# Patient Record
Sex: MALE | Race: WHITE | HISPANIC OR LATINO | Employment: UNEMPLOYED | URBAN - METROPOLITAN AREA
[De-identification: names, ages, dates, MRNs, and addresses within clinical notes are randomized per-mention and may not be internally consistent; named-entity substitution may affect disease eponyms.]

---

## 2017-01-12 ENCOUNTER — ALLSCRIPTS OFFICE VISIT (OUTPATIENT)
Dept: OTHER | Facility: OTHER | Age: 47
End: 2017-01-12

## 2017-01-26 ENCOUNTER — ALLSCRIPTS OFFICE VISIT (OUTPATIENT)
Dept: OTHER | Facility: OTHER | Age: 47
End: 2017-01-26

## 2017-05-04 ENCOUNTER — ALLSCRIPTS OFFICE VISIT (OUTPATIENT)
Dept: OTHER | Facility: OTHER | Age: 47
End: 2017-05-04

## 2017-05-04 DIAGNOSIS — E78.1 PURE HYPERGLYCERIDEMIA: ICD-10-CM

## 2017-05-04 DIAGNOSIS — R01.1 CARDIAC MURMUR: ICD-10-CM

## 2017-05-04 DIAGNOSIS — R53.83 OTHER FATIGUE: ICD-10-CM

## 2017-05-04 DIAGNOSIS — E55.9 VITAMIN D DEFICIENCY: ICD-10-CM

## 2017-05-04 DIAGNOSIS — R42 DIZZINESS AND GIDDINESS: ICD-10-CM

## 2018-01-12 VITALS
BODY MASS INDEX: 35.8 KG/M2 | HEIGHT: 68 IN | SYSTOLIC BLOOD PRESSURE: 128 MMHG | WEIGHT: 236.25 LBS | DIASTOLIC BLOOD PRESSURE: 80 MMHG | RESPIRATION RATE: 16 BRPM | HEART RATE: 75 BPM | TEMPERATURE: 97.6 F

## 2018-01-13 VITALS
HEART RATE: 88 BPM | TEMPERATURE: 97.8 F | BODY MASS INDEX: 36.26 KG/M2 | SYSTOLIC BLOOD PRESSURE: 150 MMHG | WEIGHT: 239.25 LBS | DIASTOLIC BLOOD PRESSURE: 90 MMHG | HEIGHT: 68 IN | RESPIRATION RATE: 16 BRPM

## 2018-01-14 VITALS
HEART RATE: 76 BPM | BODY MASS INDEX: 36.58 KG/M2 | SYSTOLIC BLOOD PRESSURE: 138 MMHG | DIASTOLIC BLOOD PRESSURE: 80 MMHG | TEMPERATURE: 98.5 F | WEIGHT: 241.38 LBS | RESPIRATION RATE: 16 BRPM | HEIGHT: 68 IN

## 2018-07-30 ENCOUNTER — TELEPHONE (OUTPATIENT)
Dept: FAMILY MEDICINE CLINIC | Facility: CLINIC | Age: 48
End: 2018-07-30

## 2018-07-30 NOTE — TELEPHONE ENCOUNTER
Patient was last seen 5/2017 with Dr Ashley Stewart  Please schedule Health Maintenance visit  Thank you

## 2018-10-29 ENCOUNTER — OFFICE VISIT (OUTPATIENT)
Dept: FAMILY MEDICINE CLINIC | Facility: CLINIC | Age: 48
End: 2018-10-29

## 2018-10-29 VITALS
TEMPERATURE: 97.6 F | WEIGHT: 247 LBS | RESPIRATION RATE: 16 BRPM | HEART RATE: 80 BPM | DIASTOLIC BLOOD PRESSURE: 80 MMHG | HEIGHT: 68 IN | BODY MASS INDEX: 37.44 KG/M2 | SYSTOLIC BLOOD PRESSURE: 138 MMHG

## 2018-10-29 DIAGNOSIS — J06.9 VIRAL URI WITH COUGH: Primary | ICD-10-CM

## 2018-10-29 PROCEDURE — 99213 OFFICE O/P EST LOW 20 MIN: CPT | Performed by: FAMILY MEDICINE

## 2018-10-29 RX ORDER — FLUTICASONE PROPIONATE 50 MCG
1 SPRAY, SUSPENSION (ML) NASAL DAILY
Qty: 16 G | Refills: 0 | Status: SHIPPED | OUTPATIENT
Start: 2018-10-29 | End: 2020-04-10

## 2018-10-29 NOTE — LETTER
October 29, 2018     Patient: [address]   YOB: 1970   Date of Visit: 10/29/2018       To Whom it May Concern:    Dixie aSlinas is under my professional care  He was seen in my office on 10/29/2018  He may return to work on wednesday 10/31/18   If you have any questions or concerns, please don't hesitate to call           Sincerely,          Fabio Camacho DO        CC: No Recipients

## 2018-10-29 NOTE — ASSESSMENT & PLAN NOTE
- supportive care with hydration, rest and flonase   - follow up if condition worsens or fails to improve

## 2018-10-29 NOTE — PROGRESS NOTES
Assessment/Plan     Viral URI with cough  - supportive care with hydration, rest and flonase   - follow up if condition worsens or fails to improve      Diagnoses and all orders for this visit:    Viral URI with cough  -     fluticasone (FLONASE) 50 mcg/act nasal spray; 1 spray into each nostril daily         Subjective     Chief Complaint: Cough     HPI:   This is a 47 yo M who presents with a four day history of cough, fever, congestion  He reports associated body aches and chills  He reports Tmax of 100  The following portions of the patient's history were reviewed and updated as appropriate: allergies, current medications, past family history, past medical history, past social history, past surgical history and problem list     Review of Systems  Review of Systems   Constitutional: Positive for chills, fatigue and fever  HENT: Positive for congestion and sore throat  Respiratory: Positive for cough  Negative for shortness of breath  Cardiovascular: Negative for chest pain  Gastrointestinal: Negative for abdominal pain, constipation, diarrhea, nausea and vomiting  Genitourinary: Negative for difficulty urinating  Musculoskeletal: Negative for back pain  Skin: Negative for rash  Neurological: Negative for dizziness, light-headedness and headaches  Objective   Vitals:    10/29/18 1701   BP: 138/80   Pulse: 80   Resp: 16   Temp: 97 6 °F (36 4 °C)       Physical Exam   Constitutional: He appears well-developed and well-nourished  HENT:   Mouth/Throat: Oropharynx is clear and moist    Nasal mucosa erythematous    Neck: Neck supple  Cardiovascular: Normal rate and regular rhythm  Murmur heard  2/6 systolic murmur heard best over left sternal border    Abdominal: Soft  Bowel sounds are normal  There is no tenderness  Lymphadenopathy:     He has no cervical adenopathy  Lab Results: I have reviewed all the lab results

## 2020-02-25 ENCOUNTER — TELEPHONE (OUTPATIENT)
Dept: FAMILY MEDICINE CLINIC | Facility: CLINIC | Age: 50
End: 2020-02-25

## 2020-04-03 ENCOUNTER — TELEMEDICINE (OUTPATIENT)
Dept: FAMILY MEDICINE CLINIC | Facility: CLINIC | Age: 50
End: 2020-04-03

## 2020-04-03 VITALS — WEIGHT: 235 LBS | BODY MASS INDEX: 35.61 KG/M2 | TEMPERATURE: 100 F | HEIGHT: 68 IN

## 2020-04-03 DIAGNOSIS — R50.9 FEVER, UNSPECIFIED FEVER CAUSE: Primary | ICD-10-CM

## 2020-04-03 PROCEDURE — 99442 PR PHYS/QHP TELEPHONE EVALUATION 11-20 MIN: CPT | Performed by: FAMILY MEDICINE

## 2020-04-06 ENCOUNTER — TELEMEDICINE (OUTPATIENT)
Dept: FAMILY MEDICINE CLINIC | Facility: CLINIC | Age: 50
End: 2020-04-06

## 2020-04-06 VITALS — TEMPERATURE: 102.9 F | HEIGHT: 68 IN | BODY MASS INDEX: 34.86 KG/M2 | WEIGHT: 230 LBS

## 2020-04-06 DIAGNOSIS — Z20.828 EXPOSURE TO SARS-ASSOCIATED CORONAVIRUS: ICD-10-CM

## 2020-04-06 DIAGNOSIS — Z20.828 EXPOSURE TO SARS-ASSOCIATED CORONAVIRUS: Primary | ICD-10-CM

## 2020-04-06 PROCEDURE — G0071 COMM SVCS BY RHC/FQHC 5 MIN: HCPCS | Performed by: FAMILY MEDICINE

## 2020-04-06 PROCEDURE — 87635 SARS-COV-2 COVID-19 AMP PRB: CPT

## 2020-04-07 LAB — SARS-COV-2 RNA SPEC QL NAA+PROBE: DETECTED

## 2020-04-08 ENCOUNTER — TELEMEDICINE (OUTPATIENT)
Dept: FAMILY MEDICINE CLINIC | Facility: CLINIC | Age: 50
End: 2020-04-08

## 2020-04-08 ENCOUNTER — TELEPHONE (OUTPATIENT)
Dept: FAMILY MEDICINE CLINIC | Facility: CLINIC | Age: 50
End: 2020-04-08

## 2020-04-08 VITALS — WEIGHT: 228 LBS | TEMPERATURE: 101.5 F | BODY MASS INDEX: 34.67 KG/M2

## 2020-04-08 DIAGNOSIS — Z20.828 EXPOSURE TO SARS-ASSOCIATED CORONAVIRUS: Primary | ICD-10-CM

## 2020-04-08 PROCEDURE — G0071 COMM SVCS BY RHC/FQHC 5 MIN: HCPCS | Performed by: FAMILY MEDICINE

## 2020-04-10 ENCOUNTER — TELEMEDICINE (OUTPATIENT)
Dept: FAMILY MEDICINE CLINIC | Facility: CLINIC | Age: 50
End: 2020-04-10

## 2020-04-10 ENCOUNTER — OFFICE VISIT (OUTPATIENT)
Dept: FAMILY MEDICINE CLINIC | Facility: CLINIC | Age: 50
End: 2020-04-10
Payer: MEDICARE

## 2020-04-10 VITALS — TEMPERATURE: 102.8 F | BODY MASS INDEX: 34.67 KG/M2 | WEIGHT: 228 LBS

## 2020-04-10 VITALS — TEMPERATURE: 100.1 F | OXYGEN SATURATION: 92 % | HEART RATE: 98 BPM | RESPIRATION RATE: 18 BRPM

## 2020-04-10 DIAGNOSIS — U07.1 COVID-19 VIRUS DETECTED: Primary | ICD-10-CM

## 2020-04-10 DIAGNOSIS — R06.00 DYSPNEA ON EXERTION: Primary | ICD-10-CM

## 2020-04-10 DIAGNOSIS — R05.9 COUGH: ICD-10-CM

## 2020-04-10 PROCEDURE — 99214 OFFICE O/P EST MOD 30 MIN: CPT | Performed by: NURSE PRACTITIONER

## 2020-04-10 PROCEDURE — 1036F TOBACCO NON-USER: CPT | Performed by: NURSE PRACTITIONER

## 2020-04-10 PROCEDURE — 99213 OFFICE O/P EST LOW 20 MIN: CPT | Performed by: FAMILY MEDICINE

## 2020-04-10 RX ORDER — BROMPHENIRAMINE MALEATE, PSEUDOEPHEDRINE HYDROCHLORIDE, AND DEXTROMETHORPHAN HYDROBROMIDE 2; 30; 10 MG/5ML; MG/5ML; MG/5ML
5 SYRUP ORAL EVERY 6 HOURS PRN
Qty: 118 ML | Refills: 0 | Status: SHIPPED | OUTPATIENT
Start: 2020-04-10 | End: 2020-04-23 | Stop reason: HOSPADM

## 2020-04-11 ENCOUNTER — TELEPHONE (OUTPATIENT)
Dept: OTHER | Facility: HOSPITAL | Age: 50
End: 2020-04-11

## 2020-04-12 ENCOUNTER — TELEPHONE (OUTPATIENT)
Dept: OTHER | Facility: HOSPITAL | Age: 50
End: 2020-04-12
Payer: MEDICARE

## 2020-04-12 PROCEDURE — 99442 PR PHYS/QHP TELEPHONE EVALUATION 11-20 MIN: CPT | Performed by: FAMILY MEDICINE

## 2020-04-13 ENCOUNTER — APPOINTMENT (EMERGENCY)
Dept: RADIOLOGY | Facility: HOSPITAL | Age: 50
DRG: 177 | End: 2020-04-13
Payer: MEDICARE

## 2020-04-13 ENCOUNTER — HOSPITAL ENCOUNTER (INPATIENT)
Facility: HOSPITAL | Age: 50
LOS: 10 days | Discharge: HOME | DRG: 177 | End: 2020-04-23
Attending: EMERGENCY MEDICINE | Admitting: FAMILY MEDICINE
Payer: MEDICARE

## 2020-04-13 ENCOUNTER — TELEMEDICINE (OUTPATIENT)
Dept: FAMILY MEDICINE CLINIC | Facility: CLINIC | Age: 50
End: 2020-04-13

## 2020-04-13 VITALS — WEIGHT: 228 LBS | BODY MASS INDEX: 34.67 KG/M2 | TEMPERATURE: 99.2 F

## 2020-04-13 DIAGNOSIS — R06.02 SOB (SHORTNESS OF BREATH): ICD-10-CM

## 2020-04-13 DIAGNOSIS — U07.1 COVID-19 VIRUS INFECTION: Primary | ICD-10-CM

## 2020-04-13 DIAGNOSIS — U07.1 COVID-19 VIRUS DETECTED: Primary | ICD-10-CM

## 2020-04-13 DIAGNOSIS — R79.89 ELEVATED PROCALCITONIN: ICD-10-CM

## 2020-04-13 DIAGNOSIS — R06.03 ACUTE RESPIRATORY DISTRESS: ICD-10-CM

## 2020-04-13 DIAGNOSIS — R79.82 CRP ELEVATED: ICD-10-CM

## 2020-04-13 DIAGNOSIS — R79.89 ELEVATED FERRITIN: ICD-10-CM

## 2020-04-13 PROBLEM — J96.01 ACUTE RESPIRATORY FAILURE WITH HYPOXIA (HCC): Status: ACTIVE | Noted: 2020-04-13

## 2020-04-13 LAB
ALBUMIN SERPL BCP-MCNC: 2.5 G/DL (ref 3.5–5)
ALP SERPL-CCNC: 58 U/L (ref 46–116)
ALT SERPL W P-5'-P-CCNC: 104 U/L (ref 12–78)
ANION GAP SERPL CALCULATED.3IONS-SCNC: 8 MMOL/L (ref 4–13)
APTT PPP: 32 SECONDS (ref 23–37)
AST SERPL W P-5'-P-CCNC: 72 U/L (ref 5–45)
BASE EX.OXY STD BLDV CALC-SCNC: 72.9 % (ref 60–80)
BASE EXCESS BLDV CALC-SCNC: 1.6 MMOL/L
BASOPHILS # BLD AUTO: 0.01 THOUSANDS/ΜL (ref 0–0.1)
BASOPHILS NFR BLD AUTO: 0 % (ref 0–1)
BILIRUB SERPL-MCNC: 0.47 MG/DL (ref 0.2–1)
BUN SERPL-MCNC: 12 MG/DL (ref 5–25)
CALCIUM SERPL-MCNC: 8.9 MG/DL (ref 8.3–10.1)
CHLORIDE SERPL-SCNC: 101 MMOL/L (ref 100–108)
CK MB SERPL-MCNC: 1.8 NG/ML (ref 0–5)
CK MB SERPL-MCNC: <1 % (ref 0–2.5)
CK SERPL-CCNC: 340 U/L (ref 39–308)
CO2 SERPL-SCNC: 25 MMOL/L (ref 21–32)
CREAT SERPL-MCNC: 0.7 MG/DL (ref 0.6–1.3)
CRP SERPL QL: 234 MG/L
D DIMER PPP FEU-MCNC: 1.07 UG/ML FEU
EOSINOPHIL # BLD AUTO: 0 THOUSAND/ΜL (ref 0–0.61)
EOSINOPHIL NFR BLD AUTO: 0 % (ref 0–6)
ERYTHROCYTE [DISTWIDTH] IN BLOOD BY AUTOMATED COUNT: 12.4 % (ref 11.6–15.1)
FERRITIN SERPL-MCNC: 2650 NG/ML (ref 8–388)
FIBRINOGEN PPP-MCNC: 763 MG/DL (ref 227–495)
GFR SERPL CREATININE-BSD FRML MDRD: 109 ML/MIN/1.73SQ M
GLUCOSE SERPL-MCNC: 115 MG/DL (ref 65–140)
HCO3 BLDV-SCNC: 26.2 MMOL/L (ref 24–30)
HCT VFR BLD AUTO: 44.8 % (ref 36.5–49.3)
HGB BLD-MCNC: 14.8 G/DL (ref 12–17)
IMM GRANULOCYTES # BLD AUTO: 0.06 THOUSAND/UL (ref 0–0.2)
IMM GRANULOCYTES NFR BLD AUTO: 1 % (ref 0–2)
INR PPP: 1.21 (ref 0.84–1.19)
LACTATE SERPL-SCNC: 1.6 MMOL/L (ref 0.5–2)
LDH SERPL-CCNC: 412 U/L (ref 81–234)
LYMPHOCYTES # BLD AUTO: 0.81 THOUSANDS/ΜL (ref 0.6–4.47)
LYMPHOCYTES NFR BLD AUTO: 7 % (ref 14–44)
MCH RBC QN AUTO: 28.4 PG (ref 26.8–34.3)
MCHC RBC AUTO-ENTMCNC: 33 G/DL (ref 31.4–37.4)
MCV RBC AUTO: 86 FL (ref 82–98)
MONOCYTES # BLD AUTO: 0.37 THOUSAND/ΜL (ref 0.17–1.22)
MONOCYTES NFR BLD AUTO: 3 % (ref 4–12)
NEUTROPHILS # BLD AUTO: 10.41 THOUSANDS/ΜL (ref 1.85–7.62)
NEUTS SEG NFR BLD AUTO: 89 % (ref 43–75)
NRBC BLD AUTO-RTO: 0 /100 WBCS
NT-PROBNP SERPL-MCNC: 108 PG/ML
O2 CT BLDV-SCNC: 16.2 ML/DL
PCO2 BLDV: 41.2 MM HG (ref 42–50)
PH BLDV: 7.42 [PH] (ref 7.3–7.4)
PLATELET # BLD AUTO: 333 THOUSANDS/UL (ref 149–390)
PMV BLD AUTO: 10.3 FL (ref 8.9–12.7)
PO2 BLDV: 38.6 MM HG (ref 35–45)
POTASSIUM SERPL-SCNC: 3.6 MMOL/L (ref 3.5–5.3)
PROCALCITONIN SERPL-MCNC: 0.56 NG/ML
PROT SERPL-MCNC: 7.7 G/DL (ref 6.4–8.2)
PROTHROMBIN TIME: 14.9 SECONDS (ref 11.6–14.5)
RBC # BLD AUTO: 5.22 MILLION/UL (ref 3.88–5.62)
SODIUM SERPL-SCNC: 134 MMOL/L (ref 136–145)
TROPONIN I SERPL-MCNC: <0.02 NG/ML
WBC # BLD AUTO: 11.66 THOUSAND/UL (ref 4.31–10.16)

## 2020-04-13 PROCEDURE — 83615 LACTATE (LD) (LDH) ENZYME: CPT | Performed by: STUDENT IN AN ORGANIZED HEALTH CARE EDUCATION/TRAINING PROGRAM

## 2020-04-13 PROCEDURE — 99223 1ST HOSP IP/OBS HIGH 75: CPT | Performed by: FAMILY MEDICINE

## 2020-04-13 PROCEDURE — NC001 PR NO CHARGE: Performed by: FAMILY MEDICINE

## 2020-04-13 PROCEDURE — 82728 ASSAY OF FERRITIN: CPT | Performed by: STUDENT IN AN ORGANIZED HEALTH CARE EDUCATION/TRAINING PROGRAM

## 2020-04-13 PROCEDURE — 84145 PROCALCITONIN (PCT): CPT | Performed by: STUDENT IN AN ORGANIZED HEALTH CARE EDUCATION/TRAINING PROGRAM

## 2020-04-13 PROCEDURE — 83880 ASSAY OF NATRIURETIC PEPTIDE: CPT | Performed by: STUDENT IN AN ORGANIZED HEALTH CARE EDUCATION/TRAINING PROGRAM

## 2020-04-13 PROCEDURE — 99223 1ST HOSP IP/OBS HIGH 75: CPT | Performed by: STUDENT IN AN ORGANIZED HEALTH CARE EDUCATION/TRAINING PROGRAM

## 2020-04-13 PROCEDURE — 82550 ASSAY OF CK (CPK): CPT | Performed by: STUDENT IN AN ORGANIZED HEALTH CARE EDUCATION/TRAINING PROGRAM

## 2020-04-13 PROCEDURE — 36415 COLL VENOUS BLD VENIPUNCTURE: CPT | Performed by: STUDENT IN AN ORGANIZED HEALTH CARE EDUCATION/TRAINING PROGRAM

## 2020-04-13 PROCEDURE — 94760 N-INVAS EAR/PLS OXIMETRY 1: CPT

## 2020-04-13 PROCEDURE — 99285 EMERGENCY DEPT VISIT HI MDM: CPT | Performed by: EMERGENCY MEDICINE

## 2020-04-13 PROCEDURE — 82553 CREATINE MB FRACTION: CPT | Performed by: STUDENT IN AN ORGANIZED HEALTH CARE EDUCATION/TRAINING PROGRAM

## 2020-04-13 PROCEDURE — 83605 ASSAY OF LACTIC ACID: CPT | Performed by: STUDENT IN AN ORGANIZED HEALTH CARE EDUCATION/TRAINING PROGRAM

## 2020-04-13 PROCEDURE — 85730 THROMBOPLASTIN TIME PARTIAL: CPT | Performed by: STUDENT IN AN ORGANIZED HEALTH CARE EDUCATION/TRAINING PROGRAM

## 2020-04-13 PROCEDURE — 87040 BLOOD CULTURE FOR BACTERIA: CPT | Performed by: STUDENT IN AN ORGANIZED HEALTH CARE EDUCATION/TRAINING PROGRAM

## 2020-04-13 PROCEDURE — 99285 EMERGENCY DEPT VISIT HI MDM: CPT

## 2020-04-13 PROCEDURE — 82306 VITAMIN D 25 HYDROXY: CPT | Performed by: FAMILY MEDICINE

## 2020-04-13 PROCEDURE — 84484 ASSAY OF TROPONIN QUANT: CPT | Performed by: STUDENT IN AN ORGANIZED HEALTH CARE EDUCATION/TRAINING PROGRAM

## 2020-04-13 PROCEDURE — 71045 X-RAY EXAM CHEST 1 VIEW: CPT

## 2020-04-13 PROCEDURE — 85610 PROTHROMBIN TIME: CPT | Performed by: STUDENT IN AN ORGANIZED HEALTH CARE EDUCATION/TRAINING PROGRAM

## 2020-04-13 PROCEDURE — 93005 ELECTROCARDIOGRAM TRACING: CPT

## 2020-04-13 PROCEDURE — 80053 COMPREHEN METABOLIC PANEL: CPT | Performed by: STUDENT IN AN ORGANIZED HEALTH CARE EDUCATION/TRAINING PROGRAM

## 2020-04-13 PROCEDURE — 85049 AUTOMATED PLATELET COUNT: CPT | Performed by: FAMILY MEDICINE

## 2020-04-13 PROCEDURE — 85384 FIBRINOGEN ACTIVITY: CPT | Performed by: STUDENT IN AN ORGANIZED HEALTH CARE EDUCATION/TRAINING PROGRAM

## 2020-04-13 PROCEDURE — 83520 IMMUNOASSAY QUANT NOS NONAB: CPT | Performed by: FAMILY MEDICINE

## 2020-04-13 PROCEDURE — 85025 COMPLETE CBC W/AUTO DIFF WBC: CPT | Performed by: STUDENT IN AN ORGANIZED HEALTH CARE EDUCATION/TRAINING PROGRAM

## 2020-04-13 PROCEDURE — 85379 FIBRIN DEGRADATION QUANT: CPT | Performed by: STUDENT IN AN ORGANIZED HEALTH CARE EDUCATION/TRAINING PROGRAM

## 2020-04-13 PROCEDURE — 99442 PR PHYS/QHP TELEPHONE EVALUATION 11-20 MIN: CPT | Performed by: FAMILY MEDICINE

## 2020-04-13 PROCEDURE — 82805 BLOOD GASES W/O2 SATURATION: CPT | Performed by: STUDENT IN AN ORGANIZED HEALTH CARE EDUCATION/TRAINING PROGRAM

## 2020-04-13 PROCEDURE — 86140 C-REACTIVE PROTEIN: CPT | Performed by: STUDENT IN AN ORGANIZED HEALTH CARE EDUCATION/TRAINING PROGRAM

## 2020-04-13 PROCEDURE — 96365 THER/PROPH/DIAG IV INF INIT: CPT

## 2020-04-13 RX ORDER — HYDROXYCHLOROQUINE SULFATE 200 MG/1
400 TABLET, FILM COATED ORAL 2 TIMES DAILY
Status: DISCONTINUED | OUTPATIENT
Start: 2020-04-13 | End: 2020-04-13

## 2020-04-13 RX ORDER — METHYLPREDNISOLONE SODIUM SUCCINATE 125 MG/2ML
200 INJECTION, POWDER, LYOPHILIZED, FOR SOLUTION INTRAMUSCULAR; INTRAVENOUS DAILY
Status: DISCONTINUED | OUTPATIENT
Start: 2020-04-14 | End: 2020-04-13

## 2020-04-13 RX ORDER — AZITHROMYCIN 250 MG/1
250 TABLET, FILM COATED ORAL EVERY 24 HOURS
Status: DISCONTINUED | OUTPATIENT
Start: 2020-04-14 | End: 2020-04-16

## 2020-04-13 RX ORDER — HYDROXYCHLOROQUINE SULFATE 200 MG/1
200 TABLET, FILM COATED ORAL 2 TIMES DAILY
Status: DISCONTINUED | OUTPATIENT
Start: 2020-04-14 | End: 2020-04-13

## 2020-04-13 RX ORDER — HEPARIN SODIUM 5000 [USP'U]/ML
5000 INJECTION, SOLUTION INTRAVENOUS; SUBCUTANEOUS EVERY 8 HOURS SCHEDULED
Status: DISCONTINUED | OUTPATIENT
Start: 2020-04-13 | End: 2020-04-14

## 2020-04-13 RX ORDER — METHYLPREDNISOLONE SODIUM SUCCINATE 125 MG/2ML
100 INJECTION, POWDER, LYOPHILIZED, FOR SOLUTION INTRAMUSCULAR; INTRAVENOUS DAILY
Status: DISCONTINUED | OUTPATIENT
Start: 2020-04-13 | End: 2020-04-13

## 2020-04-13 RX ORDER — HYDROXYCHLOROQUINE SULFATE 200 MG/1
400 TABLET, FILM COATED ORAL ONCE
Status: DISCONTINUED | OUTPATIENT
Start: 2020-04-14 | End: 2020-04-13

## 2020-04-13 RX ORDER — HYDROXYCHLOROQUINE SULFATE 200 MG/1
400 TABLET, FILM COATED ORAL 2 TIMES DAILY
Status: DISCONTINUED | OUTPATIENT
Start: 2020-04-14 | End: 2020-04-13

## 2020-04-13 RX ORDER — ASCORBIC ACID 500 MG
1000 TABLET ORAL 2 TIMES DAILY
Status: DISCONTINUED | OUTPATIENT
Start: 2020-04-13 | End: 2020-04-20

## 2020-04-13 RX ORDER — HYDROXYCHLOROQUINE SULFATE 200 MG/1
600 TABLET, FILM COATED ORAL ONCE
Status: COMPLETED | OUTPATIENT
Start: 2020-04-14 | End: 2020-04-14

## 2020-04-13 RX ORDER — METHYLPREDNISOLONE SODIUM SUCCINATE 125 MG/2ML
200 INJECTION, POWDER, LYOPHILIZED, FOR SOLUTION INTRAMUSCULAR; INTRAVENOUS DAILY
Status: DISCONTINUED | OUTPATIENT
Start: 2020-04-13 | End: 2020-04-13

## 2020-04-13 RX ORDER — ATORVASTATIN CALCIUM 40 MG/1
40 TABLET, FILM COATED ORAL
Status: DISCONTINUED | OUTPATIENT
Start: 2020-04-13 | End: 2020-04-20

## 2020-04-13 RX ORDER — AZITHROMYCIN 250 MG/1
500 TABLET, FILM COATED ORAL ONCE
Status: COMPLETED | OUTPATIENT
Start: 2020-04-13 | End: 2020-04-13

## 2020-04-13 RX ORDER — HYDROXYCHLOROQUINE SULFATE 200 MG/1
200 TABLET, FILM COATED ORAL EVERY 8 HOURS
Status: COMPLETED | OUTPATIENT
Start: 2020-04-14 | End: 2020-04-17

## 2020-04-13 RX ORDER — METHYLPREDNISOLONE SODIUM SUCCINATE 125 MG/2ML
200 INJECTION, POWDER, LYOPHILIZED, FOR SOLUTION INTRAMUSCULAR; INTRAVENOUS DAILY
Status: DISCONTINUED | OUTPATIENT
Start: 2020-04-13 | End: 2020-04-14

## 2020-04-13 RX ORDER — ZINC SULFATE 50(220)MG
220 CAPSULE ORAL DAILY
Status: DISCONTINUED | OUTPATIENT
Start: 2020-04-14 | End: 2020-04-20

## 2020-04-13 RX ADMIN — ATORVASTATIN CALCIUM 40 MG: 40 TABLET, FILM COATED ORAL at 22:37

## 2020-04-13 RX ADMIN — Medication 1000 MG: at 19:22

## 2020-04-13 RX ADMIN — HYDROXYCHLOROQUINE SULFATE 400 MG: 200 TABLET, FILM COATED ORAL at 18:35

## 2020-04-13 RX ADMIN — AZITHROMYCIN 500 MG: 250 TABLET, FILM COATED ORAL at 19:18

## 2020-04-13 RX ADMIN — HEPARIN SODIUM 5000 UNITS: 5000 INJECTION INTRAVENOUS; SUBCUTANEOUS at 22:39

## 2020-04-13 RX ADMIN — OXYCODONE HYDROCHLORIDE AND ACETAMINOPHEN 1000 MG: 500 TABLET ORAL at 22:37

## 2020-04-13 NOTE — ED NOTES
Portable XR at bedside      Jackson Moreno, [facility]  04/13/20 9787

## 2020-04-13 NOTE — ED PROVIDER NOTES
History  Chief Complaint   Patient presents with    Shortness of Breath     pt tested positive for covid 10 days ago; increased SOB; ambulatory pulse ox at 81% stating 83% on room air while resting      This is a 50yo male with no significant PMHx who presents to the ED this evening with exertional shortness of breath  Patient states that his shortness of breath has been getting worse over the last 3-4 days  He has been in touch with his PCP over this time and it got significantly worse today so he was sent here for further evaluation  Patient has been having viral URI symptoms for about two weeks now and was diagnosed with COVID-19 on 06Apr  Patient states that he has been walking up the stairs at his home the last few days and it takes him a really long time to catch his breath  He denies any CP  He has been having fevers still as high as 101 at home and he took tylenol twice, most recently about two hours PTA  He denies any other medications or OTC supplements  He does not smoke or use drugs  Drinks alcohol occasionally on the weekends  Prior to Admission Medications   Prescriptions Last Dose Informant Patient Reported? Taking?   brompheniramine-pseudoephedrine-DM 30-2-10 MG/5ML syrup  Self No No   Sig: Take 5 mL by mouth every 6 (six) hours as needed for cough or allergies for up to 6 days   fluticasone-salmeterol (ADVAIR, WIXELA) 250-50 mcg/dose inhaler  Self No No   Sig: Inhale 1 puff 2 (two) times a day Rinse mouth after use  Facility-Administered Medications: None       History reviewed  No pertinent past medical history  History reviewed  No pertinent surgical history  Family History   Problem Relation Age of Onset    No Known Problems Mother     No Known Problems Father      I have reviewed and agree with the history as documented      E-Cigarette/Vaping    E-Cigarette Use Never User      E-Cigarette/Vaping Substances    Nicotine No     THC No     CBD No     Flavoring No     Other No     Unknown No      Social History     Tobacco Use    Smoking status: Former Smoker     Types: Cigarettes    Smokeless tobacco: Never Used   Substance Use Topics    Alcohol use: Yes     Frequency: 2-3 times a week     Drinks per session: 5 or 6    Drug use: Never        Review of Systems   Constitutional: Negative for chills, diaphoresis and fever  HENT: Negative for congestion, rhinorrhea, sinus pressure and sore throat  Eyes: Negative for visual disturbance  Respiratory: Positive for shortness of breath  Negative for cough and chest tightness  Cardiovascular: Negative for chest pain  Gastrointestinal: Negative for abdominal pain, constipation, diarrhea, nausea and vomiting  Genitourinary: Negative for dysuria, frequency, hematuria and urgency  Musculoskeletal: Negative for arthralgias and myalgias  Skin: Negative for color change and rash  Neurological: Negative for dizziness, numbness and headaches  Physical Exam  ED Triage Vitals   Temperature Pulse Respirations Blood Pressure SpO2   04/13/20 1834 04/13/20 1706 04/13/20 1706 04/13/20 1706 04/13/20 1706   99 4 °F (37 4 °C) 90 (!) 26 139/73 (!) 81 %      Temp Source Heart Rate Source Patient Position - Orthostatic VS BP Location FiO2 (%)   04/13/20 1834 04/13/20 1706 04/13/20 1706 04/13/20 1706 --   Oral Monitor Lying Right arm       Pain Score       04/13/20 2033       No Pain             Orthostatic Vital Signs  Vitals:    04/13/20 1840 04/13/20 1900 04/13/20 1930 04/13/20 2000   BP: 129/68 120/57 121/73 127/79   Pulse: 94 88 86 92   Patient Position - Orthostatic VS:  Lying Lying Lying       Physical Exam   Constitutional: He is oriented to person, place, and time  He appears well-developed and well-nourished  No distress  Patient's ambulatory pulse ox on arrival was 80% with a good pleth  He was placed on NC O2 at 2LPM and his sats improved to high 90s   HENT:   Head: Normocephalic and atraumatic     Eyes: Pupils are equal, round, and reactive to light  Conjunctivae are normal    Neck: Normal range of motion  Neck supple  No JVD present  Cardiovascular: Normal rate, regular rhythm and normal heart sounds  Exam reveals no gallop and no friction rub  No murmur heard  Pulmonary/Chest: Effort normal and breath sounds normal  No stridor  No respiratory distress  He has no wheezes  He has no rales  Abdominal: Soft  Bowel sounds are normal  He exhibits no distension  There is no tenderness  There is no guarding  Musculoskeletal: Normal range of motion  He exhibits no edema, tenderness or deformity  Neurological: He is alert and oriented to person, place, and time  No cranial nerve deficit or sensory deficit  He exhibits normal muscle tone  Skin: Skin is warm and dry  No rash noted  He is not diaphoretic  No erythema  No pallor  Psychiatric: He has a normal mood and affect  His behavior is normal    Nursing note and vitals reviewed        ED Medications  Medications   hydroxychloroquine (PLAQUENIL) tablet 400 mg (400 mg Oral Given 4/13/20 1835)   ascorbic acid (VITAMIN C) tablet 1,000 mg (has no administration in time range)   zinc sulfate (ZINCATE) capsule 220 mg (has no administration in time range)   atorvastatin (LIPITOR) tablet 40 mg (has no administration in time range)   methylPREDNISolone sodium succinate (Solu-MEDROL) injection 200 mg (has no administration in time range)   ceftriaxone (ROCEPHIN) 1 g/50 mL in dextrose IVPB (0 mg Intravenous Stopped 4/13/20 2032)   azithromycin (ZITHROMAX) tablet 500 mg (500 mg Oral Given 4/13/20 1918)       Diagnostic Studies  Results Reviewed     Procedure Component Value Units Date/Time    Interleukin-6,Plasma [[id number]]     Lab Status:  No result Specimen:  Blood     CKMB [[id number]]  (Normal) Collected:  04/13/20 1746    Lab Status:  Final result Specimen:  Blood from Arm, Left Updated:  04/13/20 2110     CK-MB Index <1 0 %      CK-MB 1 8 ng/mL     Vitamin D 25 hydroxy [[id number]]     Lab Status:  No result Specimen:  Blood     NT-BNP PRO [[id number]]  (Normal) Collected:  04/13/20 1746    Lab Status:  Final result Specimen:  Blood from Arm, Left Updated:  04/13/20 2055     NT-proBNP 108 pg/mL     LD,Blood [[id number]]  (Abnormal) Collected:  04/13/20 1746    Lab Status:  Final result Specimen:  Blood from Arm, Left Updated:  04/13/20 2054      U/L     CK (with reflex to MB) [[id number]]  (Abnormal) Collected:  04/13/20 1746    Lab Status:  Final result Specimen:  Blood from Arm, Left Updated:  04/13/20 2054     Total  U/L     Blood gas, venous [[id number]]  (Abnormal) Collected:  04/13/20 1858    Lab Status:  Final result Specimen:  Blood from Arm, Right Updated:  04/13/20 1907     pH, [place] 7 422     pCO2, [place] 41 2 mm Hg      pO2, [place] 38 6 mm Hg      HCO3, [place] 26 2 mmol/L      Base Excess, [place] 1 6 mmol/L      O2 Content, [place] 16 2 ml/dL      O2 HGB, VENOUS 72 9 %     C-reactive protein [[id number]]  (Abnormal) Collected:  04/13/20 1746    Lab Status:  Final result Specimen:  Blood from Arm, Left Updated:  04/13/20 1851      0 mg/L     Ferritin [[id number]]  (Abnormal) Collected:  04/13/20 1746    Lab Status:  Final result Specimen:  Blood from Arm, Left Updated:  04/13/20 1851     Ferritin 2,650 ng/mL     Fibrinogen [[id number]]     Lab Status:  No result Specimen:  Blood     Procalcitonin [[id number]]  (Abnormal) Collected:  04/13/20 1746    Lab Status:  Final result Specimen:  Blood from Arm, Left Updated:  04/13/20 1834     Procalcitonin 0 56 ng/ml     Troponin I [[id number]]  (Normal) Collected:  04/13/20 1746    Lab Status:  Final result Specimen:  Blood from Arm, Left Updated:  04/13/20 1822     Troponin I <0 02 ng/mL     Lactic acid x2 [[id number]]  (Normal) Collected:  04/13/20 1746    Lab Status:  Final result Specimen:  Blood from Arm, Left Updated:  04/13/20 1820     LACTIC ACID 1 6 mmol/L     Narrative:       Result may be elevated if tourniquet was used during collection      Comprehensive metabolic panel [[id number]]  (Abnormal) Collected:  04/13/20 1746    Lab Status:  Final result Specimen:  Blood from Arm, Left Updated:  04/13/20 1821     Sodium 134 mmol/L      Potassium 3 6 mmol/L      Chloride 101 mmol/L      CO2 25 mmol/L      ANION GAP 8 mmol/L      BUN 12 mg/dL      Creatinine 0 70 mg/dL      Glucose 115 mg/dL      Calcium 8 9 mg/dL      AST 72 U/L       U/L      Alkaline Phosphatase 58 U/L      Total Protein 7 7 g/dL      Albumin 2 5 g/dL      Total Bilirubin 0 47 mg/dL      eGFR 109 ml/min/1 73sq m     Narrative:       [place] guidelines for Chronic Kidney Disease (CKD):     Stage 1 with normal or high GFR (GFR > 90 mL/min/1 73 square meters)    Stage 2 Mild CKD (GFR = 60-89 mL/min/1 73 square meters)    Stage 3A Moderate CKD (GFR = 45-59 mL/min/1 73 square meters)    Stage 3B Moderate CKD (GFR = 30-44 mL/min/1 73 square meters)    Stage 4 Severe CKD (GFR = 15-29 mL/min/1 73 square meters)    Stage 5 End Stage CKD (GFR <15 mL/min/1 73 square meters)  Note: GFR calculation is accurate only with a steady state creatinine    D-dimer, quantitative [[id number]]  (Abnormal) Collected:  04/13/20 1747    Lab Status:  Final result Specimen:  Blood from Arm, Left Updated:  04/13/20 1820     D-Dimer, Quant 1 07 ug/ml FEU     Protime-INR [[id number]]  (Abnormal) Collected:  04/13/20 1747    Lab Status:  Final result Specimen:  Blood from Arm, Left Updated:  04/13/20 1818     Protime 14 9 seconds      INR 1 21    APTT [[id number]]  (Normal) Collected:  04/13/20 1747    Lab Status:  Final result Specimen:  Blood from Arm, Left Updated:  04/13/20 1818     PTT 32 seconds     CBC and differential [[id number]]  (Abnormal) Collected:  04/13/20 1746    Lab Status:  Final result Specimen:  Blood from Arm, Left Updated:  04/13/20 1802     WBC 11 66 Thousand/uL      RBC 5 22 Million/uL      Hemoglobin 14 8 g/dL      Hematocrit 44 8 %      MCV 86 fL MCH 28 4 pg      MCHC 33 0 g/dL      RDW 12 4 %      MPV 10 3 fL      Platelets 397 Thousands/uL      nRBC 0 /100 WBCs      Neutrophils Relative 89 %      Immat GRANS % 1 %      Lymphocytes Relative 7 %      Monocytes Relative 3 %      Eosinophils Relative 0 %      Basophils Relative 0 %      Neutrophils Absolute 10 41 Thousands/µL      Immature Grans Absolute 0 06 Thousand/uL      Lymphocytes Absolute 0 81 Thousands/µL      Monocytes Absolute 0 37 Thousand/µL      Eosinophils Absolute 0 00 Thousand/µL      Basophils Absolute 0 01 Thousands/µL     Blood culture #1 [[id number]] Collected:  04/13/20 1733    Lab Status: In process Specimen:  Blood from Hand, Left Updated:  04/13/20 1759    Blood culture #2 [[id number]] Collected:  04/13/20 1746    Lab Status: In process Specimen:  Blood from Arm, Left Updated:  04/13/20 1759    Urine Macroscopic, POC [[id number]] Collected:  04/13/20 1744    Lab Status:  Edited Result - FINAL Specimen:  Urine Updated:  04/13/20 1753     Color, UA --     Clarity, UA --     pH, UA --     Leukocytes, UA --     Nitrite, UA --     Protein, UA --     Glucose, UA --     Ketones, UA --     Urobilinogen, UA --     Bilirubin, UA --     Blood, UA --     Specific [place], UA --    Narrative:       CLINITEK RESULT                 XR chest 1 view portable   Final Result by Janki Harry DO (04/13 2144)      Patchy bilateral airspace disease could be indicative of multifocal pneumonia including viral etiologies              Workstation performed: [id number]               Procedures  ECG 12 Lead Documentation Only  Date/Time: 4/13/2020 5:50 PM  Performed by: Sahara Victor MD  Authorized by: Sahara Victor MD     Indications / Diagnosis:  SOB  ECG reviewed by me, the ED Provider: yes    Patient location:  ED  Previous ECG:     Previous ECG:  Unavailable  Interpretation:     Interpretation: normal    Rate:     ECG rate assessment: normal    Rhythm:     Rhythm: sinus rhythm    Ectopy: Ectopy: none    QRS:     QRS axis:  Normal    QRS intervals:  Normal  Conduction:     Conduction: normal    ST segments:     ST segments:  Normal  T waves:     T waves: normal    Comments:      QTc is 432ms          ED Course  ED Course as of Apr 13 2155 Mon Apr 13, 2020   1720 D-dimer drawn for COVID risk stratification purposes rather than r/o DVT/PE                                         [facility]  Number of Diagnoses or Management Options  Acute respiratory distress:   COVID-19 virus infection:   CRP elevated:   Elevated ferritin:   Elevated procalcitonin:   Diagnosis management comments: Patient's O2 saturation dropped requiring 5 L of nasal cannula  At that time the decision was made to move the patient to 8 liters/minute of midflow and the patient was breathing comfortably on this setting  Called and spoke with Medical critical care who agreed that the patient could go to step-down level one but should be started on methylprednisolone  Called and spoke with Family Medicine who came and evaluated the patient at the bedside and accepted him to their service as a step-down level one while on 8 liters/minutes of mid flow          Disposition  Final diagnoses:   COVID-19 virus infection   Acute respiratory distress   CRP elevated   Elevated ferritin   Elevated procalcitonin     Time reflects when diagnosis was documented in both MDM as applicable and the Disposition within this note     Time User Action Codes Description Comment    4/13/2020  8:39 PM Lottie Bring Add [U07 1] COVID-19 virus infection     4/13/2020  8:40 PM Lottie Bring Add [R06 03] Acute respiratory distress     4/13/2020  8:40 PM Dickie Faster [R79 82] CRP elevated     4/13/2020  8:40 PM Dickie Faster [R79 89] Elevated ferritin     4/13/2020  8:41 PM Lottie Bring Add [E22 9] Elevated prolactin level ([address] )     4/13/2020  8:41 PM Lottie Bring Remove [E22 9] Elevated prolactin level ([address] )     4/13/2020  8:41 PM Maureen Gusman Add [Q23 26] Elevated procalcitonin       ED Disposition     ED Disposition Condition Date/Time Comment    Admit Stable Mon Apr 13, 2020  8:39 PM Case was discussed with FM and the patient's admission status was agreed to be Admission Status: inpatient status to the service of Dr Feliciano Marin   Follow-up Information     Follow up With Specialties Details Why SHARON Melvin [address], [employer]   [address]  [phone]            Current Discharge Medication List      CONTINUE these medications which have NOT CHANGED    Details   brompheniramine-pseudoephedrine-DM 30-2-10 MG/5ML syrup Take 5 mL by mouth every 6 (six) hours as needed for cough or allergies for up to 6 days  Qty: 118 mL, Refills: 0    Associated Diagnoses: Cough      fluticasone-salmeterol (ADVAIR, WIXELA) 250-50 mcg/dose inhaler Inhale 1 puff 2 (two) times a day Rinse mouth after use  Qty: 1 Inhaler, Refills: 5    Comments: Substitution to a formulary equivalent within the same pharmaceutical class is authorized  Associated Diagnoses: Dyspnea on exertion           No discharge procedures on file  PDMP Review     None           ED Provider  Attending physically available and evaluated [address]  I managed the patient along with the ED Attending      Electronically Signed by         Chandni Soto MD  04/13/20 2457

## 2020-04-14 PROBLEM — R19.7 DIARRHEA: Status: ACTIVE | Noted: 2020-04-14

## 2020-04-14 PROBLEM — E55.9 VITAMIN D DEFICIENCY: Status: ACTIVE | Noted: 2020-04-14

## 2020-04-14 LAB
25(OH)D3 SERPL-MCNC: 10.3 NG/ML (ref 30–100)
ALBUMIN SERPL BCP-MCNC: 2.1 G/DL (ref 3.5–5)
ALP SERPL-CCNC: 53 U/L (ref 46–116)
ALT SERPL W P-5'-P-CCNC: 117 U/L (ref 12–78)
ANION GAP SERPL CALCULATED.3IONS-SCNC: 6 MMOL/L (ref 4–13)
AST SERPL W P-5'-P-CCNC: 107 U/L (ref 5–45)
ATRIAL RATE: 74 BPM
ATRIAL RATE: 93 BPM
BASOPHILS # BLD MANUAL: 0 THOUSAND/UL (ref 0–0.1)
BASOPHILS NFR MAR MANUAL: 0 % (ref 0–1)
BILIRUB SERPL-MCNC: 0.32 MG/DL (ref 0.2–1)
BUN SERPL-MCNC: 14 MG/DL (ref 5–25)
CA-I BLD-SCNC: 1.11 MMOL/L (ref 1.12–1.32)
CALCIUM SERPL-MCNC: 8.7 MG/DL (ref 8.3–10.1)
CHLORIDE SERPL-SCNC: 107 MMOL/L (ref 100–108)
CK MB SERPL-MCNC: 1.5 NG/ML (ref 0–5)
CK MB SERPL-MCNC: <1 % (ref 0–2.5)
CK SERPL-CCNC: 268 U/L (ref 39–308)
CO2 SERPL-SCNC: 25 MMOL/L (ref 21–32)
CREAT SERPL-MCNC: 0.63 MG/DL (ref 0.6–1.3)
CRP SERPL QL: 189 MG/L
CRP SERPL QL: 196 MG/L
D DIMER PPP FEU-MCNC: 1.29 UG/ML FEU
EOSINOPHIL # BLD MANUAL: 0 THOUSAND/UL (ref 0–0.4)
EOSINOPHIL NFR BLD MANUAL: 0 % (ref 0–6)
ERYTHROCYTE [DISTWIDTH] IN BLOOD BY AUTOMATED COUNT: 12.4 % (ref 11.6–15.1)
FERRITIN SERPL-MCNC: 2942 NG/ML (ref 8–388)
FIBRINOGEN PPP-MCNC: 1055 MG/DL (ref 227–495)
GFR SERPL CREATININE-BSD FRML MDRD: 114 ML/MIN/1.73SQ M
GLUCOSE SERPL-MCNC: 130 MG/DL (ref 65–140)
HCT VFR BLD AUTO: 43.6 % (ref 36.5–49.3)
HGB BLD-MCNC: 14.1 G/DL (ref 12–17)
INR PPP: 1.21 (ref 0.84–1.19)
LDH SERPL-CCNC: 423 U/L (ref 81–234)
LYMPHOCYTES # BLD AUTO: 0 % (ref 14–44)
LYMPHOCYTES # BLD AUTO: 0 THOUSAND/UL (ref 0.6–4.47)
MAGNESIUM SERPL-MCNC: 2.7 MG/DL (ref 1.6–2.6)
MCH RBC QN AUTO: 28.1 PG (ref 26.8–34.3)
MCHC RBC AUTO-ENTMCNC: 32.3 G/DL (ref 31.4–37.4)
MCV RBC AUTO: 87 FL (ref 82–98)
MONOCYTES # BLD AUTO: 0 THOUSAND/UL (ref 0–1.22)
MONOCYTES NFR BLD: 0 % (ref 4–12)
NEUTROPHILS # BLD MANUAL: 8.7 THOUSAND/UL (ref 1.85–7.62)
NEUTS SEG NFR BLD AUTO: 99 % (ref 43–75)
NRBC BLD AUTO-RTO: 0 /100 WBCS
NT-PROBNP SERPL-MCNC: 61 PG/ML
P AXIS: 47 DEGREES
P AXIS: 58 DEGREES
PLATELET # BLD AUTO: 349 THOUSANDS/UL (ref 149–390)
PLATELET # BLD AUTO: 366 THOUSANDS/UL (ref 149–390)
PLATELET BLD QL SMEAR: ADEQUATE
PMV BLD AUTO: 10.3 FL (ref 8.9–12.7)
PMV BLD AUTO: 10.8 FL (ref 8.9–12.7)
POTASSIUM SERPL-SCNC: 4 MMOL/L (ref 3.5–5.3)
PR INTERVAL: 176 MS
PR INTERVAL: 176 MS
PROCALCITONIN SERPL-MCNC: 0.4 NG/ML
PROT SERPL-MCNC: 7.1 G/DL (ref 6.4–8.2)
PROTHROMBIN TIME: 14.9 SECONDS (ref 11.6–14.5)
QRS AXIS: 2 DEGREES
QRS AXIS: 27 DEGREES
QRSD INTERVAL: 106 MS
QRSD INTERVAL: 88 MS
QT INTERVAL: 348 MS
QT INTERVAL: 404 MS
QTC INTERVAL: 432 MS
QTC INTERVAL: 448 MS
RBC # BLD AUTO: 5.01 MILLION/UL (ref 3.88–5.62)
SARS-COV-2 RNA RESP QL NAA+PROBE: POSITIVE
SODIUM SERPL-SCNC: 138 MMOL/L (ref 136–145)
T WAVE AXIS: 14 DEGREES
T WAVE AXIS: 26 DEGREES
TRIGL SERPL-MCNC: 142 MG/DL
TROPONIN I SERPL-MCNC: <0.02 NG/ML
VARIANT LYMPHS # BLD AUTO: 1 %
VENTRICULAR RATE: 74 BPM
VENTRICULAR RATE: 93 BPM
WBC # BLD AUTO: 8.79 THOUSAND/UL (ref 4.31–10.16)

## 2020-04-14 PROCEDURE — 99233 SBSQ HOSP IP/OBS HIGH 50: CPT | Performed by: INTERNAL MEDICINE

## 2020-04-14 PROCEDURE — 82728 ASSAY OF FERRITIN: CPT | Performed by: FAMILY MEDICINE

## 2020-04-14 PROCEDURE — 99222 1ST HOSP IP/OBS MODERATE 55: CPT | Performed by: INTERNAL MEDICINE

## 2020-04-14 PROCEDURE — 84484 ASSAY OF TROPONIN QUANT: CPT | Performed by: FAMILY MEDICINE

## 2020-04-14 PROCEDURE — 85610 PROTHROMBIN TIME: CPT | Performed by: FAMILY MEDICINE

## 2020-04-14 PROCEDURE — 83880 ASSAY OF NATRIURETIC PEPTIDE: CPT | Performed by: FAMILY MEDICINE

## 2020-04-14 PROCEDURE — 93005 ELECTROCARDIOGRAM TRACING: CPT

## 2020-04-14 PROCEDURE — 86140 C-REACTIVE PROTEIN: CPT | Performed by: INTERNAL MEDICINE

## 2020-04-14 PROCEDURE — 82550 ASSAY OF CK (CPK): CPT | Performed by: FAMILY MEDICINE

## 2020-04-14 PROCEDURE — 80053 COMPREHEN METABOLIC PANEL: CPT | Performed by: FAMILY MEDICINE

## 2020-04-14 PROCEDURE — 87635 SARS-COV-2 COVID-19 AMP PRB: CPT | Performed by: FAMILY MEDICINE

## 2020-04-14 PROCEDURE — 82330 ASSAY OF CALCIUM: CPT | Performed by: FAMILY MEDICINE

## 2020-04-14 PROCEDURE — 84478 ASSAY OF TRIGLYCERIDES: CPT | Performed by: FAMILY MEDICINE

## 2020-04-14 PROCEDURE — 82553 CREATINE MB FRACTION: CPT | Performed by: FAMILY MEDICINE

## 2020-04-14 PROCEDURE — 85027 COMPLETE CBC AUTOMATED: CPT | Performed by: FAMILY MEDICINE

## 2020-04-14 PROCEDURE — 94760 N-INVAS EAR/PLS OXIMETRY 1: CPT

## 2020-04-14 PROCEDURE — 86140 C-REACTIVE PROTEIN: CPT | Performed by: FAMILY MEDICINE

## 2020-04-14 PROCEDURE — 83615 LACTATE (LD) (LDH) ENZYME: CPT | Performed by: FAMILY MEDICINE

## 2020-04-14 PROCEDURE — 93010 ELECTROCARDIOGRAM REPORT: CPT | Performed by: INTERNAL MEDICINE

## 2020-04-14 PROCEDURE — 85007 BL SMEAR W/DIFF WBC COUNT: CPT | Performed by: FAMILY MEDICINE

## 2020-04-14 PROCEDURE — 87493 C DIFF AMPLIFIED PROBE: CPT | Performed by: INTERNAL MEDICINE

## 2020-04-14 PROCEDURE — 85384 FIBRINOGEN ACTIVITY: CPT | Performed by: FAMILY MEDICINE

## 2020-04-14 PROCEDURE — 82955 ASSAY OF G6PD ENZYME: CPT | Performed by: FAMILY MEDICINE

## 2020-04-14 PROCEDURE — 84145 PROCALCITONIN (PCT): CPT | Performed by: FAMILY MEDICINE

## 2020-04-14 PROCEDURE — 99223 1ST HOSP IP/OBS HIGH 75: CPT | Performed by: INTERNAL MEDICINE

## 2020-04-14 PROCEDURE — 83735 ASSAY OF MAGNESIUM: CPT | Performed by: FAMILY MEDICINE

## 2020-04-14 PROCEDURE — 85379 FIBRIN DEGRADATION QUANT: CPT | Performed by: FAMILY MEDICINE

## 2020-04-14 RX ORDER — HYDROXYCHLOROQUINE SULFATE 200 MG/1
200 TABLET, FILM COATED ORAL ONCE
Status: COMPLETED | OUTPATIENT
Start: 2020-04-14 | End: 2020-04-14

## 2020-04-14 RX ORDER — SACCHAROMYCES BOULARDII 250 MG
250 CAPSULE ORAL 2 TIMES DAILY
Status: DISCONTINUED | OUTPATIENT
Start: 2020-04-14 | End: 2020-04-23 | Stop reason: HOSPADM

## 2020-04-14 RX ORDER — METHYLPREDNISOLONE SODIUM SUCCINATE 125 MG/2ML
50 INJECTION, POWDER, LYOPHILIZED, FOR SOLUTION INTRAMUSCULAR; INTRAVENOUS EVERY 12 HOURS SCHEDULED
Status: DISCONTINUED | OUTPATIENT
Start: 2020-04-14 | End: 2020-04-16

## 2020-04-14 RX ORDER — MELATONIN
2000 DAILY
Status: DISCONTINUED | OUTPATIENT
Start: 2020-04-14 | End: 2020-04-20

## 2020-04-14 RX ADMIN — ENOXAPARIN SODIUM 40 MG: 40 INJECTION SUBCUTANEOUS at 12:02

## 2020-04-14 RX ADMIN — AZITHROMYCIN 250 MG: 250 TABLET, FILM COATED ORAL at 08:54

## 2020-04-14 RX ADMIN — HYDROXYCHLOROQUINE SULFATE 200 MG: 200 TABLET, FILM COATED ORAL at 14:52

## 2020-04-14 RX ADMIN — METHYLPREDNISOLONE SODIUM SUCCINATE 200 MG: 125 INJECTION, POWDER, FOR SOLUTION INTRAMUSCULAR; INTRAVENOUS at 00:15

## 2020-04-14 RX ADMIN — ZINC SULFATE 220 MG (50 MG) CAPSULE 220 MG: CAPSULE at 08:54

## 2020-04-14 RX ADMIN — METHYLPREDNISOLONE SODIUM SUCCINATE 200 MG: 125 INJECTION, POWDER, FOR SOLUTION INTRAMUSCULAR; INTRAVENOUS at 08:53

## 2020-04-14 RX ADMIN — SODIUM CHLORIDE 1000 ML: 0.9 INJECTION, SOLUTION INTRAVENOUS at 01:19

## 2020-04-14 RX ADMIN — Medication 250 MG: at 12:02

## 2020-04-14 RX ADMIN — METHYLPREDNISOLONE SODIUM SUCCINATE 50 MG: 125 INJECTION, POWDER, FOR SOLUTION INTRAMUSCULAR; INTRAVENOUS at 21:58

## 2020-04-14 RX ADMIN — OXYCODONE HYDROCHLORIDE AND ACETAMINOPHEN 1000 MG: 500 TABLET ORAL at 08:46

## 2020-04-14 RX ADMIN — Medication 250 MG: at 17:34

## 2020-04-14 RX ADMIN — HYDROXYCHLOROQUINE SULFATE 200 MG: 200 TABLET, FILM COATED ORAL at 00:16

## 2020-04-14 RX ADMIN — MELATONIN 2000 UNITS: at 12:02

## 2020-04-14 RX ADMIN — OXYCODONE HYDROCHLORIDE AND ACETAMINOPHEN 1000 MG: 500 TABLET ORAL at 17:34

## 2020-04-14 RX ADMIN — HYDROXYCHLOROQUINE SULFATE 200 MG: 200 TABLET, FILM COATED ORAL at 22:00

## 2020-04-14 RX ADMIN — ATORVASTATIN CALCIUM 40 MG: 40 TABLET, FILM COATED ORAL at 21:59

## 2020-04-14 RX ADMIN — HYDROXYCHLOROQUINE SULFATE 600 MG: 200 TABLET, FILM COATED ORAL at 08:53

## 2020-04-14 RX ADMIN — CEFTRIAXONE SODIUM 1000 MG: 1 INJECTION, POWDER, FOR SOLUTION INTRAMUSCULAR; INTRAVENOUS at 18:28

## 2020-04-14 RX ADMIN — HEPARIN SODIUM 5000 UNITS: 5000 INJECTION INTRAVENOUS; SUBCUTANEOUS at 04:51

## 2020-04-14 NOTE — SOCIAL WORK
TCT patient in room, explained CM program/CM role  Resides with wife and child in a house, [address], bedroom main floor  Independent prior to admission for ADL's/ambulation  He drives  PCP Dr nAny Carl, aware will receive call within 3 business days from PCP office to sched appt  Has prescription plan, uses [facility]  Denies utilization DME/HHC/IP Rehab  Denies mental health illness, IP or OP psyche care, drug and/or alcohol abuse  Primary contact is wife Rubén Goodman, [phone]  No POA/Living Will  Wife will provide transport home    CM reviewed d/c planning process including the following: identifying help at home, patient preference for d/c planning needs, Discharge Lounge, [facility] Meds to Bed program, availability of treatment team to discuss questions or concerns patient and/or family may have regarding understanding medications and recognizing signs and symptoms once discharged  CM also encouraged patient to follow up with all recommended appointments after discharge  Patient advised of importance for patient and family to participate in managing patients medical well being  Reviewed with Patient discharge checklist   Patient/caregiver encouraged to participate in discharge plan of care prior to discharge home

## 2020-04-14 NOTE — ASSESSMENT & PLAN NOTE
Positive COVID-19 PCR  Severe disease due to viral pneumonia and and high oxygen requirement  With acute respiratory failure  Change to high flow oxygen  Continue prone positioning  Continue ceftriaxone, azithromycin, corticosteroids, hydroxychloroquine, vitamin C, Zinc, atorvastatin, vitamin D    Wean oxygen as tolerated  Monitor Procalcitonin, ferritin, CRP, CBC, temps

## 2020-04-14 NOTE — CONSULTS
ROSE/ Bernabe 64 1970, 44 y o  male MRN: [id number]    Unit/Bed#: CW2 214-01 Encounter: [id number]    Primary Care Provider: Brittany Bello MD   Date and time admitted to hospital: 4/13/2020  4:58 PM      Inpatient consult to Jere Gaspar performed by: Jeffrey Arias PA-C  Consult ordered by: Nicolas Alvarado MD          Acute respiratory failure with hypoxia [facility])  Assessment & Plan  - requiring midflow 15L, 2/2 above  -Continue IS, educated on self proning   -limit activity, encourage bedside commode/bedpan use    * COVID-19 virus infection  Assessment & Plan  -Positive on 4/6  -Continue therapy with lipitor, zinc, vit C  Azith/plaq/ceftriaxone day 1  -Agree with solumedrol  -Checking IL6 and vit D now   -recommend pulmonary consultation in AM  -trend inflammatory markers daily        Physician Requesting Consult: Jahaira Andersen    Reason for Consultation / Chief Complaint: Acute hypoxic respiratory failure 2/2 COVID-19 viral pneumonia     History of Present Illness:  Yoshi Branch is a 44 y o  male w/ no significant PMHx who now presents with acute hypoxic respiratory failure 2/2 COVID-19 viral pneumonia  The patient has had fever, malaise, and SOB since 4/2  Tested positive for COVID on 4/6  Presented to the ED earlier this evening with complaints of worsened SOB and was found to be hypoxic requiring 10L midlfow  He was started on treatment for COVID and admitted SD1  Over the last several hours the patient has had increased O2 requirement up to 15L with exertion  He is currently on 11L midflow and states that his overall WOB feels improved  Critical care has been consulted for airway/O2 monitoring  History obtained from chart review and the patient  Past Medical History:  History reviewed  No pertinent past medical history  Past Surgical History:  History reviewed  No pertinent surgical history      Past Family History:  Family History   Problem Relation Age of Onset    No Known Problems Mother     No Known Problems Father        Social History:  Social History     Tobacco Use   Smoking Status Former Smoker    Types: Cigarettes   Smokeless Tobacco Never Used     Social History     Substance and Sexual Activity   Alcohol Use Yes    Frequency: 2-3 times a week    Drinks per session: 5 or 6     Social History     Substance and Sexual Activity   Drug Use Never     Marital Status: Single    Home Medications:   Prior to Admission medications    Medication Sig Start Date End Date Taking? Authorizing Provider   brompheniramine-pseudoephedrine-DM 30-2-10 MG/5ML syrup Take 5 mL by mouth every 6 (six) hours as needed for cough or allergies for up to 6 days 4/10/20 4/16/20 Yes ASTON Hernandez Ra   fluticasone-salmeterol (ADVAIR, [place] 250-50 mcg/dose inhaler Inhale 1 puff 2 (two) times a day Rinse mouth after use  4/10/20  Yes ASTON Hernandez Ra       Inpatient Medications:  Scheduled Meds:  Current Facility-Administered Medications:  ascorbic acid 1,000 mg Oral BID Hermila Bonilla MD   atorvastatin 40 mg Oral HS Hermila Bonilla MD   [START ON 4/14/2020] azithromycin 250 mg Oral Q24H Hermila Bonilla MD   [START ON 4/14/2020] cefTRIAXone 1,000 mg Intravenous Q24H Hermila Bonilla MD   heparin (porcine) 5,000 Units Subcutaneous Q8H Jesika Rodriguez MD   [START ON 4/14/2020] hydroxychloroquine 200 mg Oral BID MD Becky Valdes ON 4/14/2020] hydroxychloroquine 400 mg Oral Once Hermila Bonilla MD   methylPREDNISolone sodium succinate 200 mg Intravenous Daily MD Becky Valdes ON 4/14/2020] zinc sulfate 220 mg Oral Daily Hermila Bonilla MD     Continuous Infusions:   PRN Meds:       Allergies:  No Known Allergies    ROS:   Review of Systems   Constitutional: Positive for fatigue and fever  Respiratory: Positive for cough and shortness of breath  Negative for wheezing  Cardiovascular: Negative  Gastrointestinal: Negative  Vitals:  Vitals:    20 2100 20 2156 20 2223   BP: 127/79 130/77 140/83    BP Location: Right arm      Pulse: 92 96 98    Resp: 20 (!) 32 20    Temp:       TempSrc:  Oral     SpO2: 95% 95% 94%    Weight:  103 kg (228 lb)  104 kg (230 lb 4 8 oz)   Height:  5' 8" (1 727 m)       Temperature:   Temp (24hrs), Av 3 °F (37 4 °C), Min:99 2 °F (37 3 °C), Max:99 4 °F (37 4 °C)    Current Temperature: 99 4 °F (37 4 °C)    Weights:   IBW: 68 4 kg  Body mass index is 35 02 kg/m²  Hemodynamic Monitoring:  N/A     Non-Invasive/Invasive Ventilation Settings:  Respiratory    Lab Data (Last 4 hours)    None         O2/Vent Data (Last 4 hours)    None              No results found for: PHART, PHF1FCN, PO2ART, SPT9CRS, E9MIFZTP, BEART, SOURCE  SpO2: SpO2: 94 %     Physical Exam:  Physical Exam   Constitutional: He is oriented to person, place, and time  Obese     HENT:   Head: Normocephalic and atraumatic  Eyes: Pupils are equal, round, and reactive to light  EOM are normal    Neck: Normal range of motion  Neck supple  No JVD present  No tracheal deviation present  Cardiovascular: Normal rate, regular rhythm and normal heart sounds  No murmur heard  Pulmonary/Chest:   Decreased coarse breath sounds B/L   Abdominal: Soft  Bowel sounds are normal  He exhibits distension  There is no tenderness  Musculoskeletal: Normal range of motion  He exhibits no edema  Neurological: He is alert and oriented to person, place, and time           Labs:  Results from last 7 days   Lab Units 20  1746   WBC Thousand/uL 11 66*   HEMOGLOBIN g/dL 14 8   HEMATOCRIT % 44 8   PLATELETS Thousands/uL 333   NEUTROS PCT % 89*   MONOS PCT % 3*    Results from last 7 days   Lab Units 20  1746   POTASSIUM mmol/L 3 6   CHLORIDE mmol/L 101   CO2 mmol/L 25   BUN mg/dL 12   CREATININE mg/dL 0 70   CALCIUM mg/dL 8 9   ALK PHOS U/L 58   ALT U/L 104*   AST U/L 72*              Results from last 7 days   Lab Units 04/13/20  1747   INR  1 21*   PTT seconds 32     Results from last 7 days   Lab Units 04/13/20  1746   LACTIC ACID mmol/L 1 6     0   Lab Value Date/Time    TROPONINI <0 02 04/13/2020 1746       Imaging:   CXR: Patchy bilateral airspace disease could be indicative of multifocal pneumonia including viral etiologies  I have personally reviewed pertinent reports  and I have personally reviewed pertinent films in PACS  EKG: NSR This was personally reviewed by myself  Micro:  Lab Results   Component Value Date    BLOODCX Received in Microbiology Lab  Culture in Progress  04/13/2020    BLOODCX Received in Microbiology Lab  Culture in Progress  04/13/2020       Counseling / Coordination of Care  Total Critical Care time spent 25 minutes excluding procedures, teaching and family updates  ______________________________________________________________________    VTE Pharmacologic Prophylaxis: Heparin  VTE Mechanical Prophylaxis: sequential compression device    Invasive lines and devices: Invasive Devices     Peripheral Intravenous Line            Peripheral IV 04/13/20 Left Forearm less than 1 day                Code Status: Level 1 - Full Code  POA:    POLST:      Portions of the record may have been created with voice recognition software  Occasional wrong word or "sound a like" substitutions may have occurred due to the inherent limitations of voice recognition software  Read the chart carefully and recognize, using context, where substitutions have occurred        Teresa Serna PA-C

## 2020-04-14 NOTE — H&P
H&P- [address] 1970, 44 y o  male MRN: [id number]    Unit/Bed#: CW2 214-01 Encounter: [id number]    Primary Care Provider: Gene Wilkinson MD   Date and time admitted to hospital: 4/13/2020  4:58 PM        COVID-19 virus infection  Assessment & Plan  - Pt is COVID positive since 04/06  - Symptomatic since 04/02 with fever, tested positive on 04/06, seen in respiratory clinic on 04/10 O2 sat >90%  - Now presenting with 1 day worsening SOB and fever of 103  - ED: tachypneic and hypoxic < 90%, placed on 8L NC to maintain O2 sats, afebrile at admission  - Lung exam: diffuse rales throughout and decreased breath sounds throughout but more pronounced in the bases  - Labs: VBG w/ pCO2 41, pH 7 4; CMP w/ Na 134, K 3 6, AST elevated at 72 and ALT elevated at 104, Albumin decreased at 2 5; ; ; trop and pro-BNP normal; Ferritin 2,650; lactic acid nl; CBC with WBC 11 66, H&H wnl, % neutros 89%; ANC 10 41 number; PT/INR 14 9/1 21; ; D-dimer elevated at 1 07; Procal 0 56  - Vitamin D level ordered; if below 50, will order D3 2000 Iu qD  - CXR:  Patchy infiltrates bilaterally, hazy on wet read  - EKG qtc 423  - COVID moderate pathway: started on Rocephin 1g, Azithromycin 500mg, Plaquenil 400mg, Solumedrol 2mg/kg    * Plan: will continue to monitor O2 status; high alert for worsening hypoxia and SOB w/ increased O2 need; if patient requires 10L NC will consult CCM and ID  * Repeat labs ordered for the morning  * Continue Rocephin, Azithro and Plaquenil at current dosing unless patient decompensates then switch to severe pathway      Diet:  Regular house  DVT Prophylaxis: VTE Pharmacologic Prophylaxis: Enoxaparin (Lovenox)  VTE Mechanical Prophylaxis: sequential compression device  Code Status: Full  Disposition: Discussed with Dr Cortez Ferguson and [facility]     SUBJECTIVE  HPI:  44 y o  male w/ no significant PMHx who presented for worsening SOB   He has been symptomatic since April 2nd with fever, he tested positive for COVID on April 6, and was most recently seen in the respiratory clinic on the 10th  He was given Advair inhaler and cough medicine  In the clinic his O2 sat was >90% while sitting and above 95% while ambulating  He had a phone visit on Sunday and reported that he felt the same as before his visit to the respiratory clinic  He reports a 1 day history of worsening shortness of breath  He had a virtual visit today and was advised to come to the ED for further intervention  He reports still fevering to 103 this morning and has had a fever daily  He does not report worsening of cough  He denies any headache, dizziness or lightheadedness  He denies any GI issues  He denies any nasal congestion, itchy/watery eyes, or runny nose  ED Management: COVID labs ordered, CXR completed; Rocephin, Solumedrol, Azithromycin and Plaquenil started in accordance with the moderated pathway    Review of Systems   Constitutional: Positive for fatigue and fever  HENT: Negative for congestion, sneezing and sore throat  Eyes: Negative  Respiratory: Positive for cough and shortness of breath  Negative for chest tightness  Cardiovascular: Negative for chest pain, palpitations and leg swelling  Gastrointestinal: Negative for abdominal pain  Genitourinary: Negative for difficulty urinating and dysuria  Musculoskeletal: Negative for arthralgias and back pain  Skin: Negative  Allergic/Immunologic: Negative  Neurological: Negative for dizziness, seizures, syncope, weakness, light-headedness and headaches  Psychiatric/Behavioral: Negative for confusion and decreased concentration  The patient is not nervous/anxious  Historical Information   History reviewed  No pertinent past medical history  History reviewed  No pertinent surgical history    Social History   Social History     Substance and Sexual Activity   Alcohol Use Yes    Frequency: 2-3 times a week    Drinks per session: 5 or 6 Social History     Substance and Sexual Activity   Drug Use Never     Social History     Tobacco Use   Smoking Status Former Smoker    Types: Cigarettes   Smokeless Tobacco Never Used     Family History:   Family History   Problem Relation Age of Onset    No Known Problems Mother     No Known Problems Father        Meds/Allergies   all medications and allergies reviewed  No Known Allergies    OBJECTIVE  Vitals: Blood pressure 127/79, pulse 92, temperature 99 4 °F (37 4 °C), temperature source Oral, resp  rate 20, SpO2 95 %  Intake/Output Summary (Last 24 hours) at 4/13/2020 2144  Last data filed at 4/13/2020 2032  Gross per 24 hour   Intake 50 ml   Output    Net 50 ml       Invasive Devices     Peripheral Intravenous Line            Peripheral IV 04/13/20 Left Forearm less than 1 day                Physical Exam   Constitutional: He is oriented to person, place, and time  He appears well-developed and well-nourished  HENT:   Head: Normocephalic and atraumatic  Nose: Nose normal    Mouth/Throat: Oropharynx is clear and moist    Eyes: Pupils are equal, round, and reactive to light  Conjunctivae and EOM are normal    Cardiovascular: Normal rate, regular rhythm, normal heart sounds and intact distal pulses  Pulmonary/Chest: He is in respiratory distress  He has rales (throughout all lung fields )  He exhibits no tenderness  Breath sounds diminished in the bases     Abdominal: Soft  Bowel sounds are normal    Musculoskeletal: Normal range of motion  He exhibits no edema or tenderness  Neurological: He is alert and oriented to person, place, and time  Skin: Skin is warm and dry  Capillary refill takes less than 2 seconds  No rash noted  He is not diaphoretic  No erythema  Psychiatric: He has a normal mood and affect   His behavior is normal          Lab Results:   Recent Results (from the past 12 hour(s))   Urine Macroscopic, POC    Collection Time: 04/13/20  5:44 PM   Result Value Ref Range Color, UA      Clarity, UA      pH, UA      Leukocytes, UA      Nitrite, UA      Protein, UA      Glucose, UA      Ketones, UA      Urobilinogen, UA      Bilirubin, UA      Blood, UA      Specific Felt, UA     CBC and differential    Collection Time: 04/13/20  5:46 PM   Result Value Ref Range    WBC 11 66 (H) 4 31 - 10 16 Thousand/uL    RBC 5 22 3 88 - 5 62 Million/uL    Hemoglobin 14 8 12 0 - 17 0 g/dL    Hematocrit 44 8 36 5 - 49 3 %    MCV 86 82 - 98 fL    MCH 28 4 26 8 - 34 3 pg    MCHC 33 0 31 4 - 37 4 g/dL    RDW 12 4 11 6 - 15 1 %    MPV 10 3 8 9 - 12 7 fL    Platelets 661 319 - 291 Thousands/uL    nRBC 0 /100 WBCs    Neutrophils Relative 89 (H) 43 - 75 %    Immat GRANS % 1 0 - 2 %    Lymphocytes Relative 7 (L) 14 - 44 %    Monocytes Relative 3 (L) 4 - 12 %    Eosinophils Relative 0 0 - 6 %    Basophils Relative 0 0 - 1 %    Neutrophils Absolute 10 41 (H) 1 85 - 7 62 Thousands/µL    Immature Grans Absolute 0 06 0 00 - 0 20 Thousand/uL    Lymphocytes Absolute 0 81 0 60 - 4 47 Thousands/µL    Monocytes Absolute 0 37 0 17 - 1 22 Thousand/µL    Eosinophils Absolute 0 00 0 00 - 0 61 Thousand/µL    Basophils Absolute 0 01 0 00 - 0 10 Thousands/µL   Comprehensive metabolic panel    Collection Time: 04/13/20  5:46 PM   Result Value Ref Range    Sodium 134 (L) 136 - 145 mmol/L    Potassium 3 6 3 5 - 5 3 mmol/L    Chloride 101 100 - 108 mmol/L    CO2 25 21 - 32 mmol/L    ANION GAP 8 4 - 13 mmol/L    BUN 12 5 - 25 mg/dL    Creatinine 0 70 0 60 - 1 30 mg/dL    Glucose 115 65 - 140 mg/dL    Calcium 8 9 8 3 - 10 1 mg/dL    AST 72 (H) 5 - 45 U/L     (H) 12 - 78 U/L    Alkaline Phosphatase 58 46 - 116 U/L    Total Protein 7 7 6 4 - 8 2 g/dL    Albumin 2 5 (L) 3 5 - 5 0 g/dL    Total Bilirubin 0 47 0 20 - 1 00 mg/dL    eGFR 109 ml/min/1 73sq m   Lactic acid x2    Collection Time: 04/13/20  5:46 PM   Result Value Ref Range    LACTIC ACID 1 6 0 5 - 2 0 mmol/L   Procalcitonin    Collection Time: 04/13/20  5:46 PM Result Value Ref Range    Procalcitonin 0 56 (H) <=0 25 ng/ml   C-reactive protein    Collection Time: 04/13/20  5:46 PM   Result Value Ref Range     0 (H) <3 0 mg/L   Ferritin    Collection Time: 04/13/20  5:46 PM   Result Value Ref Range    Ferritin 2,650 (H) 8 - 388 ng/mL   Troponin I    Collection Time: 04/13/20  5:46 PM   Result Value Ref Range    Troponin I <0 02 <=0 04 ng/mL   LD,Blood    Collection Time: 04/13/20  5:46 PM   Result Value Ref Range     (H) 81 - 234 U/L   CK (with reflex to MB)    Collection Time: 04/13/20  5:46 PM   Result Value Ref Range    Total  (H) 39 - 308 U/L   NT-BNP PRO    Collection Time: 04/13/20  5:46 PM   Result Value Ref Range    NT-proBNP 108 <125 pg/mL   CKMB    Collection Time: 04/13/20  5:46 PM   Result Value Ref Range    CK-MB Index <1 0 0 0 - 2 5 %    CK-MB 1 8 0 0 - 5 0 ng/mL   Protime-INR    Collection Time: 04/13/20  5:47 PM   Result Value Ref Range    Protime 14 9 (H) 11 6 - 14 5 seconds    INR 1 21 (H) 0 84 - 1 19   APTT    Collection Time: 04/13/20  5:47 PM   Result Value Ref Range    PTT 32 23 - 37 seconds   D-dimer, quantitative    Collection Time: 04/13/20  5:47 PM   Result Value Ref Range    D-Dimer, Quant 1 07 (H) <0 50 ug/ml FEU   Blood gas, venous    Collection Time: 04/13/20  6:58 PM   Result Value Ref Range    pH, [place] 7 422 (H) 7 300 - 7 400    pCO2, [place] 41 2 (L) 42 0 - 50 0 mm Hg    pO2, [place] 38 6 35 0 - 45 0 mm Hg    HCO3, [place] 26 2 24 - 30 mmol/L    Base Excess, [place] 1 6 mmol/L    O2 Content, [place] 16 2 ml/dL    O2 HGB, VENOUS 72 9 60 0 - 80 0 %         Imaging:   XR chest 1 view portable    (Results Pending)         EKG, Pathology, and Other Studies:   EKG: NSR, no ST segment abnormalities or inversions      Counseling / Coordination of Care  Total floor / unit time spent today 30 minutes  Greater than 50% of total time was spent with the patient and / or family counseling and / or coordination of care    A description of the counseling / coordination of care  Code Status: No Order  Advance Directive and Living Will:      Power of :    POLST:      The senior resident, Dr Harshil Mckeon, saw and examined the patient and agreed with the plan      MD Galindo Hawley  2  PGY-1  4/13/2020  9:44 PM

## 2020-04-14 NOTE — PROGRESS NOTES
SENIOR History and Physical Note - Nehemias Lim 44 y o  male MRN: [id number]    Unit/Bed#: CW2 214-01 Encounter: [id number]      HPI  Sunita Hart is a 44 y o  male with a  no significant past medical history who was referred to [facility] ED with SOB in setting of COVID + since 04/06/20  Patient had a virtual visit with PCP today, noted to have worsening dyspnea during visit  Visited resp clinic on 04/10, started on Advair & Bromfed DM, reports mild relief with inhaler  Patient noted to have acutely worsening shortness of breath which began last night, reported SpO2 of 94-96%, 90% while walking, per PCP  Patient endorses that he is currently comfortable w/ interventions provided by ED: 8LPM      Blood work, imaging, physical exam  Review of blood work, imaging and physical examination revealed moderate- borderline severe COVID  ED resident [address] discussed patient w/ CCM Dr Linares, per resident advised patient is floor appropriate as SD1  Results from last 7 days   Lab Units 04/13/20  1746   WBC Thousand/uL 11 66*   HEMOGLOBIN g/dL 14 8   HEMATOCRIT % 44 8   PLATELETS Thousands/uL 333   NEUTROS PCT % 89*   MONOS PCT % 3*     Results from last 7 days   Lab Units 04/13/20  1746   POTASSIUM mmol/L 3 6   CHLORIDE mmol/L 101   CO2 mmol/L 25   BUN mg/dL 12   CREATININE mg/dL 0 70   CALCIUM mg/dL 8 9   ALK PHOS U/L 58   ALT U/L 104*   AST U/L 72*         No results found for: PHOS   Results from last 7 days   Lab Units 04/13/20  1747   INR  1 21*   PTT seconds 32     Results from last 7 days   Lab Units 04/13/20  1746   LACTIC ACID mmol/L 1 6     0   Lab Value Date/Time    TROPONINI <0 02 04/13/2020 1746     ABG:No results found for: PHART, XUE9EVT, PO2ART, SCN4JSG, N1OEGECR, BEART, SOURCE      Physical Exam   Constitutional: He is oriented to person, place, and time  He appears well-developed and well-nourished  He appears ill  He appears distressed (labored breathing when speaking)     HENT:   Head: Normocephalic and atraumatic  Eyes: Conjunctivae are normal  No scleral icterus  Neck: Normal range of motion  Cardiovascular: Normal rate, regular rhythm, normal heart sounds and intact distal pulses  Pulmonary/Chest: He is in respiratory distress  He has rales  Crackles BL to mid lungs  Diminished @ bases   Abdominal: Soft  Bowel sounds are normal  There is no tenderness  Neurological: He is alert and oriented to person, place, and time  Skin: Skin is warm and dry  Capillary refill takes less than 2 seconds  He is not diaphoretic  Psychiatric: He has a normal mood and affect  Vitals reviewed  Assessment and Plan  I agree with Dr Holloway's admission for evaluation and management of moderate-borderline severe covid:    1  COVID +   - s/p on Plaquenil 400 mg x1, azithromycin 500 mg X 1  - continue azithromycin 250 mg x 5 days, procal ordered  - continue Plaquenil x1 more dose of 400 mg and then 200 mg BID x 5 days, unless disease progression  - a m  G6PD ordered  - ferritin noted to be 2400+, started IV Solu-Medrol 2 mg/kg   - continue Vitamin C 1g bid  - continue zinc 200 mg QD  - continue Lipitor 40 mg qhs  - self proning    - CBC and CMP q a m   - monitor QTC on tele, in   - trend cardiac markers  - continue Rocephin 1 mg q d , a m  Procal ordered  - IL 6 & Vitamin D added on, will add 2000 IU QD if appropriate  - dicussed w/ CCM AP, pt currently stable does not meet severe criteria, will consult if O2 reqs increase, will also consult ID +/- ECMO Doc if pt meets criteria        Anticipate > two midnight stay  I have personally seen and examined patient  I agree with the intern's documentation in the history and physical  Please contact [facility]'s family medicine via tigertext with any questions  Discussed above with Dr Modesto Burns MD  [address] [facility] [employer] Medicine PGY2  4/13/2020  9:20 PM

## 2020-04-14 NOTE — PLAN OF CARE
Problem: PAIN - ADULT  Goal: Verbalizes/displays adequate comfort level or baseline comfort level  Description  Interventions:  - Encourage patient to monitor pain and request assistance  - Assess pain using appropriate pain scale  - Administer analgesics based on type and severity of pain and evaluate response  - Implement non-pharmacological measures as appropriate and evaluate response  - Consider cultural and social influences on pain and pain management  - Notify physician/advanced practitioner if interventions unsuccessful or patient reports new pain  Outcome: Progressing     Problem: INFECTION - ADULT  Goal: Absence or prevention of progression during hospitalization  Description  INTERVENTIONS:  - Assess and monitor for signs and symptoms of infection  - Monitor lab/diagnostic results  - Monitor all insertion sites, i e  indwelling lines, tubes, and drains  - Monitor endotracheal if appropriate and nasal secretions for changes in amount and color  - [place] appropriate cooling/warming therapies per order  - Administer medications as ordered  - Instruct and encourage patient and family to use good hand hygiene technique  - Identify and instruct in appropriate isolation precautions for identified infection/condition  Outcome: Progressing  Goal: Absence of fever/infection during neutropenic period  Description  INTERVENTIONS:  - Monitor WBC    Outcome: Progressing     Problem: SAFETY ADULT  Goal: Patient will remain free of falls  Description  INTERVENTIONS:  - Assess patient frequently for physical needs  -  Identify cognitive and physical deficits and behaviors that affect risk of falls    -  [place] fall precautions as indicated by assessment   - Educate patient/family on patient safety including physical limitations  - Instruct patient to call for assistance with activity based on assessment  - Modify environment to reduce risk of injury  - Consider OT/PT consult to assist with strengthening/mobility  Outcome: Progressing  Goal: Maintain or return to baseline ADL function  Description  INTERVENTIONS:  -  Assess patient's ability to carry out ADLs; assess patient's baseline for ADL function and identify physical deficits which impact ability to perform ADLs (bathing, care of mouth/teeth, toileting, grooming, dressing, etc )  - Assess/evaluate cause of self-care deficits   - Assess range of motion  - Assess patient's mobility; develop plan if impaired  - Assess patient's need for assistive devices and provide as appropriate  - Encourage maximum independence but intervene and supervise when necessary  - Involve family in performance of ADLs  - Assess for home care needs following discharge   - Consider OT consult to assist with ADL evaluation and planning for discharge  - Provide patient education as appropriate  Outcome: Progressing  Goal: Maintain or return mobility status to optimal level  Description  INTERVENTIONS:  - Assess patient's baseline mobility status (ambulation, transfers, stairs, etc )    - Identify cognitive and physical deficits and behaviors that affect mobility  - Identify mobility aids required to assist with transfers and/or ambulation (gait belt, sit-to-stand, lift, walker, cane, etc )  - [place] fall precautions as indicated by assessment  - Record patient progress and toleration of activity level on Mobility SBAR; progress patient to next Phase/Stage  - Instruct patient to call for assistance with activity based on assessment  - Consider rehabilitation consult to assist with strengthening/weightbearing, etc   Outcome: Progressing     Problem: DISCHARGE PLANNING  Goal: Discharge to home or other facility with appropriate resources  Description  INTERVENTIONS:  - Identify barriers to discharge w/patient and caregiver  - Arrange for needed discharge resources and transportation as appropriate  - Identify discharge learning needs (meds, wound care, etc )  - Arrange for interpretive services to assist at discharge as needed  - Refer to Case Management Department for coordinating discharge planning if the patient needs post-hospital services based on physician/advanced practitioner order or complex needs related to functional status, cognitive ability, or social support system  Outcome: Progressing     Problem: Knowledge Deficit  Goal: Patient/family/caregiver demonstrates understanding of disease process, treatment plan, medications, and discharge instructions  Description  Complete learning assessment and assess knowledge base  Interventions:  - Provide teaching at level of understanding  - Provide teaching via preferred learning methods  Outcome: Progressing     Problem: DISCHARGE PLANNING - CARE MANAGEMENT  Goal: Discharge to post-acute care or home with appropriate resources  Description  INTERVENTIONS:  - Conduct assessment to determine patient/family and health care team treatment goals, and need for post-acute services based on payer coverage, community resources, and patient preferences, and barriers to discharge  - Address psychosocial, clinical, and financial barriers to discharge as identified in assessment in conjunction with the patient/family and health care team  - Arrange appropriate level of post-acute services according to patients   needs and preference and payer coverage in collaboration with the physician and health care team  - Communicate with and update the patient/family, physician, and health care team regarding progress on the discharge plan  - Arrange appropriate transportation to post-acute venues  Outcome: Progressing     Problem: Nutrition/Hydration-ADULT  Goal: Nutrient/Hydration intake appropriate for improving, restoring or maintaining nutritional needs  Description  Monitor and assess patient's nutrition/hydration status for malnutrition  Collaborate with interdisciplinary team and initiate plan and interventions as ordered    Monitor patient's weight and dietary intake as ordered or per policy  Utilize nutrition screening tool and intervene as necessary  Determine patient's food preferences and provide high-protein, high-caloric foods as appropriate       INTERVENTIONS:  - Monitor oral intake, urinary output, labs, and treatment plans  - Assess nutrition and hydration status and recommend course of action  - Evaluate amount of meals eaten  - Assist patient with eating if necessary   - Allow adequate time for meals  - Recommend/ encourage appropriate diets, oral nutritional supplements, and vitamin/mineral supplements  - Order, calculate, and assess calorie counts as needed  - Recommend, monitor, and adjust tube feedings and TPN/PPN based on assessed needs  - Assess need for intravenous fluids  - Provide specific nutrition/hydration education as appropriate  - Include patient/family/caregiver in decisions related to nutrition  Outcome: Progressing     Problem: RESPIRATORY - ADULT  Goal: Achieves optimal ventilation and oxygenation  Description  INTERVENTIONS:  - Assess for changes in respiratory status  - Assess for changes in mentation and behavior  - Position to facilitate oxygenation and minimize respiratory effort  - Oxygen administered by appropriate delivery if ordered  - Initiate smoking cessation education as indicated  - Encourage broncho-pulmonary hygiene including cough, deep breathe, Incentive Spirometry  - Assess the need for suctioning and aspirate as needed  - Assess and instruct to report SOB or any respiratory difficulty  - Respiratory Therapy support as indicated  Outcome: Progressing     Problem: METABOLIC, FLUID AND ELECTROLYTES - ADULT  Goal: Electrolytes maintained within normal limits  Description  INTERVENTIONS:  - Monitor labs and assess patient for signs and symptoms of electrolyte imbalances  - Administer electrolyte replacement as ordered  - Monitor response to electrolyte replacements, including repeat lab results as appropriate  - Instruct patient on fluid and nutrition as appropriate  Outcome: Progressing  Goal: Fluid balance maintained  Description  INTERVENTIONS:  - Monitor labs   - Monitor I/O and WT  - Instruct patient on fluid and nutrition as appropriate  - Assess for signs & symptoms of volume excess or deficit  Outcome: Progressing  Goal: Glucose maintained within target range  Description  INTERVENTIONS:  - Monitor Blood Glucose as ordered  - Assess for signs and symptoms of hyperglycemia and hypoglycemia  - Administer ordered medications to maintain glucose within target range  - Assess nutritional intake and initiate nutrition service referral as needed  Outcome: Progressing

## 2020-04-14 NOTE — PROGRESS NOTES
Progress Note - [address] 1970, 44 y o  male MRN: [id number]    Unit/Bed#: CW2 214-01 Encounter: [id number]    Primary Care Provider: Brad Zimmerman MD   Date and time admitted to hospital: 2020  4:58 PM        Diarrhea  Assessment & Plan  Likely due to COVID-19  Due to leukocytosis will check for C diff  Start probiotic    Vitamin D deficiency  Assessment & Plan  Start supplementation    Acute respiratory failure with hypoxia ([address] )  Assessment & Plan  As above    * COVID-19 virus infection  Assessment & Plan  Positive COVID-19 PCR  Severe disease due to viral pneumonia and and high oxygen requirement  With acute respiratory failure  Change to high flow oxygen  Continue prone positioning  Continue ceftriaxone, azithromycin, corticosteroids, hydroxychloroquine, vitamin C, Zinc, atorvastatin, vitamin D  Wean oxygen as tolerated  Monitor Procalcitonin, ferritin, CRP, CBC, temps          VTE Pharmacologic Prophylaxis:   Pharmacologic: Enoxaparin (Lovenox)  Mechanical VTE Prophylaxis in Place: Yes    Patient Centered Rounds: I have performed bedside rounds with nursing staff today  Discussions with Specialists or Other Care Team Provider: ID    Education and Discussions with Family / Patient: patient, plan of care    Time Spent for Care: 20 minutes  More than 50% of total time spent on counseling and coordination of care as described above  Current Length of Stay: 1 day(s)    Current Patient Status: Inpatient   Certification Statement: The patient will continue to require additional inpatient hospital stay due to respiratory failure    Discharge Plan: home when stable    Code Status: Level 1 - Full Code      Subjective:   Reports diarrhea, no abdominal pain, or nausea  Has SOB with dry cough   Improved with proning    Objective:     Vitals:   Temp (24hrs), Av 8 °F (37 1 °C), Min:97 7 °F (36 5 °C), Max:99 4 °F (37 4 °C)    Temp:  [97 7 °F (36 5 °C)-99 4 °F (37 4 °C)] 97 7 °F (36 5 °C)  HR:  [83-98] 87  Resp:  [20-32] 20  BP: (120-140)/(57-83) 126/79  SpO2:  [80 %-99 %] 88 %  Body mass index is 35 02 kg/m²  Input and Output Summary (last 24 hours): Intake/Output Summary (Last 24 hours) at 4/14/2020 1014  Last data filed at 4/14/2020 0616  Gross per 24 hour   Intake 1050 ml   Output 700 ml   Net 350 ml       Physical Exam:     Physical Exam   Constitutional: He is oriented to person, place, and time  He appears well-developed and well-nourished  Lying prone in bed   HENT:   Head: Normocephalic and atraumatic  Eyes: Pupils are equal, round, and reactive to light  EOM are normal  No scleral icterus  Neck: Neck supple  No JVD present  Pulmonary/Chest: Effort normal  No respiratory distress  Musculoskeletal: He exhibits no edema  Neurological: He is alert and oriented to person, place, and time  Skin: Skin is warm and dry  He is not diaphoretic  Psychiatric: He has a normal mood and affect  His behavior is normal          Additional Data:     Labs:    Results from last 7 days   Lab Units 04/14/20  0503  04/13/20  1746   WBC Thousand/uL 8 79  --  11 66*   HEMOGLOBIN g/dL 14 1  --  14 8   HEMATOCRIT % 43 6  --  44 8   PLATELETS Thousands/uL 349   < > 333   NEUTROS PCT %  --   --  89*   LYMPHS PCT %  --   --  7*   LYMPHO PCT % 0*  --   --    MONOS PCT %  --   --  3*   MONO PCT % 0*  --   --    EOS PCT % 0  --  0    < > = values in this interval not displayed       Results from last 7 days   Lab Units 04/14/20  0503   SODIUM mmol/L 138   POTASSIUM mmol/L 4 0   CHLORIDE mmol/L 107   CO2 mmol/L 25   BUN mg/dL 14   CREATININE mg/dL 0 63   ANION GAP mmol/L 6   CALCIUM mg/dL 8 7   ALBUMIN g/dL 2 1*   TOTAL BILIRUBIN mg/dL 0 32   ALK PHOS U/L 53   ALT U/L 117*   AST U/L 107*   GLUCOSE RANDOM mg/dL 130     Results from last 7 days   Lab Units 04/14/20  0503   INR  1 21*             Results from last 7 days   Lab Units 04/14/20  0503 04/13/20  1746   LACTIC ACID mmol/L  --  1 6 PROCALCITONIN ng/ml 0 40* 0 56*           * I Have Reviewed All Lab Data Listed Above  * Additional Pertinent Lab Tests Reviewed: All Labs Within Last 24 Hours Reviewed    Imaging:    Imaging Reports Reviewed Today Include: CXR    Recent Cultures (last 7 days):     Results from last 7 days   Lab Units 04/13/20  1746 04/13/20  1733   BLOOD CULTURE  Received in Microbiology Lab  Culture in Progress  Received in Microbiology Lab  Culture in Progress  Last 24 Hours Medication List:     Current Facility-Administered Medications:  ascorbic acid 1,000 mg Oral BID Flower Lincoln MD   atorvastatin 40 mg Oral HS Flower Lincoln MD   azithromycin 250 mg Oral Q24H Flower Lincoln MD   cefTRIAXone 1,000 mg Intravenous Q24H Flower Lincoln MD   cholecalciferol 2,000 Units Oral Daily Leonel Sanchez MD   heparin (porcine) 5,000 Units Subcutaneous [facility] Flower Lincoln MD   hydroxychloroquine 200 mg Oral Q8H Flower Lincoln MD   methylPREDNISolone sodium succinate 200 mg Intravenous Daily Flower Lincoln MD   saccharomyces boulardii 250 mg Oral BID Leonel Sanchez MD   zinc sulfate 220 mg Oral Daily Flower Lincoln MD        Today, Patient Was Seen By: Deondre Blanco MD    ** Please Note: Dictation voice to text software may have been used in the creation of this document   **

## 2020-04-14 NOTE — PLAN OF CARE
Problem: PAIN - ADULT  Goal: Verbalizes/displays adequate comfort level or baseline comfort level  Description  Interventions:  - Encourage patient to monitor pain and request assistance  - Assess pain using appropriate pain scale  - Administer analgesics based on type and severity of pain and evaluate response  - Implement non-pharmacological measures as appropriate and evaluate response  - Consider cultural and social influences on pain and pain management  - Notify physician/advanced practitioner if interventions unsuccessful or patient reports new pain  4/14/2020 0044 by Sarah Stout RN  Outcome: Progressing  4/13/2020 2215 by Sarah Stout RN  Outcome: Progressing     Problem: INFECTION - ADULT  Goal: Absence or prevention of progression during hospitalization  Description  INTERVENTIONS:  - Assess and monitor for signs and symptoms of infection  - Monitor lab/diagnostic results  - Monitor all insertion sites, i e  indwelling lines, tubes, and drains  - Monitor endotracheal if appropriate and nasal secretions for changes in amount and color  - [place] appropriate cooling/warming therapies per order  - Administer medications as ordered  - Instruct and encourage patient and family to use good hand hygiene technique  - Identify and instruct in appropriate isolation precautions for identified infection/condition  4/14/2020 0044 by Sarah Stout RN  Outcome: Progressing  4/13/2020 2215 by Sarah Stout RN  Outcome: Progressing  Goal: Absence of fever/infection during neutropenic period  Description  INTERVENTIONS:  - Monitor WBC    4/14/2020 0044 by Sarah Stout RN  Outcome: Progressing  4/13/2020 2215 by Sarah Stout RN  Outcome: Progressing     Problem: SAFETY ADULT  Goal: Patient will remain free of falls  Description  INTERVENTIONS:  - Assess patient frequently for physical needs  -  Identify cognitive and physical deficits and behaviors that affect risk of falls    - [place] fall precautions as indicated by assessment   - Educate patient/family on patient safety including physical limitations  - Instruct patient to call for assistance with activity based on assessment  - Modify environment to reduce risk of injury  - Consider OT/PT consult to assist with strengthening/mobility  4/14/2020 0044 by Monique Ayon RN  Outcome: Progressing  4/13/2020 2215 by Monique Ayon RN  Outcome: Progressing  Goal: Maintain or return to baseline ADL function  Description  INTERVENTIONS:  -  Assess patient's ability to carry out ADLs; assess patient's baseline for ADL function and identify physical deficits which impact ability to perform ADLs (bathing, care of mouth/teeth, toileting, grooming, dressing, etc )  - Assess/evaluate cause of self-care deficits   - Assess range of motion  - Assess patient's mobility; develop plan if impaired  - Assess patient's need for assistive devices and provide as appropriate  - Encourage maximum independence but intervene and supervise when necessary  - Involve family in performance of ADLs  - Assess for home care needs following discharge   - Consider OT consult to assist with ADL evaluation and planning for discharge  - Provide patient education as appropriate  4/14/2020 0044 by Monique Ayon RN  Outcome: Progressing  4/13/2020 2215 by Monique Ayon RN  Outcome: Progressing  Goal: Maintain or return mobility status to optimal level  Description  INTERVENTIONS:  - Assess patient's baseline mobility status (ambulation, transfers, stairs, etc )    - Identify cognitive and physical deficits and behaviors that affect mobility  - Identify mobility aids required to assist with transfers and/or ambulation (gait belt, sit-to-stand, lift, walker, cane, etc )  - [place] fall precautions as indicated by assessment  - Record patient progress and toleration of activity level on Mobility SBAR; progress patient to next Phase/Stage  - Instruct patient to call for assistance with activity based on assessment  - Consider rehabilitation consult to assist with strengthening/weightbearing, etc   4/14/2020 0044 by Zac Gomez RN  Outcome: Progressing  4/13/2020 2215 by Zac Gomez RN  Outcome: Progressing     Problem: DISCHARGE PLANNING  Goal: Discharge to home or other facility with appropriate resources  Description  INTERVENTIONS:  - Identify barriers to discharge w/patient and caregiver  - Arrange for needed discharge resources and transportation as appropriate  - Identify discharge learning needs (meds, wound care, etc )  - Arrange for interpretive services to assist at discharge as needed  - Refer to Case Management Department for coordinating discharge planning if the patient needs post-hospital services based on physician/advanced practitioner order or complex needs related to functional status, cognitive ability, or social support system  4/14/2020 0044 by Zac Gomez RN  Outcome: Progressing  4/13/2020 2215 by Zac Gomez RN  Outcome: Progressing     Problem: Knowledge Deficit  Goal: Patient/family/caregiver demonstrates understanding of disease process, treatment plan, medications, and discharge instructions  Description  Complete learning assessment and assess knowledge base    Interventions:  - Provide teaching at level of understanding  - Provide teaching via preferred learning methods  4/14/2020 0044 by Zac Gomez RN  Outcome: Progressing  4/13/2020 2215 by Zac Gomez RN  Outcome: Progressing     Problem: DISCHARGE PLANNING - CARE MANAGEMENT  Goal: Discharge to post-acute care or home with appropriate resources  Description  INTERVENTIONS:  - Conduct assessment to determine patient/family and health care team treatment goals, and need for post-acute services based on payer coverage, community resources, and patient preferences, and barriers to discharge  - Address psychosocial, clinical, and financial barriers to discharge as identified in assessment in conjunction with the patient/family and health care team  - Arrange appropriate level of post-acute services according to patients   needs and preference and payer coverage in collaboration with the physician and health care team  - Communicate with and update the patient/family, physician, and health care team regarding progress on the discharge plan  - Arrange appropriate transportation to post-acute venues  4/14/2020 0044 by Christa Gilbert RN  Outcome: Progressing  4/13/2020 2215 by Christa Gilbert RN  Outcome: Progressing     Problem: Nutrition/Hydration-ADULT  Goal: Nutrient/Hydration intake appropriate for improving, restoring or maintaining nutritional needs  Description  Monitor and assess patient's nutrition/hydration status for malnutrition  Collaborate with interdisciplinary team and initiate plan and interventions as ordered  Monitor patient's weight and dietary intake as ordered or per policy  Utilize nutrition screening tool and intervene as necessary  Determine patient's food preferences and provide high-protein, high-caloric foods as appropriate       INTERVENTIONS:  - Monitor oral intake, urinary output, labs, and treatment plans  - Assess nutrition and hydration status and recommend course of action  - Evaluate amount of meals eaten  - Assist patient with eating if necessary   - Allow adequate time for meals  - Recommend/ encourage appropriate diets, oral nutritional supplements, and vitamin/mineral supplements  - Order, calculate, and assess calorie counts as needed  - Recommend, monitor, and adjust tube feedings and TPN/PPN based on assessed needs  - Assess need for intravenous fluids  - Provide specific nutrition/hydration education as appropriate  - Include patient/family/caregiver in decisions related to nutrition  4/14/2020 0044 by Christa Gilbert RN  Outcome: Progressing  4/13/2020 2215 by Christa Gilbert RN  Outcome: Progressing Problem: RESPIRATORY - ADULT  Goal: Achieves optimal ventilation and oxygenation  Description  INTERVENTIONS:  - Assess for changes in respiratory status  - Assess for changes in mentation and behavior  - Position to facilitate oxygenation and minimize respiratory effort  - Oxygen administered by appropriate delivery if ordered  - Initiate smoking cessation education as indicated  - Encourage broncho-pulmonary hygiene including cough, deep breathe, Incentive Spirometry  - Assess the need for suctioning and aspirate as needed  - Assess and instruct to report SOB or any respiratory difficulty  - Respiratory Therapy support as indicated  4/14/2020 0044 by Queen Laura RN  Outcome: Progressing  4/13/2020 2215 by Queen Laura RN  Outcome: Progressing     Problem: METABOLIC, FLUID AND ELECTROLYTES - ADULT  Goal: Electrolytes maintained within normal limits  Description  INTERVENTIONS:  - Monitor labs and assess patient for signs and symptoms of electrolyte imbalances  - Administer electrolyte replacement as ordered  - Monitor response to electrolyte replacements, including repeat lab results as appropriate  - Instruct patient on fluid and nutrition as appropriate  4/14/2020 0044 by Queen Laura RN  Outcome: Progressing  4/13/2020 2215 by Queen Laura RN  Outcome: Progressing  Goal: Fluid balance maintained  Description  INTERVENTIONS:  - Monitor labs   - Monitor I/O and WT  - Instruct patient on fluid and nutrition as appropriate  - Assess for signs & symptoms of volume excess or deficit  4/14/2020 0044 by Queen Laura RN  Outcome: Progressing  4/13/2020 2215 by Queen Laura RN  Outcome: Progressing  Goal: Glucose maintained within target range  Description  INTERVENTIONS:  - Monitor Blood Glucose as ordered  - Assess for signs and symptoms of hyperglycemia and hypoglycemia  - Administer ordered medications to maintain glucose within target range  - Assess nutritional intake and initiate nutrition service referral as needed  4/14/2020 0044 by Aga Tierney RN  Outcome: Progressing  4/13/2020 2215 by Aga Tierney RN  Outcome: Progressing

## 2020-04-14 NOTE — ASSESSMENT & PLAN NOTE
-Positive on 4/6  -Continue therapy with lipitor, zinc, vit C   Azith/plaq/ceftriaxone day 1  -Agree with solumedrol  -Checking IL6 and vit D now   -recommend pulmonary consultation in AM  -trend inflammatory markers daily

## 2020-04-14 NOTE — CONSULTS
Consultation - Infectious Disease   Yrn Lim 44 y o  male MRN: [id number]  Unit/Bed#: CW2 214-01 Encounter: [id number]      IMPRESSION & RECOMMENDATIONS:   Impression/Recommendations:  1  Severe COVID multifocal pneumonia  Unclear exposure history  Complicated by # 2  Procalcitonin 0 56 > 0 40, ferritin 2650 > 2942,  > 189, D-dimer 1 07 > 1 29  Rec:  · Continue azithromycin and ceftriaxone for now   · Continue hydroxychloroquine, steroids   · Continue vitamin-C, zinc, atorvastatin  · Follow-up vitamin-D, IL 6, G6PD levels  · Check daily ECG  · Recheck CBC, CRP, Ferritin, procalcitonin in AM    2  Acute hypoxic respiratory failure  Due to # 1  Currently on 15 L O2  Rec:  · Continue antibiotics and COVID management as above  · Follow respiratory status closely    3  Acute transaminitis  Likely due to # 1  Denies abdominal pain  Rec:  · Recheck CMP in a m  · Serial abdominal exam    4  Diarrhea  Suspect due to # 1  Low suspicion for C diff  Rec:  · Follow-up C diff per primary service    5   MO    The above impression and plan was discussed in detail with the patient and Dr Nida Abebe  Antibiotics:  Azithromycin # 2  Ceftriaxone # 2  Hydroxychloroquine # 2  Steroids # 1  Vitamin-C/zinc/atorvastatin # 1    Thank you for this consultation  We will follow along with you  HISTORY OF PRESENT ILLNESS:  Reason for Consult: COVID pneumonia    HPI: Ahsan Cabrera is a 44 y o  male previously healthy  He was in his usual state of health until he developed fever  He was seen by his PCP via telemedicine and was managed conservatively  He had a 2nd telemedicine visit on 04/06 due to worsening fever, cough, myalgias, nausea, and diarrhea  Had a COVID test done at that time which was positive  On 04/10 he was seen at the respiratory clinic for worsening shortness of breath and continued fever  He was noted to have mild hypoxia with ambulation  He was given an Advair inhaler    Yesterday he developed worsening shortness of breath and was referred to the emergency department  Upon presentation he was noted to be afebrile with a normal WBC  He had mild tachycardia and was hypoxic and was placed on 15 L O2  A chest x-ray showed patchy bilateral airspace disease  He was started on azithromycin, ceftriaxone, hydroxychloroquine, vitamin-C, zinc, atorvastatin, and steroids  Patient was seen after returning to the bed from the commode  He is dyspneic and became hypoxic with ambulation  Improved with self proning  Patient states up until his symptom onset he was working as a  in a [employer] building  He did have interactions with other individuals although no known sick contacts  He lives with his wife and 6year-old child at home  His wife is on dialysis and recently started to develop symptoms herself  In performing this consult, I have reviewed prior admission and outpatient visit records in detail  REVIEW OF SYSTEMS:  Denies rhinorrhea or sore throat  Having diarrhea  A complete system-based review of systems is otherwise negative  PAST MEDICAL HISTORY:  History reviewed  No pertinent past medical history  History reviewed  No pertinent surgical history  FAMILY HISTORY:  Non-contributory    SOCIAL HISTORY:  Social History     Substance and Sexual Activity   Alcohol Use Yes    Frequency: 2-3 times a week    Drinks per session: 5 or 6     Social History     Substance and Sexual Activity   Drug Use Never     Social History     Tobacco Use   Smoking Status Former Smoker    Types: Cigarettes   Smokeless Tobacco Never Used       ALLERGIES:  No Known Allergies    MEDICATIONS:  All current active medications have been reviewed      PHYSICAL EXAM:  Vitals:  Temp:  [97 7 °F (36 5 °C)-99 4 °F (37 4 °C)] 97 7 °F (36 5 °C)  HR:  [83-98] 87  Resp:  [20-32] 20  BP: (120-140)/(57-83) 126/79  SpO2:  [80 %-99 %] 88 %  Temp (24hrs), Av 8 °F (37 1 °C), Min:97 7 °F (36 5 °C), Max:99 4 °F (37 4 °C)  Current: Temperature: 97 7 °F (36 5 °C)     Physical Exam:  General:  Well-nourished, well-developed, in no acute distress  Eyes:  Conjunctive clear with no hemorrhages or effusions  Oropharynx:  No ulcers, no lesions  Neck:  Supple, no lymphadenopathy  Lungs:  Dyspnea with normal respiratory excursion, no accessory muscle use  Cardiac:  Tachycardic with a regular rhythm seen on monitor, extremities well-perfused  Abdomen:  Soft, non-tender, non-distended  Extremities:  No peripheral cyanosis, clubbing, or edema  Skin:  No rashes, no ulcers  Neurological:  Moves all four extremities spontaneously, sensation grossly intact    LABS, IMAGING, & OTHER STUDIES:  Lab Results:  I have personally reviewed pertinent labs  Results from last 7 days   Lab Units 04/14/20  0503 04/13/20  1746   POTASSIUM mmol/L 4 0 3 6   CHLORIDE mmol/L 107 101   CO2 mmol/L 25 25   BUN mg/dL 14 12   CREATININE mg/dL 0 63 0 70   EGFR ml/min/1 73sq m 114 109   CALCIUM mg/dL 8 7 8 9   AST U/L 107* 72*   ALT U/L 117* 104*   ALK PHOS U/L 53 58     Results from last 7 days   Lab Units 04/14/20  0503 04/13/20  2356 04/13/20  1746   WBC Thousand/uL 8 79  --  11 66*   HEMOGLOBIN g/dL 14 1  --  14 8   PLATELETS Thousands/uL 349 366 333     Results from last 7 days   Lab Units 04/13/20  1746 04/13/20  1733   BLOOD CULTURE  Received in Microbiology Lab  Culture in Progress  Received in Microbiology Lab  Culture in Progress  Imaging Studies:   I have personally reviewed pertinent imaging study reports and images in PACS  Chest x-ray reviewed personally patchy bilateral opacities    EKG, Pathology, and Other Studies:   I have personally reviewed pertinent reports    QTc 432

## 2020-04-14 NOTE — PROGRESS NOTES
Examined pt @ bedside, inc WOB 8LPM>15LPM meeting severe dx criteria; 2/2 pt ambulating to bathroom  Slow recovery, now back @ spO2 98%  Will formally consult CCM, pt is currently hemodynamically stable, WOB improved, however doubled his O2 reqs in 2 hours, will continue close monitoring  CCM JOÃO Browning @ bedside, evaluated pt, pt remains appropriate as SD1 @ this time

## 2020-04-14 NOTE — CONSULTS
Consultation - Pulmonary Medicine   Marcello Lim 44 y o  male MRN: [id number]  Unit/Bed#: 2 214-01 Encounter: [id number]      Physician Requesting Consult: Trisha Aponte MD  Reason for Consult:  COVID-19 infection, acute respiratory failure      Assessment:  1  Acute hypoxic respiratory failure secondary to COVID-19 infection  2  COVID-19 pneumonia POA with elevated inflammatory markers specially ferritin  3  Presumed superimposed community-acquired pneumonia with elevated procalcitonin and leukocytosis/mild left shift  4  Diarrhea most likely secondary to COVID-19 infection    Plan:   · Continue oxygen titrate for pulse ox more than 88%  · Continue azithromycin on Plaquenil and monitor QTc  · Continue antibiotics with ceftriaxone, probably can switch to oral antibiotics tomorrow to complete course for 5-7 days, follow with ID  · Continue methyl prednisolone  · Start on prednisone 50 mg q 12 hours IV, will start this evening  · Continue Lipitor, vitamin-C and zinc  · Encourage self proning  Patient is compliant  · ID following, to consider Toci if worsening     ______________________________________________________________________    HPI:    Gisselle Avelar is a 44 y o  male who presents with worsening shortness of breath  Patient reports having this current sickness since April 2nd  He developed some fever, and was tested positive on 4/6/2020  He reports progressive shortness of breath/dyspnea on exertion with some cough with mild sputum, presented to the outpatient office and was prescribed Advair  He had the virtual visit and was advised to go to the emergency room with persistent intermittent fever and worsening shortness of breath  Patient denies any headache, denies any dizziness, denies any chest pain or wheezing  He denies nausea or vomiting or abdominal pain but this morning he started to have diarrhea     Patient has no past medical history, he does not take medications at home    He smoked cigarettes remotely for brief time and quit more than 20 years ago and he denies any respiratory disease  He denies travel history but he visited his cousin who developed similar symptoms  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  Review of Systems:  12 points Full review of systems was performed  Aside from what was mentioned in the HPI, it is otherwise negative  Historical Information   History reviewed  No pertinent past medical history  History reviewed  No pertinent surgical history  Social History   Social History     Substance and Sexual Activity   Alcohol Use Yes    Frequency: 2-3 times a week    Drinks per session: 5 or 6     Social History     Tobacco Use   Smoking Status Former Smoker    Types: Cigarettes   Smokeless Tobacco Never Used   Quit more than 20 years ago    Occupational history:  No occupational exposure    Family History:   Family History   Problem Relation Age of Onset    No Known Problems Mother     No Known Problems Father        Medications: The patient's active and prehospital medications were reviewed      Current Facility-Administered Medications:  ascorbic acid 1,000 mg Oral BID Angelica Smith MD   atorvastatin 40 mg Oral HS Angelica Smith MD   azithromycin 250 mg Oral Q24H Angelica Smith MD   cefTRIAXone 1,000 mg Intravenous Q24H Angelica Smith MD   cholecalciferol 2,000 Units Oral Daily Scottie Sarabia MD   enoxaparin 40 mg Subcutaneous Q24H [facility] Scottie Sarabia MD   hydroxychloroquine 200 mg Oral Q8H Angelica Smith MD   methylPREDNISolone sodium succinate 200 mg Intravenous Daily Angelica Smith MD   saccharomyces boulardii 250 mg Oral BID Scottie Sarabia MD   zinc sulfate 220 mg Oral Daily Angelica Smith MD         PhysicalExamination:  Vitals:   Vitals:    04/14/20 0400 04/14/20 0451 04/14/20 0735 04/14/20 1122   BP:  128/78 126/79 118/70   Pulse: 85 89 87 85   Resp:  (!) 24 20 20   Temp:  98 9 °F (37 2 °C) 97 7 °F (36 5 °C) 97 7 °F (36 5 °C)   TempSrc:       SpO2: 93% 93% (!) 88% 96%   Weight:       Height:         Temp  Min: 97 7 °F (36 5 °C)  Max: 99 4 °F (37 4 °C)  IBW: 68 4 kg    SpO2: 96 %,   SpO2 Activity: At Rest,   O2 Device: Nasal cannula(Mid Flow)    General: alert, mild respiratory distress  HEENT: PERRL, no icteric sclera or cyanosis, no thrush  Neck:  Supple, no lymphadenopathy or thyromegaly, no JVD  Lungs:  Equal breath sounds and clear auscultations bilaterally, no wheezing or crackles  Heart: S1S2 regular, no murmures or gallops  Abdomen: soft, non-tender, bowel sounds  present  Extrimities: no edema, no clubbing or cyanosis  Neuro: Alert and oriented x 3, no focal neurodeficits   Skin: intact, no rashes    Diagnostic Data:  CBC:   Results from last 7 days   Lab Units 04/14/20  0503 04/13/20  2356 04/13/20  1746   WBC Thousand/uL 8 79  --  11 66*   HEMOGLOBIN g/dL 14 1  --  14 8   HEMATOCRIT % 43 6  --  44 8   PLATELETS Thousands/uL 349 366 333       CMP:   Results from last 7 days   Lab Units 04/14/20  0503 04/13/20  1746   POTASSIUM mmol/L 4 0 3 6   CHLORIDE mmol/L 107 101   CO2 mmol/L 25 25   BUN mg/dL 14 12   CREATININE mg/dL 0 63 0 70   CALCIUM mg/dL 8 7 8 9   ALK PHOS U/L 53 58   ALT U/L 117* 104*   AST U/L 107* 72*     PT/INR:   Lab Results   Component Value Date    INR 1 21 (H) 04/14/2020    INR 1 21 (H) 04/13/2020   ,     Microbiology:  Results from last 7 days   Lab Units 04/13/20  1746 04/13/20  1733   BLOOD CULTURE  Received in Microbiology Lab  Culture in Progress  Received in Microbiology Lab  Culture in Progress          ABG: No results found for: PHART, JFT2AEW, PO2ART, SBT1STP, X1YMGUBO, BEART, SOURCE    Imaging:  Chest x-ray reviewed on PACs:  Bilateral patchy alveolar infiltrates    Cardiac lab/EKG/telemetry/ECHO:   Results from last 7 days   Lab Units 04/14/20  0503 04/13/20  1746   TROPONIN I ng/mL <0 02 <0 02           EKG:  Normal sinus rhythm, no acute ST/T-wave abnormalities, QTc 448     Code Status: Level 1 - Full Code    Javier Tinoco MD    Portions of the record may have been created with voice recognition software  Occasional wrong word or "sound a like" substitutions may have occurred due to the inherent limitations of voice recognition software  Read the chart carefully and recognize, using context, where substitutions have occurred

## 2020-04-14 NOTE — ED ATTENDING ATTESTATION
4/13/2020  ILaura MD, saw and evaluated the patient  I have discussed the patient with the resident and agree with the resident's findings, Plan of Care, and MDM as documented in the resident's note, unless otherwise documented below  All available laboratory and imaging studies were reviewed by myself  I was present for key portions of any procedure(s) performed by the resident and I was immediately available to provide assistance  I agree with the current assessment done in the Emergency Department  I have conducted an independent evaluation of this patient  44 y o  male with no significant past medical history, with recent diagnosis of Covid-19 on 04/06/2020 presenting with worsening shortness of breath  Patient developed symptoms of fevers, chills, body aches, and cough 10 days ago  Over the past 2 days, he has been getting worsening shortness of breath, particularly with exertion  He has not had any chest pain  He has had no nausea, vomiting, diarrhea, or abdominal pain  He has no complaints other than the shortness of breath  He is concerned that he may have infected his wife as well as she is starting to develop similar symptoms  He has no prior respiratory diseases such as asthma, COPD  He is a never smoker  Physical Exam  Vitals:    04/13/20 1930 04/13/20 2000 04/13/20 2100 04/13/20 2156   BP: 121/73 127/79 130/77 140/83   TempSrc:   Oral    Pulse: 86 92 96 98   Resp: 20 20 (!) 32 20   Patient Position - Orthostatic VS: Lying Lying     Temp:         Constitutional:  Awake, alert, oriented  Mildly tachypneic but speaking in full sentences  No acute distress  HEENT:  Normocephalic, atraumatic  Sclera anicteric, conjunctiva not injected  Moist oral mucosa  Cardiac:  Regular rate and rhythm, no murmurs, rubs, or gallops  2+ radial pulses    Respiratory:  Mildly tachypneic, on 2 L O2 via nasal cannula with O2 sat of 93%, lungs are with crackles best heard in left posterior lower and mid lung fields and right posterior lower lung field  Abdomen:  Nondistended  Bowel sounds present  No tenderness to palpation  No rebound or guarding  Extremities:  No deformities, no edema  Integument:  No rashes or exposed areas, cap refill less than 2 seconds  Neurologic:  Awake, alert, and oriented x3    Nonfocal exam   Psychiatric:  Normal affect    Labs  Labs Reviewed   CBC AND DIFFERENTIAL - Abnormal       Result Value Ref Range Status    WBC 11 66 (*) 4 31 - 10 16 Thousand/uL Final    RBC 5 22  3 88 - 5 62 Million/uL Final    Hemoglobin 14 8  12 0 - 17 0 g/dL Final    Hematocrit 44 8  36 5 - 49 3 % Final    MCV 86  82 - 98 fL Final    MCH 28 4  26 8 - 34 3 pg Final    MCHC 33 0  31 4 - 37 4 g/dL Final    RDW 12 4  11 6 - 15 1 % Final    MPV 10 3  8 9 - 12 7 fL Final    Platelets 078  708 - 390 Thousands/uL Final    nRBC 0  /100 WBCs Final    Neutrophils Relative 89 (*) 43 - 75 % Final    Immat GRANS % 1  0 - 2 % Final    Lymphocytes Relative 7 (*) 14 - 44 % Final    Monocytes Relative 3 (*) 4 - 12 % Final    Eosinophils Relative 0  0 - 6 % Final    Basophils Relative 0  0 - 1 % Final    Neutrophils Absolute 10 41 (*) 1 85 - 7 62 Thousands/µL Final    Immature Grans Absolute 0 06  0 00 - 0 20 Thousand/uL Final    Lymphocytes Absolute 0 81  0 60 - 4 47 Thousands/µL Final    Monocytes Absolute 0 37  0 17 - 1 22 Thousand/µL Final    Eosinophils Absolute 0 00  0 00 - 0 61 Thousand/µL Final    Basophils Absolute 0 01  0 00 - 0 10 Thousands/µL Final   COMPREHENSIVE METABOLIC PANEL - Abnormal    Sodium 134 (*) 136 - 145 mmol/L Final    Potassium 3 6  3 5 - 5 3 mmol/L Final    Chloride 101  100 - 108 mmol/L Final    CO2 25  21 - 32 mmol/L Final    ANION GAP 8  4 - 13 mmol/L Final    BUN 12  5 - 25 mg/dL Final    Creatinine 0 70  0 60 - 1 30 mg/dL Final    Comment: Standardized to IDMS reference method    Glucose 115  65 - 140 mg/dL Final    Comment:   If the patient is fasting, the ADA then defines impaired fasting glucose as > 100 mg/dL and diabetes as > or equal to 123 mg/dL  Specimen collection should occur prior to Sulfasalazine administration due to the potential for falsely depressed results  Specimen collection should occur prior to Sulfapyridine administration due to the potential for falsely elevated results  Calcium 8 9  8 3 - 10 1 mg/dL Final    AST 72 (*) 5 - 45 U/L Final    Comment:   Specimen collection should occur prior to Sulfasalazine administration due to the potential for falsely depressed results   (*) 12 - 78 U/L Final    Comment:   Specimen collection should occur prior to Sulfasalazine and/or Sulfapyridine administration due to the potential for falsely depressed results  Alkaline Phosphatase 58  46 - 116 U/L Final    Total Protein 7 7  6 4 - 8 2 g/dL Final    Albumin 2 5 (*) 3 5 - 5 0 g/dL Final    Total Bilirubin 0 47  0 20 - 1 00 mg/dL Final    Comment:   Use of this assay is not recommended for patients undergoing treatment with eltrombopag due to the potential for falsely elevated results      eGFR 109  ml/min/1 73sq m Final    Narrative:     [place] guidelines for Chronic Kidney Disease (CKD):     Stage 1 with normal or high GFR (GFR > 90 mL/min/1 73 square meters)    Stage 2 Mild CKD (GFR = 60-89 mL/min/1 73 square meters)    Stage 3A Moderate CKD (GFR = 45-59 mL/min/1 73 square meters)    Stage 3B Moderate CKD (GFR = 30-44 mL/min/1 73 square meters)    Stage 4 Severe CKD (GFR = 15-29 mL/min/1 73 square meters)    Stage 5 End Stage CKD (GFR <15 mL/min/1 73 square meters)  Note: GFR calculation is accurate only with a steady state creatinine   PROCALCITONIN TEST - Abnormal    Procalcitonin 0 56 (*) <=0 25 ng/ml Final    Comment: Suspected Lower Respiratory Tract Infection (LRTI):  - LESS than or EQUAL to 0 25 ng/mL:   low likelihood for bacterial LRTI; antibiotics DISCOURAGED   - GREATER than 0 25 ng/mL:   increased likelihood for bacterial LRTI; antibiotics ENCOURAGED  Suspected Sepsis:  - Strongly consider initiating antibiotics in ALL UNSTABLE patients  - LESS than or EQUAL to 0 5 ng/mL:   low likelihood for bacterial sepsis; antibiotics DISCOURAGED   - GREATER than 0 5 ng/mL:   increased likelihood for bacterial sepsis; antibiotics ENCOURAGED   - GREATER than 2 ng/mL:   high risk for severe sepsis / septic shock; antibiotics strongly ENCOURAGED  Decisions on antibiotic use should not be based solely on Procalcitonin (PCT) levels  If PCT is low but uncertainty exists with stopping antibiotics, repeat PCT in 6-24 hours to confirm the low level  If antibiotics are administered (regardless if initial PCT was high or low), repeat PCT every 1-2 days to consider early antibiotic cessation (when GREATER than 80% decrease from the peak OR when PCT drops below designated cutoffs, whichever comes first), so long as the infection is NOT one that typically requires prolonged treatment durations (e g , bone/joint infections, endocarditis, Staph  aureus bacteremia)      Situations of FALSE-POSITIVE Procalcitonin values:  1) Newborns < 67 hours old  2) Massive stress from severe trauma / burns, major surgery, acute pancreatitis, cardiogenic / hemorrhagic shock, sickle cell crisis, or other organ perfusion abnormalities  3) Malaria and some Candidal infections  4) Treatment with agents that stimulate cytokines (e g , OKT3, anti-lymphocyte globulins, alemtuzumab, IL-2, granulocyte transfusion [NOT GCSFs])  5) Chronic renal disease causes elevated baseline levels (consider GREATER than 0 75 ng/mL as an abnormal cut-off); initiating HD/CRRT may cause transient decreases  6) Paraneoplastic syndromes from medullary thyroid or SCLC, some forms of vasculitis, and acute [id number]-wh-trra disease    Situations of FALSE-NEGATIVE Procalcitonin values:  1) Too early in clinical course for PCT to have reached its peak (may repeat in 6-24 hours to confirm low level)  2) Localized infection WITHOUT systemic (SIRS / sepsis) response (e g , an abscess, osteomyelitis, cystitis)  3) Mycobacteria (e g , Tuberculosis, MAC)  4) Cystic fibrosis exacerbations     PROTIME-INR - Abnormal    Protime 14 9 (*) 11 6 - 14 5 seconds Final    INR 1 21 (*) 0 84 - 1 19 Final   C-REACTIVE PROTEIN - Abnormal     0 (*) <3 0 mg/L Final   FERRITIN - Abnormal    Ferritin 2,650 (*) 8 - 388 ng/mL Final   D-DIMER, QUANTITATIVE - Abnormal    D-Dimer, Quant 1 07 (*) <0 50 ug/ml FEU Final    Comment:   Reference and upper limits to exclude DVT and PE are the same  Do not use to exclude if clinical symptoms are present  LDH - Abnormal     (*) 81 - 234 U/L Final   CK - Abnormal    Total  (*) 39 - 308 U/L Final   BLOOD GAS, VENOUS - Abnormal    pH, [place] 7 422 (*) 7 300 - 7 400 Final    pCO2, [place] 41 2 (*) 42 0 - 50 0 mm Hg Final    pO2, [place] 38 6  35 0 - 45 0 mm Hg Final    HCO3, [place] 26 2  24 - 30 mmol/L Final    Base Excess, [place] 1 6  mmol/L Final    O2 Content, [place] 16 2  ml/dL Final    O2 HGB, VENOUS 72 9  60 0 - 80 0 % Final   LACTIC ACID, PLASMA - Normal    LACTIC ACID 1 6  0 5 - 2 0 mmol/L Final    Narrative:     Result may be elevated if tourniquet was used during collection  APTT - Normal    PTT 32  23 - 37 seconds Final    Comment: Therapeutic Heparin Range =  60-90 seconds   TROPONIN I - Normal    Troponin I <0 02  <=0 04 ng/mL Final    Comment:   [employer] Chemistry analyzer 99% cutoff is > 0 04 ng/mL in network labs     o cTnI 99% cutoff is useful only when applied to patients in the clinical setting of myocardial ischemia   o cTnI 99% cutoff should be interpreted in the context of clinical history, ECG findings and possibly cardiac imaging to establish correct diagnosis  o cTnI 99% cutoff may be suggestive but clearly not indicative of a coronary event without the clinical setting of myocardial ischemia       NT-BNP PRO (BRAIN NATRIURETIC PEPTIDE) - Normal    NT-proBNP 108 <125 pg/mL Final   CKMB - Normal    CK-MB Index <1 0  0 0 - 2 5 % Final    CK-MB 1 8  0 0 - 5 0 ng/mL Final   VITAMIN D 25 HYDROXY   INTERLEUKIN-6, PLASMA   URINE MACROSCOPIC, POC    Color, UA     Corrected    Comment: Per Aracelis Caldwell Macro done on wrong pt  This is a corrected result  Previous result was Yellow on 4/13/2020 at 1747 EDT    Clarity, UA     Corrected    Comment: This is a corrected result  Previous result was Clear on 4/13/2020 at 1747 EDT    pH, UA     Corrected    Comment: This is a corrected result  Previous result was 5 0 on 4/13/2020 at 1747 EDT    Leukocytes, UA     Corrected    Comment: This is a corrected result  Previous result was Small on 4/13/2020 at 1747 EDT    Nitrite, UA     Corrected    Comment: This is a corrected result  Previous result was Negative on 4/13/2020 at 1747 EDT    Protein, UA     Corrected    Comment: This is a corrected result  Previous result was 100 (2+) mg/dl on 4/13/2020 at 1747 EDT    Glucose, UA     Corrected    Comment: This is a corrected result  Previous result was Negative mg/dl on 4/13/2020 at 1747 EDT    Ketones, UA     Corrected    Comment: This is a corrected result  Previous result was Trace mg/dl on 4/13/2020 at 1747 EDT    Urobilinogen, UA     Corrected    Comment: This is a corrected result  Previous result was 1 0 E U /dl on 4/13/2020 at 1747 EDT    Bilirubin, UA     Corrected    Comment: The dipstick result may be falsely positive due to interfering substances  We recommend reliance upon serum bilirubin, liver & kidney function tests to guide patient care if clinically indicated  This is a corrected result  Previous result was Interference- unable to analyze on 4/13/2020 at 1747 EDT    Blood, UA     Corrected    Comment: This is a corrected result  Previous result was Trace on 4/13/2020 at 1747 EDT    Specific [place], UA     Corrected    Comment: This is a corrected result   Previous result was >=1 030 on 4/13/2020 at 1747 EDT    Narrative: CLINITEK RESULT       Tests  XR chest 1 view portable   Final Result      Patchy bilateral airspace disease could be indicative of multifocal pneumonia including viral etiologies  Workstation performed: [id number]             Procedures  ECG 12 Lead Documentation Only  Date/Time: 4/13/2020 5:09 PM  Performed by: Matt Green MD  Authorized by: Matt Green MD     Comments:      Normal sinus rhythm, ventricular rate 93, SC interval 176, QRS 88, , normal axis, tremors baseline makes ST segment analysis less reliable, however, there are no gross ST/T-wave changes to suggest ischemia or to point to acute pericarditis (except perhaps isolated lead II)  No STEMI  ED Course  Medications   ascorbic acid (VITAMIN C) tablet 1,000 mg (1,000 mg Oral Given 4/13/20 2237)   zinc sulfate (ZINCATE) capsule 220 mg (has no administration in time range)   atorvastatin (LIPITOR) tablet 40 mg (40 mg Oral Given 4/13/20 2237)   heparin (porcine) subcutaneous injection 5,000 Units (5,000 Units Subcutaneous Given 4/13/20 2239)   azithromycin (ZITHROMAX) tablet 250 mg (has no administration in time range)   cefTRIAXone (ROCEPHIN) 1,000 mg in dextrose 5 % 50 mL IVPB (has no administration in time range)   hydroxychloroquine (PLAQUENIL) tablet 200 mg (has no administration in time range)   hydroxychloroquine (PLAQUENIL) tablet 400 mg (has no administration in time range)   methylPREDNISolone sodium succinate (Solu-MEDROL) injection 200 mg (has no administration in time range)   ceftriaxone (ROCEPHIN) 1 g/50 mL in dextrose IVPB (0 mg Intravenous Stopped 4/13/20 2032)   azithromycin (ZITHROMAX) tablet 500 mg (500 mg Oral Given 4/13/20 238)     18-year-old male presenting with worsening shortness of breath in setting of Covid-19    Vital signs reviewed, afebrile, not hypertensive, O2 sat decreased down to 79% with ambulation, improved with supplemental O2 via nasal cannula initially at 2 liters/minute and subsequently to 4 liters/minute  Differential diagnosis includes viral pneumonia in setting of Covid-19, with other complications such as perimyocarditis, PE, considered  Basic labs reveal CMP with mild transaminitis without total bilirubin elevation, troponin x1 is negative, CRP is markedly elevated, ferritin is elevated at 2650, lactate is within normal limits, CBC is with neutrophil predominance and without evidence of lymphopenia, coags prolonged and fibrinogen is elevated  Chest x-ray to my review is with patchy bilateral infiltrates concerning for multifocal pneumonia  Patient is requiring more oxygen at baseline to keep up with hypoxia, patient placed on middle flow oxygen  His VBG pH is 7 42 with no evidence of hypercarbia  No indication for intubation at this time  Patient will be admitted to the hospital for further oxygen support  Patient falls in the moderate severity group of Covid-19 and is thus receiving hydroxychloroquine, azithromycin, ceftriaxone, and methylprednisolone      Clinical Impression  Final diagnoses:   [id number] virus infection   Acute respiratory distress   CRP elevated   Elevated ferritin   Elevated procalcitonin

## 2020-04-14 NOTE — ASSESSMENT & PLAN NOTE
- Pt is COVID positive since 04/06  - Symptomatic since 04/02 with fever, tested positive on 04/06, seen in respiratory clinic on 04/10 O2 sat >90%  - Now presenting with 1 day worsening SOB and fever of 103  - ED: tachypneic and hypoxic < 90%, placed on 8L NC to maintain O2 sats, afebrile at admission  - Lung exam: diffuse rales throughout and decreased breath sounds throughout but more pronounced in the bases  - Labs: VBG w/ pCO2 41, pH 7 4; CMP w/ Na 134, K 3 6, AST elevated at 72 and ALT elevated at 104, Albumin decreased at 2 5; ; ; trop and pro-BNP normal; Ferritin 2,650; lactic acid nl; CBC with WBC 11 66, H&H wnl, % neutros 89%; ANC 10 41 number; PT/INR 14 9/1 21; ; D-dimer elevated at 1 07; Procal 0 56  - Vitamin D level ordered; if below 50, will order D3 2000 Iu qD  - CXR:  Patchy infiltrates bilaterally, hazy on wet read  - EKG qtc 423  - COVID moderate pathway: started on Rocephin 1g, Azithromycin 500mg, Plaquenil 400mg, Solumedrol 2mg/kg    * Plan: will continue to monitor O2 status; high alert for worsening hypoxia and SOB w/ increased O2 need; if patient requires 10L NC will consult CCM and ID  * Repeat labs ordered for the morning  * Continue Rocephin, Azithro and Plaquenil at current dosing unless patient decompensates then switch to severe pathway

## 2020-04-14 NOTE — NURSING NOTE
Patient was prone for 8 out of 12 hours today  Had 3 episodes of continent liquid brown stool early this morning  Decreased appetite  Improved with the incentive spirometer from 50 this AM to 300 at 1700

## 2020-04-14 NOTE — ASSESSMENT & PLAN NOTE
- requiring midflow 15L, 2/2 above  -Continue IS, educated on self proning   -limit activity, encourage bedside commode/bedpan use

## 2020-04-15 LAB
ALBUMIN SERPL BCP-MCNC: 2.3 G/DL (ref 3.5–5)
ALP SERPL-CCNC: 56 U/L (ref 46–116)
ALT SERPL W P-5'-P-CCNC: 230 U/L (ref 12–78)
ANION GAP SERPL CALCULATED.3IONS-SCNC: 8 MMOL/L (ref 4–13)
AST SERPL W P-5'-P-CCNC: 149 U/L (ref 5–45)
BILIRUB SERPL-MCNC: 0.31 MG/DL (ref 0.2–1)
BUN SERPL-MCNC: 21 MG/DL (ref 5–25)
C DIFF TOX GENS STL QL NAA+PROBE: NEGATIVE
CALCIUM SERPL-MCNC: 9.1 MG/DL (ref 8.3–10.1)
CHLORIDE SERPL-SCNC: 107 MMOL/L (ref 100–108)
CO2 SERPL-SCNC: 23 MMOL/L (ref 21–32)
CREAT SERPL-MCNC: 0.76 MG/DL (ref 0.6–1.3)
CRP SERPL QL: 96.2 MG/L
ERYTHROCYTE [DISTWIDTH] IN BLOOD BY AUTOMATED COUNT: 12.5 % (ref 11.6–15.1)
FERRITIN SERPL-MCNC: 4564 NG/ML (ref 8–388)
G6PD BLD QN: 202 U/10E12 RBC (ref 146–376)
GFR SERPL CREATININE-BSD FRML MDRD: 106 ML/MIN/1.73SQ M
GLUCOSE SERPL-MCNC: 134 MG/DL (ref 65–140)
HCT VFR BLD AUTO: 44.1 % (ref 36.5–49.3)
HGB BLD-MCNC: 14.3 G/DL (ref 12–17)
MCH RBC QN AUTO: 28.4 PG (ref 26.8–34.3)
MCHC RBC AUTO-ENTMCNC: 32.4 G/DL (ref 31.4–37.4)
MCV RBC AUTO: 88 FL (ref 82–98)
NRBC BLD AUTO-RTO: 0 /100 WBCS
PLATELET # BLD AUTO: 465 THOUSANDS/UL (ref 149–390)
PMV BLD AUTO: 10.4 FL (ref 8.9–12.7)
POTASSIUM SERPL-SCNC: 3.3 MMOL/L (ref 3.5–5.3)
PROCALCITONIN SERPL-MCNC: 0.23 NG/ML
PROT SERPL-MCNC: 7.1 G/DL (ref 6.4–8.2)
RBC # BLD AUTO: 5.04 MILLION/UL (ref 3.88–5.62)
RBC # BLD AUTO: 5.18 X10E6/UL (ref 4.14–5.8)
SODIUM SERPL-SCNC: 138 MMOL/L (ref 136–145)
WBC # BLD AUTO: 14.17 THOUSAND/UL (ref 4.31–10.16)

## 2020-04-15 PROCEDURE — 86140 C-REACTIVE PROTEIN: CPT | Performed by: INTERNAL MEDICINE

## 2020-04-15 PROCEDURE — 94760 N-INVAS EAR/PLS OXIMETRY 1: CPT

## 2020-04-15 PROCEDURE — 85027 COMPLETE CBC AUTOMATED: CPT | Performed by: INTERNAL MEDICINE

## 2020-04-15 PROCEDURE — 99232 SBSQ HOSP IP/OBS MODERATE 35: CPT | Performed by: INTERNAL MEDICINE

## 2020-04-15 PROCEDURE — 80053 COMPREHEN METABOLIC PANEL: CPT | Performed by: INTERNAL MEDICINE

## 2020-04-15 PROCEDURE — 99233 SBSQ HOSP IP/OBS HIGH 50: CPT | Performed by: INTERNAL MEDICINE

## 2020-04-15 PROCEDURE — 99232 SBSQ HOSP IP/OBS MODERATE 35: CPT | Performed by: PHYSICIAN ASSISTANT

## 2020-04-15 PROCEDURE — 82728 ASSAY OF FERRITIN: CPT | Performed by: INTERNAL MEDICINE

## 2020-04-15 PROCEDURE — 84145 PROCALCITONIN (PCT): CPT | Performed by: INTERNAL MEDICINE

## 2020-04-15 RX ORDER — LOPERAMIDE HYDROCHLORIDE 2 MG/1
2 CAPSULE ORAL 3 TIMES DAILY PRN
Status: DISCONTINUED | OUTPATIENT
Start: 2020-04-15 | End: 2020-04-23 | Stop reason: HOSPADM

## 2020-04-15 RX ORDER — POTASSIUM CHLORIDE 20 MEQ/1
20 TABLET, EXTENDED RELEASE ORAL 2 TIMES DAILY
Status: COMPLETED | OUTPATIENT
Start: 2020-04-15 | End: 2020-04-16

## 2020-04-15 RX ADMIN — METHYLPREDNISOLONE SODIUM SUCCINATE 50 MG: 125 INJECTION, POWDER, FOR SOLUTION INTRAMUSCULAR; INTRAVENOUS at 08:27

## 2020-04-15 RX ADMIN — OXYCODONE HYDROCHLORIDE AND ACETAMINOPHEN 1000 MG: 500 TABLET ORAL at 08:27

## 2020-04-15 RX ADMIN — AZITHROMYCIN 250 MG: 250 TABLET, FILM COATED ORAL at 08:27

## 2020-04-15 RX ADMIN — HYDROXYCHLOROQUINE SULFATE 200 MG: 200 TABLET, FILM COATED ORAL at 17:32

## 2020-04-15 RX ADMIN — ATORVASTATIN CALCIUM 40 MG: 40 TABLET, FILM COATED ORAL at 21:08

## 2020-04-15 RX ADMIN — POTASSIUM CHLORIDE 20 MEQ: 1500 TABLET, EXTENDED RELEASE ORAL at 17:32

## 2020-04-15 RX ADMIN — OXYCODONE HYDROCHLORIDE AND ACETAMINOPHEN 1000 MG: 500 TABLET ORAL at 17:31

## 2020-04-15 RX ADMIN — ZINC SULFATE 220 MG (50 MG) CAPSULE 220 MG: CAPSULE at 08:27

## 2020-04-15 RX ADMIN — MELATONIN 2000 UNITS: at 08:27

## 2020-04-15 RX ADMIN — METHYLPREDNISOLONE SODIUM SUCCINATE 50 MG: 125 INJECTION, POWDER, FOR SOLUTION INTRAMUSCULAR; INTRAVENOUS at 20:59

## 2020-04-15 RX ADMIN — Medication 250 MG: at 08:27

## 2020-04-15 RX ADMIN — CEFTRIAXONE SODIUM 1000 MG: 1 INJECTION, POWDER, FOR SOLUTION INTRAMUSCULAR; INTRAVENOUS at 18:02

## 2020-04-15 RX ADMIN — Medication 250 MG: at 17:32

## 2020-04-15 RX ADMIN — ENOXAPARIN SODIUM 40 MG: 40 INJECTION SUBCUTANEOUS at 08:27

## 2020-04-15 RX ADMIN — LOPERAMIDE HYDROCHLORIDE 2 MG: 2 CAPSULE ORAL at 10:30

## 2020-04-15 RX ADMIN — HYDROXYCHLOROQUINE SULFATE 200 MG: 200 TABLET, FILM COATED ORAL at 06:01

## 2020-04-15 RX ADMIN — POTASSIUM CHLORIDE 20 MEQ: 1500 TABLET, EXTENDED RELEASE ORAL at 10:30

## 2020-04-15 NOTE — PLAN OF CARE
Problem: PAIN - ADULT  Goal: Verbalizes/displays adequate comfort level or baseline comfort level  Description  Interventions:  - Encourage patient to monitor pain and request assistance  - Assess pain using appropriate pain scale  - Administer analgesics based on type and severity of pain and evaluate response  - Implement non-pharmacological measures as appropriate and evaluate response  - Consider cultural and social influences on pain and pain management  - Notify physician/advanced practitioner if interventions unsuccessful or patient reports new pain  Outcome: Progressing     Problem: INFECTION - ADULT  Goal: Absence or prevention of progression during hospitalization  Description  INTERVENTIONS:  - Assess and monitor for signs and symptoms of infection  - Monitor lab/diagnostic results  - Monitor all insertion sites, i e  indwelling lines, tubes, and drains  - Monitor endotracheal if appropriate and nasal secretions for changes in amount and color  - [place] appropriate cooling/warming therapies per order  - Administer medications as ordered  - Instruct and encourage patient and family to use good hand hygiene technique  - Identify and instruct in appropriate isolation precautions for identified infection/condition  Outcome: Progressing  Goal: Absence of fever/infection during neutropenic period  Description  INTERVENTIONS:  - Monitor WBC    Outcome: Progressing     Problem: SAFETY ADULT  Goal: Patient will remain free of falls  Description  INTERVENTIONS:  - Assess patient frequently for physical needs  -  Identify cognitive and physical deficits and behaviors that affect risk of falls    -  [place] fall precautions as indicated by assessment   - Educate patient/family on patient safety including physical limitations  - Instruct patient to call for assistance with activity based on assessment  - Modify environment to reduce risk of injury  - Consider OT/PT consult to assist with strengthening/mobility  Outcome: Progressing  Goal: Maintain or return to baseline ADL function  Description  INTERVENTIONS:  -  Assess patient's ability to carry out ADLs; assess patient's baseline for ADL function and identify physical deficits which impact ability to perform ADLs (bathing, care of mouth/teeth, toileting, grooming, dressing, etc )  - Assess/evaluate cause of self-care deficits   - Assess range of motion  - Assess patient's mobility; develop plan if impaired  - Assess patient's need for assistive devices and provide as appropriate  - Encourage maximum independence but intervene and supervise when necessary  - Involve family in performance of ADLs  - Assess for home care needs following discharge   - Consider OT consult to assist with ADL evaluation and planning for discharge  - Provide patient education as appropriate  Outcome: Progressing  Goal: Maintain or return mobility status to optimal level  Description  INTERVENTIONS:  - Assess patient's baseline mobility status (ambulation, transfers, stairs, etc )    - Identify cognitive and physical deficits and behaviors that affect mobility  - Identify mobility aids required to assist with transfers and/or ambulation (gait belt, sit-to-stand, lift, walker, cane, etc )  - [place] fall precautions as indicated by assessment  - Record patient progress and toleration of activity level on Mobility SBAR; progress patient to next Phase/Stage  - Instruct patient to call for assistance with activity based on assessment  - Consider rehabilitation consult to assist with strengthening/weightbearing, etc   Outcome: Progressing     Problem: DISCHARGE PLANNING  Goal: Discharge to home or other facility with appropriate resources  Description  INTERVENTIONS:  - Identify barriers to discharge w/patient and caregiver  - Arrange for needed discharge resources and transportation as appropriate  - Identify discharge learning needs (meds, wound care, etc )  - Arrange for interpretive services to assist at discharge as needed  - Refer to Case Management Department for coordinating discharge planning if the patient needs post-hospital services based on physician/advanced practitioner order or complex needs related to functional status, cognitive ability, or social support system  Outcome: Progressing     Problem: Knowledge Deficit  Goal: Patient/family/caregiver demonstrates understanding of disease process, treatment plan, medications, and discharge instructions  Description  Complete learning assessment and assess knowledge base  Interventions:  - Provide teaching at level of understanding  - Provide teaching via preferred learning methods  Outcome: Progressing     Problem: DISCHARGE PLANNING - CARE MANAGEMENT  Goal: Discharge to post-acute care or home with appropriate resources  Description  INTERVENTIONS:  - Conduct assessment to determine patient/family and health care team treatment goals, and need for post-acute services based on payer coverage, community resources, and patient preferences, and barriers to discharge  - Address psychosocial, clinical, and financial barriers to discharge as identified in assessment in conjunction with the patient/family and health care team  - Arrange appropriate level of post-acute services according to patients   needs and preference and payer coverage in collaboration with the physician and health care team  - Communicate with and update the patient/family, physician, and health care team regarding progress on the discharge plan  - Arrange appropriate transportation to post-acute venues  Outcome: Progressing     Problem: Nutrition/Hydration-ADULT  Goal: Nutrient/Hydration intake appropriate for improving, restoring or maintaining nutritional needs  Description  Monitor and assess patient's nutrition/hydration status for malnutrition  Collaborate with interdisciplinary team and initiate plan and interventions as ordered    Monitor patient's weight and dietary intake as ordered or per policy  Utilize nutrition screening tool and intervene as necessary  Determine patient's food preferences and provide high-protein, high-caloric foods as appropriate       INTERVENTIONS:  - Monitor oral intake, urinary output, labs, and treatment plans  - Assess nutrition and hydration status and recommend course of action  - Evaluate amount of meals eaten  - Assist patient with eating if necessary   - Allow adequate time for meals  - Recommend/ encourage appropriate diets, oral nutritional supplements, and vitamin/mineral supplements  - Order, calculate, and assess calorie counts as needed  - Recommend, monitor, and adjust tube feedings and TPN/PPN based on assessed needs  - Assess need for intravenous fluids  - Provide specific nutrition/hydration education as appropriate  - Include patient/family/caregiver in decisions related to nutrition  Outcome: Progressing     Problem: RESPIRATORY - ADULT  Goal: Achieves optimal ventilation and oxygenation  Description  INTERVENTIONS:  - Assess for changes in respiratory status  - Assess for changes in mentation and behavior  - Position to facilitate oxygenation and minimize respiratory effort  - Oxygen administered by appropriate delivery if ordered  - Initiate smoking cessation education as indicated  - Encourage broncho-pulmonary hygiene including cough, deep breathe, Incentive Spirometry  - Assess the need for suctioning and aspirate as needed  - Assess and instruct to report SOB or any respiratory difficulty  - Respiratory Therapy support as indicated  Outcome: Progressing     Problem: METABOLIC, FLUID AND ELECTROLYTES - ADULT  Goal: Electrolytes maintained within normal limits  Description  INTERVENTIONS:  - Monitor labs and assess patient for signs and symptoms of electrolyte imbalances  - Administer electrolyte replacement as ordered  - Monitor response to electrolyte replacements, including repeat lab results as appropriate  - Instruct patient on fluid and nutrition as appropriate  Outcome: Progressing  Goal: Fluid balance maintained  Description  INTERVENTIONS:  - Monitor labs   - Monitor I/O and WT  - Instruct patient on fluid and nutrition as appropriate  - Assess for signs & symptoms of volume excess or deficit  Outcome: Progressing  Goal: Glucose maintained within target range  Description  INTERVENTIONS:  - Monitor Blood Glucose as ordered  - Assess for signs and symptoms of hyperglycemia and hypoglycemia  - Administer ordered medications to maintain glucose within target range  - Assess nutritional intake and initiate nutrition service referral as needed  Outcome: Progressing     Problem: Potential for Falls  Goal: Patient will remain free of falls  Description  INTERVENTIONS:  - Assess patient frequently for physical needs  -  Identify cognitive and physical deficits and behaviors that affect risk of falls    -  [place] fall precautions as indicated by assessment   - Educate patient/family on patient safety including physical limitations  - Instruct patient to call for assistance with activity based on assessment  - Modify environment to reduce risk of injury  - Consider OT/PT consult to assist with strengthening/mobility  Outcome: Progressing

## 2020-04-15 NOTE — PROGRESS NOTES
Progress Note - Infectious Disease   Marcello Median Carina 44 y o  male MRN: [id number]  Unit/Bed#: CW2 214-01 Encounter: [id number]      Impression/Recommendations:  1  Severe COVID multifocal pneumonia  Unclear exposure history  Complicated by # 2  Procalcitonin 0 56 >> 0 23, ferritin 2650 >> 4594,  >> 96 2, D-dimer 1 29  Clinically stable without sepsis  Rec:  ? Continue azithromycin for now   ? D/C ceftriaxone after tonight's dose  ? Continue hydroxychloroquine, steroids   ? Continue vitamin-C, zinc, vitamin-D, atorvastatin  ? Follow-up [place] 6, G6PD levels  ? Check daily ECG  ? Recheck CBC, CRP, Ferritin, procalcitonin in AM     2  Acute hypoxic respiratory failure  Due to # 1  O2 requirement slightly improved  Rec:  ? Continue antibiotics and COVID management as above  ? Follow respiratory status closely     3  Acute transaminitis  Likely due to # 1  Denies abdominal pain  LFTs slightly worse  Rec:  ? Recheck CMP in a m   ? Serial abdominal exam     4   Diarrhea  Suspect due to # 1  C diff negative      5   MO     The above impression and plan was discussed in detail with the patient and Dr Yoan Fairbanks      Antibiotics:  Azithromycin # 3  Ceftriaxone # 3  Hydroxychloroquine # 3  Steroids # 2  Vitamin-C/zinc/atorvastatin # 2    Subjective:  Patient seen on AM rounds  Feels a little better today  Continues to have dyspnea and hypoxia when getting out of bed to commode  Continues to have diarrhea  No fevers, chills, sweats, nausea, or vomiting    24 Hour Events:  No documented fevers, chills, sweats, nausea, vomiting  O2 down to 10L      Objective:  Vitals:  Temp:  [97 2 °F (36 2 °C)-98 2 °F (36 8 °C)] 97 2 °F (36 2 °C)  HR:  [66-95] 94  Resp:  [20] 20  BP: (117-126)/(69-82) 125/82  SpO2:  [89 %-99 %] 90 %  Temp (24hrs), Av 7 °F (36 5 °C), Min:97 2 °F (36 2 °C), Max:98 2 °F (36 8 °C)  Current: Temperature: (!) 97 2 °F (36 2 °C)    Physical Exam:   General:  No acute distress, laying prone  Eyes:  Normal lids and conjunctivae  ENT:  Normal external ears and nose  Neck:  Neck symmetric with midline trachea  Pulmonary:  Normal respiratory effort without accessory muscle use  Cardiovascular:  Regular rate and rhythm; no peripheral edema  Gastrointestinal:  No tenderness or distention  Musculoskeletal:  No digital clubbing or cyanosis  Skin:  No visible rashes; No palpable nodules  Neurologic:  Sensation grossly intact to light touch  Psychiatric:  Alert and oriented; Normal mood    Lab Results:  I have personally reviewed pertinent labs  Results from last 7 days   Lab Units 04/15/20  0611 04/14/20  0503 04/13/20  1746   POTASSIUM mmol/L 3 3* 4 0 3 6   CHLORIDE mmol/L 107 107 101   CO2 mmol/L 23 25 25   BUN mg/dL 21 14 12   CREATININE mg/dL 0 76 0 63 0 70   EGFR ml/min/1 73sq m 106 114 109   CALCIUM mg/dL 9 1 8 7 8 9   AST U/L 149* 107* 72*   ALT U/L 230* 117* 104*   ALK PHOS U/L 56 53 58     Results from last 7 days   Lab Units 04/15/20  0611 04/14/20  0503 04/13/20  2356 04/13/20  1746   WBC Thousand/uL 14 17* 8 79  --  11 66*   HEMOGLOBIN g/dL 14 3 14 1  --  14 8   PLATELETS Thousands/uL 465* 349 366 333     Results from last 7 days   Lab Units 04/14/20  1827 04/13/20  1746 04/13/20  1733   BLOOD CULTURE   --  No Growth at 24 hrs  No Growth at 24 hrs  C DIFF TOXIN B  Negative  --   --        Imaging Studies:   I have personally reviewed pertinent imaging study reports and images in PACS  EKG, Pathology, and Other Studies:   I have personally reviewed pertinent reports

## 2020-04-15 NOTE — PROGRESS NOTES
Progress Note - [address] 1970, 44 y o  male MRN: [id number]    Unit/Bed#: 2 214-01 Encounter: [id number]    Primary Care Provider: Rupert Reno MD   Date and time admitted to hospital: 4/13/2020  4:58 PM        Diarrhea  Assessment & Plan  resolved  Likely due to COVID-19  Stool for C diff was negative  Continue probiotic  Prn imodium    Vitamin D deficiency  Assessment & Plan  Continue vitamin D supplementation    Acute respiratory failure with hypoxia ([facility] [address] )  Assessment & Plan  As above    * COVID-19 virus infection  Assessment & Plan  Positive COVID-19 PCR  Severe disease due to viral pneumonia and and high oxygen requirement  With acute respiratory failure  Improved oxygenation today, continue to wean down as tolerated  Continue prone positioning  Continue ceftriaxone, azithromycin, corticosteroids, hydroxychloroquine, vitamin C, Zinc, atorvastatin, vitamin D  Monitor Procalcitonin, ferritin, CRP, CBC, temps          VTE Pharmacologic Prophylaxis:   Pharmacologic: Heparin  Mechanical VTE Prophylaxis in Place: Yes    Patient Centered Rounds: I have performed bedside rounds with nursing staff today  Discussions with Specialists or Other Care Team Provider: ID , pulmonology    Education and Discussions with Family / Patient: patient and emergency contact, plan of care    Time Spent for Care: 20 minutes  More than 50% of total time spent on counseling and coordination of care as described above  Current Length of Stay: 2 day(s)    Current Patient Status: Inpatient   Certification Statement: The patient will continue to require additional inpatient hospital stay due to wean off oxygen, continue IV steroids    Discharge Plan: home when stable    Code Status: Level 1 - Full Code      Subjective:   Reports shortness of breath is improved continues to have exertional dyspnea when getting up to use the restroom  Tolerating his diet    Denies any nausea, vomiting, abdominal pain, diarrhea  Objective:     Vitals:   Temp (24hrs), Av 7 °F (36 5 °C), Min:97 2 °F (36 2 °C), Max:98 2 °F (36 8 °C)    Temp:  [97 2 °F (36 2 °C)-98 2 °F (36 8 °C)] 97 6 °F (36 4 °C)  HR:  [81-94] 81  Resp:  [20] 20  BP: (118-127)/(66-82) 123/66  SpO2:  [86 %-99 %] 88 %  Body mass index is 33 86 kg/m²  Input and Output Summary (last 24 hours): Intake/Output Summary (Last 24 hours) at 4/15/2020 1620  Last data filed at 4/15/2020 1506  Gross per 24 hour   Intake 360 ml   Output 1170 ml   Net -810 ml       Physical Exam:     Physical Exam   Constitutional: He is oriented to person, place, and time  He appears well-developed and well-nourished  No distress  HENT:   Head: Normocephalic and atraumatic  Eyes: Pupils are equal, round, and reactive to light  EOM are normal    Neck: Neck supple  Cardiovascular: Normal rate and regular rhythm  Pulmonary/Chest: He has no wheezes  He has no rales  Abdominal: Soft  Musculoskeletal: He exhibits no edema  Neurological: He is alert and oriented to person, place, and time  Skin: Skin is warm and dry  Additional Data:     Labs:    Results from last 7 days   Lab Units 04/15/20  0611 20  0503  20  1746   WBC Thousand/uL 14 17* 8 79  --  11 66*   HEMOGLOBIN g/dL 14 3 14 1  --  14 8   HEMATOCRIT % 44 1 43 6  --  44 8   PLATELETS Thousands/uL 465* 349   < > 333   NEUTROS PCT %  --   --   --  89*   LYMPHS PCT %  --   --   --  7*   LYMPHO PCT %  --  0*  --   --    MONOS PCT %  --   --   --  3*   MONO PCT %  --  0*  --   --    EOS PCT %  --  0  --  0    < > = values in this interval not displayed       Results from last 7 days   Lab Units 04/15/20  0611   SODIUM mmol/L 138   POTASSIUM mmol/L 3 3*   CHLORIDE mmol/L 107   CO2 mmol/L 23   BUN mg/dL 21   CREATININE mg/dL 0 76   ANION GAP mmol/L 8   CALCIUM mg/dL 9 1   ALBUMIN g/dL 2 3*   TOTAL BILIRUBIN mg/dL 0 31   ALK PHOS U/L 56   ALT U/L 230*   AST U/L 149*   GLUCOSE RANDOM mg/dL 134     Results from last 7 days   Lab Units 04/14/20  0503   INR  1 21*             Results from last 7 days   Lab Units 04/15/20  0611 04/14/20  0503 04/13/20  1746   LACTIC ACID mmol/L  --   --  1 6   PROCALCITONIN ng/ml 0 23 0 40* 0 56*           * I Have Reviewed All Lab Data Listed Above  * Additional Pertinent Lab Tests Reviewed: All Labs Within Last 24 Hours Reviewed      Recent Cultures (last 7 days):     Results from last 7 days   Lab Units 04/14/20  1827 04/13/20  1746 04/13/20  1733   BLOOD CULTURE   --  No Growth at 24 hrs  No Growth at 24 hrs  C DIFF TOXIN B  Negative  --   --        Last 24 Hours Medication List:     Current Facility-Administered Medications:  ascorbic acid 1,000 mg Oral BID Anupam Burns MD    atorvastatin 40 mg Oral HS Anupam Burns MD    azithromycin 250 mg Oral Q24H Anupam Burns MD    cefTRIAXone 1,000 mg Intravenous Q24H Fallon Garza MD Last Rate: 1,000 mg (04/14/20 1828)   cholecalciferol 2,000 Units Oral Daily Jackelin Smith MD    enoxaparin 40 mg Subcutaneous Q24H Alexander Sharma MD    hydroxychloroquine 200 mg Oral Q8H Anupam Burns MD    loperamide 2 mg Oral TID PRN Jackelin Smith MD    methylPREDNISolone sodium succinate 50 mg Intravenous Q12H Delores Carlson MD    potassium chloride 20 mEq Oral BID Jackelin Smith MD    saccharomyces boulardii 250 mg Oral BID Jackelin Smith MD    zinc sulfate 220 mg Oral Daily Anupam Burns MD         Today, Patient Was Seen By: Marielena Phillips MD    ** Please Note: Dictation voice to text software may have been used in the creation of this document   **

## 2020-04-15 NOTE — ASSESSMENT & PLAN NOTE
Positive COVID-19 PCR  Severe disease due to viral pneumonia and and high oxygen requirement  With acute respiratory failure  Improved oxygenation today, continue to wean down as tolerated  Continue prone positioning  Continue ceftriaxone, azithromycin, corticosteroids, hydroxychloroquine, vitamin C, Zinc, atorvastatin, vitamin D    Monitor Procalcitonin, ferritin, CRP, CBC, temps

## 2020-04-15 NOTE — PROGRESS NOTES
Progress Note - Pulmonary   Danae Lim 44 y o  male MRN: [id number]  Unit/Bed#: CW2 214-01 Encounter: [id number]      Assessment:  1  Acute hypoxic respiratory failure secondary to COVID-19 infection  2  COVID-19 pneumonia POA with elevated inflammatory markers  3  Presumed superimposed community-acquired pneumonia  Positive leukocytosis however procalcitonin trending down  4  Diarrhea likely secondary to COVID-19 infection  C diff PCR negative          Today's Labs reviewed  Ferritin worsening 4564 today  Procalcitonin normal today down from 0 56 on 04/13/2020  CRP trending down  G6PD pending    Plan:  1  Continue to titrate supplemental oxygen to keep pulse ox greater than 88%  2  Continue Zithromax and Plaquenil on monitor QTc  3  Continue antibiotics per Infectious Disease  4  Continue Solu-Medrol 50 mg IV q 12  5  Continue Lipitor, zinc and vitamin-C   6  Encourage self proning and incentive spirometry  Subjective:   Patient seen and examined  Sitting up in bed  Mild conversational dyspnea however patient states breathing easier today compared with yesterday  Objective:     Vitals: Blood pressure 125/82, pulse 94, temperature (!) 97 2 °F (36 2 °C), temperature source Oral, resp  rate 20, height 5' 8" (1 727 m), weight 101 kg (222 lb 10 6 oz), SpO2 90 %  , 7 L nasal cannula on my examination, Body mass index is 33 86 kg/m²  Intake/Output Summary (Last 24 hours) at 4/15/2020 1110  Last data filed at 4/15/2020 0740  Gross per 24 hour   Intake 660 ml   Output 990 ml   Net -330 ml         Physical Exam  Gen: Awake, alert, no acute distress  HEENT: Mucous membranes moist  NECK: No accessory muscle use  Cardiac: Regular, single S1, single S2  No murmurs  Lungs:  Equal breath sounds bilaterally  Currently no wheezing or crackles  Abdomen:  Present bowel sounds, soft nontender  Extremities: no cyanosis, no clubbing, no edema    Labs: I have personally reviewed pertinent lab results  , ABG: No results found for: PHART, EIN6IEI, PO2ART, IXV5QRV, D1CXVPGY, BEART, SOURCE, BNP: No results found for: BNP, CBC:   Lab Results   Component Value Date    WBC 14 17 (H) 04/15/2020    HGB 14 3 04/15/2020    HCT 44 1 04/15/2020    MCV 88 04/15/2020     (H) 04/15/2020    MCH 28 4 04/15/2020    MCHC 32 4 04/15/2020    RDW 12 5 04/15/2020    MPV 10 4 04/15/2020    NRBC 0 04/15/2020   , CMP:   Lab Results   Component Value Date    SODIUM 138 04/15/2020    K 3 3 (L) 04/15/2020     04/15/2020    CO2 23 04/15/2020    BUN 21 04/15/2020    CREATININE 0 76 04/15/2020    CALCIUM 9 1 04/15/2020     (H) 04/15/2020     (H) 04/15/2020    ALKPHOS 56 04/15/2020    EGFR 106 04/15/2020   , PT/INR: No results found for: PT, INR, Troponin: No results found for: TROPONINI  Imaging and other studies: I have personally reviewed pertinent reports     and I have personally reviewed pertinent films in PACS      Blanka Medina PA-C

## 2020-04-16 PROBLEM — R74.8 ELEVATED LIVER ENZYMES: Status: ACTIVE | Noted: 2020-04-16

## 2020-04-16 LAB
ALBUMIN SERPL BCP-MCNC: 2.2 G/DL (ref 3.5–5)
ALP SERPL-CCNC: 60 U/L (ref 46–116)
ALT SERPL W P-5'-P-CCNC: 241 U/L (ref 12–78)
ANION GAP SERPL CALCULATED.3IONS-SCNC: 7 MMOL/L (ref 4–13)
AST SERPL W P-5'-P-CCNC: 83 U/L (ref 5–45)
BILIRUB SERPL-MCNC: 0.24 MG/DL (ref 0.2–1)
BUN SERPL-MCNC: 23 MG/DL (ref 5–25)
CALCIUM SERPL-MCNC: 9 MG/DL (ref 8.3–10.1)
CHLORIDE SERPL-SCNC: 110 MMOL/L (ref 100–108)
CO2 SERPL-SCNC: 26 MMOL/L (ref 21–32)
CREAT SERPL-MCNC: 0.78 MG/DL (ref 0.6–1.3)
CRP SERPL QL: 37.2 MG/L
ERYTHROCYTE [DISTWIDTH] IN BLOOD BY AUTOMATED COUNT: 12.5 % (ref 11.6–15.1)
FERRITIN SERPL-MCNC: 1543 NG/ML (ref 8–388)
GFR SERPL CREATININE-BSD FRML MDRD: 105 ML/MIN/1.73SQ M
GLUCOSE SERPL-MCNC: 164 MG/DL (ref 65–140)
HCT VFR BLD AUTO: 44.5 % (ref 36.5–49.3)
HGB BLD-MCNC: 14.5 G/DL (ref 12–17)
IL6 SERPL-MCNC: 113.8 PG/ML (ref 0–12.2)
MCH RBC QN AUTO: 28.3 PG (ref 26.8–34.3)
MCHC RBC AUTO-ENTMCNC: 32.6 G/DL (ref 31.4–37.4)
MCV RBC AUTO: 87 FL (ref 82–98)
PLATELET # BLD AUTO: 555 THOUSANDS/UL (ref 149–390)
PMV BLD AUTO: 10.5 FL (ref 8.9–12.7)
POTASSIUM SERPL-SCNC: 3.9 MMOL/L (ref 3.5–5.3)
PROT SERPL-MCNC: 6.9 G/DL (ref 6.4–8.2)
RBC # BLD AUTO: 5.12 MILLION/UL (ref 3.88–5.62)
SODIUM SERPL-SCNC: 143 MMOL/L (ref 136–145)
WBC # BLD AUTO: 13.85 THOUSAND/UL (ref 4.31–10.16)

## 2020-04-16 PROCEDURE — 99232 SBSQ HOSP IP/OBS MODERATE 35: CPT | Performed by: INTERNAL MEDICINE

## 2020-04-16 PROCEDURE — 85027 COMPLETE CBC AUTOMATED: CPT | Performed by: INTERNAL MEDICINE

## 2020-04-16 PROCEDURE — 86140 C-REACTIVE PROTEIN: CPT | Performed by: INTERNAL MEDICINE

## 2020-04-16 PROCEDURE — 94760 N-INVAS EAR/PLS OXIMETRY 1: CPT

## 2020-04-16 PROCEDURE — 99232 SBSQ HOSP IP/OBS MODERATE 35: CPT | Performed by: PHYSICIAN ASSISTANT

## 2020-04-16 PROCEDURE — 80053 COMPREHEN METABOLIC PANEL: CPT | Performed by: INTERNAL MEDICINE

## 2020-04-16 PROCEDURE — 82728 ASSAY OF FERRITIN: CPT | Performed by: INTERNAL MEDICINE

## 2020-04-16 RX ORDER — METHYLPREDNISOLONE SODIUM SUCCINATE 125 MG/2ML
50 INJECTION, POWDER, LYOPHILIZED, FOR SOLUTION INTRAMUSCULAR; INTRAVENOUS EVERY 12 HOURS SCHEDULED
Status: COMPLETED | OUTPATIENT
Start: 2020-04-16 | End: 2020-04-16

## 2020-04-16 RX ORDER — PREDNISONE 20 MG/1
60 TABLET ORAL DAILY
Status: COMPLETED | OUTPATIENT
Start: 2020-04-17 | End: 2020-04-19

## 2020-04-16 RX ADMIN — HYDROXYCHLOROQUINE SULFATE 200 MG: 200 TABLET, FILM COATED ORAL at 09:42

## 2020-04-16 RX ADMIN — HYDROXYCHLOROQUINE SULFATE 200 MG: 200 TABLET, FILM COATED ORAL at 16:54

## 2020-04-16 RX ADMIN — Medication 250 MG: at 16:52

## 2020-04-16 RX ADMIN — Medication 250 MG: at 09:42

## 2020-04-16 RX ADMIN — MELATONIN 2000 UNITS: at 09:43

## 2020-04-16 RX ADMIN — METHYLPREDNISOLONE SODIUM SUCCINATE 50 MG: 125 INJECTION, POWDER, FOR SOLUTION INTRAMUSCULAR; INTRAVENOUS at 21:24

## 2020-04-16 RX ADMIN — OXYCODONE HYDROCHLORIDE AND ACETAMINOPHEN 1000 MG: 500 TABLET ORAL at 16:50

## 2020-04-16 RX ADMIN — POTASSIUM CHLORIDE 20 MEQ: 1500 TABLET, EXTENDED RELEASE ORAL at 16:51

## 2020-04-16 RX ADMIN — AZITHROMYCIN 250 MG: 250 TABLET, FILM COATED ORAL at 09:44

## 2020-04-16 RX ADMIN — POTASSIUM CHLORIDE 20 MEQ: 1500 TABLET, EXTENDED RELEASE ORAL at 09:43

## 2020-04-16 RX ADMIN — HYDROXYCHLOROQUINE SULFATE 200 MG: 200 TABLET, FILM COATED ORAL at 00:27

## 2020-04-16 RX ADMIN — ATORVASTATIN CALCIUM 40 MG: 40 TABLET, FILM COATED ORAL at 21:24

## 2020-04-16 RX ADMIN — ENOXAPARIN SODIUM 40 MG: 40 INJECTION SUBCUTANEOUS at 09:43

## 2020-04-16 RX ADMIN — ZINC SULFATE 220 MG (50 MG) CAPSULE 220 MG: CAPSULE at 09:43

## 2020-04-16 RX ADMIN — LOPERAMIDE HYDROCHLORIDE 2 MG: 2 CAPSULE ORAL at 13:36

## 2020-04-16 RX ADMIN — METHYLPREDNISOLONE SODIUM SUCCINATE 50 MG: 125 INJECTION, POWDER, FOR SOLUTION INTRAMUSCULAR; INTRAVENOUS at 09:44

## 2020-04-16 RX ADMIN — OXYCODONE HYDROCHLORIDE AND ACETAMINOPHEN 1000 MG: 500 TABLET ORAL at 09:42

## 2020-04-16 NOTE — PROGRESS NOTES
Progress Note - Pulmonary   Lory Richardsono 44 y o  male MRN: [id number]  Unit/Bed#: CW2 214-01 Encounter: [id number]      Assessment:  1  Acute hypoxic respiratory failure secondary to COVID-19 infection  2  COVID-19 multifocal pneumonia POA with elevated inflammatory markers  3   Diarrhea likely secondary to #1  C diff negative      Today's labs reviewed  White count improving 13 8 down from 14 1  Repeat Ferritin, CMP and CRP pending  Procalcitonin normalized on 04/15/2020    Plan:  1  Continue to titrate supplemental oxygen to maintain pulse ox greater than 88%  2  Ceftriaxone discontinue Infectious Disease  3   Continue Zithromax and Plaquenil and monitor QTc  4  Continue Lipitor, zinc and vitamin-C   5  Continue steroids and considered starting to taper tomorrow  6   Encourage out of bed, self proning and incentive spirometry  Subjective:   Patient seen and examined  States breathing easier and feeling more comfortable today compared with yesterday  Positive dry cough  Denies fevers, chills, chest pain  Objective:   Vitals: Blood pressure 119/75, pulse 82, temperature 97 5 °F (36 4 °C), temperature source Oral, resp  rate 18, height 5' 8" (1 727 m), weight 101 kg (223 lb 1 6 oz), SpO2 90 %  , 7 L nasal cannula, Body mass index is 33 92 kg/m²  Intake/Output Summary (Last 24 hours) at 4/16/2020 1035  Last data filed at 4/16/2020 0732  Gross per 24 hour   Intake 420 ml   Output 1450 ml   Net -1030 ml         Physical Exam  Gen: Awake, alert, oriented x 3, no acute distress  HEENT: Mucous membranes moist, no oral lesions, no thrush  NECK: No accessory muscle use, JVP not elevated  Cardiac: Regular, single S1, single S2, no murmurs, no rubs, no gallops  Lungs:  Mildly diminished otherwise clear to auscultation  Abdomen: normoactive bowel sounds, soft nontender, nondistended, no rebound or rigidity, no guarding  Extremities: no cyanosis, no clubbing, no edema    Labs:    I have personally reviewed pertinent lab results  , ABG: No results found for: PHART, YVW9PKA, PO2ART, YHZ8QIS, T5IZEWFG, BEART, SOURCE, BNP: No results found for: BNP, CBC:   Lab Results   Component Value Date    WBC 13 85 (H) 04/16/2020    HGB 14 5 04/16/2020    HCT 44 5 04/16/2020    MCV 87 04/16/2020     (H) 04/16/2020    MCH 28 3 04/16/2020    MCHC 32 6 04/16/2020    RDW 12 5 04/16/2020    MPV 10 5 04/16/2020   , CMP: No results found for: SODIUM, K, CL, CO2, ANIONGAP, BUN, CREATININE, GLUCOSE, CALCIUM, AST, ALT, ALKPHOS, PROT, BILITOT, EGFR, PT/INR: No results found for: PT, INR, Troponin: No results found for: TROPONINI  Imaging and other studies: I have personally reviewed pertinent reports     and I have personally reviewed pertinent films in PACS      Lindsey Nuñez PA-C

## 2020-04-16 NOTE — ASSESSMENT & PLAN NOTE
Likely due to viral illness and/or atorvastatin use    Awaiting today's LFTs, 3x above normal will discontinue lipitor

## 2020-04-16 NOTE — PROGRESS NOTES
Progress Note - Infectious Disease   Broderick Lim 44 y o  male MRN: [id number]  Unit/Bed#: CW2 214-01 Encounter: [id number]      Impression/Recommendations:  1   Severe COVID multifocal pneumonia   Unclear exposure history   Complicated by # 2   Procalcitonin 0 56 >> 0 23, ferritin 2650 >> 4594 > 1543,  >> 37 2, D-dimer 1 29  Status post 3 days IV ceftriaxone  Clinically stable without sepsis  Rec:  ? Discontinue azithromycin  ? Continue hydroxychloroquine, steroids   ? Continue vitamin-C, zinc, vitamin-D, atorvastatin  ? Follow-up [place] 6 levels  ? Check daily ECG  ? Recheck CRP, Ferritin in AM     2   Acute hypoxic respiratory failure   Due to # 1  O2 requirement slightly improved     Rec:  ? Continue antibiotics and COVID management as above  ? Follow respiratory status closely     3   Acute transaminitis   Likely due to # 1   Denies abdominal pain  LFTs slightly worse  Rec:  ? Recheck CMP in a m   ? Serial abdominal exam     4   Diarrhea   Suspect due to # 1  C diff negative      5   [place]     The above impression and plan was discussed in detail with the patient and Dr Cathy Lopez      Antibiotics:  Ceftriaxone x3  Azithromycin # 4  Hydroxychloroquine # 4  Steroids # 3  Vitamin-C/zinc/atorvastatin # 3    Subjective:  Patient seen on AM rounds  Feeling better  Continues to have diarrhea which he attributes to his medications  24 Hour Events:  No documented fevers, chills, sweats, nausea, vomiting, or diarrhea  Patient weaned down to 6L on oximizer      Objective:  Vitals:  Temp:  [97 5 °F (36 4 °C)-98 2 °F (36 8 °C)] 98 1 °F (36 7 °C)  HR:  [71-90] 71  Resp:  [18-22] 18  BP: (119-134)/(65-79) 134/79  SpO2:  [88 %-97 %] 93 %  Temp (24hrs), Av 9 °F (36 6 °C), Min:97 5 °F (36 4 °C), Max:98 2 °F (36 8 °C)  Current: Temperature: 98 1 °F (36 7 °C)    Physical Exam:   General:  No acute distress  Psychiatric:  Awake and alert  Pulmonary:  Normal respiratory excursion without accessory muscle use  Abdomen:  Soft, nontender  Extremities:  No edema  Skin:  No rashes    Lab Results:  I have personally reviewed pertinent labs  Results from last 7 days   Lab Units 04/16/20  1013 04/15/20  0611 04/14/20  0503   POTASSIUM mmol/L 3 9 3 3* 4 0   CHLORIDE mmol/L 110* 107 107   CO2 mmol/L 26 23 25   BUN mg/dL 23 21 14   CREATININE mg/dL 0 78 0 76 0 63   EGFR ml/min/1 73sq m 105 106 114   CALCIUM mg/dL 9 0 9 1 8 7   AST U/L 83* 149* 107*   ALT U/L 241* 230* 117*   ALK PHOS U/L 60 56 53     Results from last 7 days   Lab Units 04/16/20  1013 04/15/20  0611 04/14/20  0503   WBC Thousand/uL 13 85* 14 17* 8 79   HEMOGLOBIN g/dL 14 5 14 3 14 1   PLATELETS Thousands/uL 555* 465* 349     Results from last 7 days   Lab Units 04/14/20  1827 04/13/20  1746 04/13/20  1733   BLOOD CULTURE   --  No Growth at 48 hrs  No Growth at 48 hrs  C DIFF TOXIN B  Negative  --   --        Imaging Studies:   I have personally reviewed pertinent imaging study reports and images in PACS  EKG, Pathology, and Other Studies:   I have personally reviewed pertinent reports

## 2020-04-16 NOTE — PLAN OF CARE
Problem: PAIN - ADULT  Goal: Verbalizes/displays adequate comfort level or baseline comfort level  Description  Interventions:  - Encourage patient to monitor pain and request assistance  - Assess pain using appropriate pain scale  - Administer analgesics based on type and severity of pain and evaluate response  - Implement non-pharmacological measures as appropriate and evaluate response  - Consider cultural and social influences on pain and pain management  - Notify physician/advanced practitioner if interventions unsuccessful or patient reports new pain  Outcome: Progressing     Problem: INFECTION - ADULT  Goal: Absence or prevention of progression during hospitalization  Description  INTERVENTIONS:  - Assess and monitor for signs and symptoms of infection  - Monitor lab/diagnostic results  - Monitor all insertion sites, i e  indwelling lines, tubes, and drains  - Monitor endotracheal if appropriate and nasal secretions for changes in amount and color  - [place] appropriate cooling/warming therapies per order  - Administer medications as ordered  - Instruct and encourage patient and family to use good hand hygiene technique  - Identify and instruct in appropriate isolation precautions for identified infection/condition  Outcome: Progressing  Goal: Absence of fever/infection during neutropenic period  Description  INTERVENTIONS:  - Monitor WBC    Outcome: Progressing     Problem: SAFETY ADULT  Goal: Patient will remain free of falls  Description  INTERVENTIONS:  - Assess patient frequently for physical needs  -  Identify cognitive and physical deficits and behaviors that affect risk of falls    -  [place] fall precautions as indicated by assessment   - Educate patient/family on patient safety including physical limitations  - Instruct patient to call for assistance with activity based on assessment  - Modify environment to reduce risk of injury  - Consider OT/PT consult to assist with strengthening/mobility  Outcome: Progressing  Goal: Maintain or return to baseline ADL function  Description  INTERVENTIONS:  -  Assess patient's ability to carry out ADLs; assess patient's baseline for ADL function and identify physical deficits which impact ability to perform ADLs (bathing, care of mouth/teeth, toileting, grooming, dressing, etc )  - Assess/evaluate cause of self-care deficits   - Assess range of motion  - Assess patient's mobility; develop plan if impaired  - Assess patient's need for assistive devices and provide as appropriate  - Encourage maximum independence but intervene and supervise when necessary  - Involve family in performance of ADLs  - Assess for home care needs following discharge   - Consider OT consult to assist with ADL evaluation and planning for discharge  - Provide patient education as appropriate  Outcome: Progressing  Goal: Maintain or return mobility status to optimal level  Description  INTERVENTIONS:  - Assess patient's baseline mobility status (ambulation, transfers, stairs, etc )    - Identify cognitive and physical deficits and behaviors that affect mobility  - Identify mobility aids required to assist with transfers and/or ambulation (gait belt, sit-to-stand, lift, walker, cane, etc )  - [place] fall precautions as indicated by assessment  - Record patient progress and toleration of activity level on Mobility SBAR; progress patient to next Phase/Stage  - Instruct patient to call for assistance with activity based on assessment  - Consider rehabilitation consult to assist with strengthening/weightbearing, etc   Outcome: Progressing     Problem: DISCHARGE PLANNING  Goal: Discharge to home or other facility with appropriate resources  Description  INTERVENTIONS:  - Identify barriers to discharge w/patient and caregiver  - Arrange for needed discharge resources and transportation as appropriate  - Identify discharge learning needs (meds, wound care, etc )  - Arrange for interpretive services to assist at discharge as needed  - Refer to Case Management Department for coordinating discharge planning if the patient needs post-hospital services based on physician/advanced practitioner order or complex needs related to functional status, cognitive ability, or social support system  Outcome: Progressing     Problem: Knowledge Deficit  Goal: Patient/family/caregiver demonstrates understanding of disease process, treatment plan, medications, and discharge instructions  Description  Complete learning assessment and assess knowledge base  Interventions:  - Provide teaching at level of understanding  - Provide teaching via preferred learning methods  Outcome: Progressing     Problem: DISCHARGE PLANNING - CARE MANAGEMENT  Goal: Discharge to post-acute care or home with appropriate resources  Description  INTERVENTIONS:  - Conduct assessment to determine patient/family and health care team treatment goals, and need for post-acute services based on payer coverage, community resources, and patient preferences, and barriers to discharge  - Address psychosocial, clinical, and financial barriers to discharge as identified in assessment in conjunction with the patient/family and health care team  - Arrange appropriate level of post-acute services according to patients   needs and preference and payer coverage in collaboration with the physician and health care team  - Communicate with and update the patient/family, physician, and health care team regarding progress on the discharge plan  - Arrange appropriate transportation to post-acute venues  Outcome: Progressing     Problem: Nutrition/Hydration-ADULT  Goal: Nutrient/Hydration intake appropriate for improving, restoring or maintaining nutritional needs  Description  Monitor and assess patient's nutrition/hydration status for malnutrition  Collaborate with interdisciplinary team and initiate plan and interventions as ordered    Monitor patient's weight and dietary intake as ordered or per policy  Utilize nutrition screening tool and intervene as necessary  Determine patient's food preferences and provide high-protein, high-caloric foods as appropriate       INTERVENTIONS:  - Monitor oral intake, urinary output, labs, and treatment plans  - Assess nutrition and hydration status and recommend course of action  - Evaluate amount of meals eaten  - Assist patient with eating if necessary   - Allow adequate time for meals  - Recommend/ encourage appropriate diets, oral nutritional supplements, and vitamin/mineral supplements  - Order, calculate, and assess calorie counts as needed  - Recommend, monitor, and adjust tube feedings and TPN/PPN based on assessed needs  - Assess need for intravenous fluids  - Provide specific nutrition/hydration education as appropriate  - Include patient/family/caregiver in decisions related to nutrition  Outcome: Progressing     Problem: RESPIRATORY - ADULT  Goal: Achieves optimal ventilation and oxygenation  Description  INTERVENTIONS:  - Assess for changes in respiratory status  - Assess for changes in mentation and behavior  - Position to facilitate oxygenation and minimize respiratory effort  - Oxygen administered by appropriate delivery if ordered  - Initiate smoking cessation education as indicated  - Encourage broncho-pulmonary hygiene including cough, deep breathe, Incentive Spirometry  - Assess the need for suctioning and aspirate as needed  - Assess and instruct to report SOB or any respiratory difficulty  - Respiratory Therapy support as indicated  Outcome: Progressing     Problem: METABOLIC, FLUID AND ELECTROLYTES - ADULT  Goal: Electrolytes maintained within normal limits  Description  INTERVENTIONS:  - Monitor labs and assess patient for signs and symptoms of electrolyte imbalances  - Administer electrolyte replacement as ordered  - Monitor response to electrolyte replacements, including repeat lab results as appropriate  - Instruct patient on fluid and nutrition as appropriate  Outcome: Progressing  Goal: Fluid balance maintained  Description  INTERVENTIONS:  - Monitor labs   - Monitor I/O and WT  - Instruct patient on fluid and nutrition as appropriate  - Assess for signs & symptoms of volume excess or deficit  Outcome: Progressing  Goal: Glucose maintained within target range  Description  INTERVENTIONS:  - Monitor Blood Glucose as ordered  - Assess for signs and symptoms of hyperglycemia and hypoglycemia  - Administer ordered medications to maintain glucose within target range  - Assess nutritional intake and initiate nutrition service referral as needed  Outcome: Progressing     Problem: Potential for Falls  Goal: Patient will remain free of falls  Description  INTERVENTIONS:  - Assess patient frequently for physical needs  -  Identify cognitive and physical deficits and behaviors that affect risk of falls    -  [place] fall precautions as indicated by assessment   - Educate patient/family on patient safety including physical limitations  - Instruct patient to call for assistance with activity based on assessment  - Modify environment to reduce risk of injury  - Consider OT/PT consult to assist with strengthening/mobility  Outcome: Progressing

## 2020-04-16 NOTE — PROGRESS NOTES
Progress Note - [address] 1970, 44 y o  male MRN: [id number]    Unit/Bed#: CW2 214-01 Encounter: [id number]    Primary Care Provider: Iman Dueñas MD   Date and time admitted to hospital: 4/13/2020  4:58 PM        Elevated liver enzymes  Assessment & Plan  Likely due to viral illness and/or atorvastatin use  Awaiting today's LFTs, 3x above normal will discontinue lipitor    Diarrhea  Assessment & Plan  improved  Likely due to COVID-19  Stool for C diff was negative  Continue probiotic  Prn imodium    Vitamin D deficiency  Assessment & Plan  Continue vitamin D supplementation    Acute respiratory failure with hypoxia (HCC)  Assessment & Plan  As above    * COVID-19 virus infection  Assessment & Plan  Positive COVID-19 PCR  Severe disease due to viral pneumonia and and high oxygen requirement  With acute respiratory failure  Improved oxygenation today, continue to wean down as tolerated  Continue prone positioning  Continue ceftriaxone, azithromycin, corticosteroids, hydroxychloroquine, vitamin C, Zinc, atorvastatin, vitamin D  Monitor Procalcitonin, ferritin, CRP, CBC, temps  VTE Pharmacologic Prophylaxis:   Pharmacologic: Heparin  Mechanical VTE Prophylaxis in Place: Yes    Patient Centered Rounds: I have performed bedside rounds with nursing staff today  Discussions with Specialists or Other Care Team Provider: pulmonology    Education and Discussions with Family / Patient: significant other, plan of care    Time Spent for Care: 15 minutes  More than 50% of total time spent on counseling and coordination of care as described above  Current Length of Stay: 3 day(s)    Current Patient Status: Inpatient   Certification Statement: The patient will continue to require additional inpatient hospital stay due to hypoxia, wean down oxygen    Discharge Plan: home when stable    Code Status: Level 1 - Full Code      Subjective:   Reports improved SOB and cough   Had a loose BM this AM  Tolerating diet    Objective:     Vitals:   Temp (24hrs), Av 8 °F (36 6 °C), Min:97 5 °F (36 4 °C), Max:98 2 °F (36 8 °C)    Temp:  [97 5 °F (36 4 °C)-98 2 °F (36 8 °C)] 98 1 °F (36 7 °C)  HR:  [71-92] 71  Resp:  [18-22] 18  BP: (119-134)/(65-79) 134/79  SpO2:  [86 %-97 %] 93 %  Body mass index is 33 92 kg/m²  Input and Output Summary (last 24 hours): Intake/Output Summary (Last 24 hours) at 2020 1050  Last data filed at 2020 1039  Gross per 24 hour   Intake 420 ml   Output 1550 ml   Net -1130 ml       Physical Exam:     Physical Exam   Constitutional: He is oriented to person, place, and time  He appears well-developed and well-nourished  HENT:   Head: Normocephalic and atraumatic  Eyes: Pupils are equal, round, and reactive to light  EOM are normal    Neck: Neck supple  Cardiovascular: Normal rate and regular rhythm  Pulmonary/Chest: Effort normal    Abdominal: Soft  Neurological: He is alert and oriented to person, place, and time  Skin: Skin is warm and dry  Additional Data:     Labs:    Results from last 7 days   Lab Units 20  1013  20  0503  20  1746   WBC Thousand/uL 13 85*   < > 8 79  --  11 66*   HEMOGLOBIN g/dL 14 5   < > 14 1  --  14 8   HEMATOCRIT % 44 5   < > 43 6  --  44 8   PLATELETS Thousands/uL 555*   < > 349   < > 333   NEUTROS PCT %  --   --   --   --  89*   LYMPHS PCT %  --   --   --   --  7*   LYMPHO PCT %  --   --  0*  --   --    MONOS PCT %  --   --   --   --  3*   MONO PCT %  --   --  0*  --   --    EOS PCT %  --   --  0  --  0    < > = values in this interval not displayed       Results from last 7 days   Lab Units 20  1013   SODIUM mmol/L 143   POTASSIUM mmol/L 3 9   CHLORIDE mmol/L 110*   CO2 mmol/L 26   BUN mg/dL 23   CREATININE mg/dL 0 78   ANION GAP mmol/L 7   CALCIUM mg/dL 9 0   ALBUMIN g/dL 2 2*   TOTAL BILIRUBIN mg/dL 0 24   ALK PHOS U/L 60   ALT U/L 241*   AST U/L 83*   GLUCOSE RANDOM mg/dL 164*     Results from last 7 days   Lab Units 04/14/20  0503   INR  1 21*             Results from last 7 days   Lab Units 04/15/20  0611 04/14/20  0503 04/13/20  1746   LACTIC ACID mmol/L  --   --  1 6   PROCALCITONIN ng/ml 0 23 0 40* 0 56*           * I Have Reviewed All Lab Data Listed Above  * Additional Pertinent Lab Tests Reviewed: All Labs Within Last 24 Hours Reviewed      Recent Cultures (last 7 days):     Results from last 7 days   Lab Units 04/14/20  1827 04/13/20  1746 04/13/20  1733   BLOOD CULTURE   --  No Growth at 48 hrs  No Growth at 48 hrs  C DIFF TOXIN B  Negative  --   --        Last 24 Hours Medication List:     Current Facility-Administered Medications:  ascorbic acid 1,000 mg Oral BID Grace Yao MD   atorvastatin 40 mg Oral HS Grace Yao MD   azithromycin 250 mg Oral Q24H Grace Yao MD   cholecalciferol 2,000 Units Oral Daily Pablo Bradford MD   enoxaparin 40 mg Subcutaneous Q24H Sharmaine Garcia MD   hydroxychloroquine 200 mg Oral Q8H Grace Yao MD   loperamide 2 mg Oral TID PRN Pablo Bradford MD   methylPREDNISolone sodium succinate 50 mg Intravenous Q12H Yonas Aranda MD   potassium chloride 20 mEq Oral BID Pablo Bradford MD   saccharomyces boulardii 250 mg Oral BID Pablo Bradford MD   zinc sulfate 220 mg Oral Daily Grace Yao MD        Today, Patient Was Seen By: Karen Guevara MD    ** Please Note: Dictation voice to text software may have been used in the creation of this document   **

## 2020-04-17 LAB
ALBUMIN SERPL BCP-MCNC: 2.1 G/DL (ref 3.5–5)
ALP SERPL-CCNC: 58 U/L (ref 46–116)
ALT SERPL W P-5'-P-CCNC: 205 U/L (ref 12–78)
ANION GAP SERPL CALCULATED.3IONS-SCNC: 4 MMOL/L (ref 4–13)
AST SERPL W P-5'-P-CCNC: 53 U/L (ref 5–45)
BILIRUB DIRECT SERPL-MCNC: 0.13 MG/DL (ref 0–0.2)
BILIRUB SERPL-MCNC: 0.35 MG/DL (ref 0.2–1)
BUN SERPL-MCNC: 22 MG/DL (ref 5–25)
CALCIUM SERPL-MCNC: 8.7 MG/DL (ref 8.3–10.1)
CHLORIDE SERPL-SCNC: 112 MMOL/L (ref 100–108)
CO2 SERPL-SCNC: 27 MMOL/L (ref 21–32)
CREAT SERPL-MCNC: 0.64 MG/DL (ref 0.6–1.3)
CRP SERPL QL: 21.8 MG/L
FERRITIN SERPL-MCNC: 992 NG/ML (ref 8–388)
GFR SERPL CREATININE-BSD FRML MDRD: 113 ML/MIN/1.73SQ M
GLUCOSE SERPL-MCNC: 127 MG/DL (ref 65–140)
POTASSIUM SERPL-SCNC: 4.9 MMOL/L (ref 3.5–5.3)
PROT SERPL-MCNC: 6.7 G/DL (ref 6.4–8.2)
SODIUM SERPL-SCNC: 143 MMOL/L (ref 136–145)

## 2020-04-17 PROCEDURE — 80048 BASIC METABOLIC PNL TOTAL CA: CPT | Performed by: INTERNAL MEDICINE

## 2020-04-17 PROCEDURE — 82728 ASSAY OF FERRITIN: CPT | Performed by: INTERNAL MEDICINE

## 2020-04-17 PROCEDURE — 94760 N-INVAS EAR/PLS OXIMETRY 1: CPT

## 2020-04-17 PROCEDURE — 99233 SBSQ HOSP IP/OBS HIGH 50: CPT | Performed by: INTERNAL MEDICINE

## 2020-04-17 PROCEDURE — 86140 C-REACTIVE PROTEIN: CPT | Performed by: INTERNAL MEDICINE

## 2020-04-17 PROCEDURE — 99232 SBSQ HOSP IP/OBS MODERATE 35: CPT | Performed by: PHYSICIAN ASSISTANT

## 2020-04-17 PROCEDURE — 80076 HEPATIC FUNCTION PANEL: CPT | Performed by: INTERNAL MEDICINE

## 2020-04-17 PROCEDURE — 99232 SBSQ HOSP IP/OBS MODERATE 35: CPT | Performed by: INTERNAL MEDICINE

## 2020-04-17 RX ORDER — LORAZEPAM 0.5 MG/1
0.5 TABLET ORAL EVERY 8 HOURS PRN
Status: DISCONTINUED | OUTPATIENT
Start: 2020-04-17 | End: 2020-04-23 | Stop reason: HOSPADM

## 2020-04-17 RX ORDER — PREDNISONE 10 MG/1
10 TABLET ORAL DAILY
Status: DISCONTINUED | OUTPATIENT
Start: 2020-05-02 | End: 2020-04-23 | Stop reason: HOSPADM

## 2020-04-17 RX ORDER — PREDNISONE 20 MG/1
20 TABLET ORAL DAILY
Status: DISCONTINUED | OUTPATIENT
Start: 2020-04-29 | End: 2020-04-23 | Stop reason: HOSPADM

## 2020-04-17 RX ORDER — PREDNISONE 20 MG/1
40 TABLET ORAL DAILY
Status: DISCONTINUED | OUTPATIENT
Start: 2020-04-23 | End: 2020-04-23 | Stop reason: HOSPADM

## 2020-04-17 RX ADMIN — ENOXAPARIN SODIUM 100 MG: 100 INJECTION SUBCUTANEOUS at 21:32

## 2020-04-17 RX ADMIN — Medication 250 MG: at 08:30

## 2020-04-17 RX ADMIN — HYDROXYCHLOROQUINE SULFATE 200 MG: 200 TABLET, FILM COATED ORAL at 08:30

## 2020-04-17 RX ADMIN — OXYCODONE HYDROCHLORIDE AND ACETAMINOPHEN 1000 MG: 500 TABLET ORAL at 16:11

## 2020-04-17 RX ADMIN — OXYCODONE HYDROCHLORIDE AND ACETAMINOPHEN 1000 MG: 500 TABLET ORAL at 08:29

## 2020-04-17 RX ADMIN — HYDROXYCHLOROQUINE SULFATE 200 MG: 200 TABLET, FILM COATED ORAL at 16:10

## 2020-04-17 RX ADMIN — ATORVASTATIN CALCIUM 40 MG: 40 TABLET, FILM COATED ORAL at 21:32

## 2020-04-17 RX ADMIN — MELATONIN 2000 UNITS: at 08:31

## 2020-04-17 RX ADMIN — ENOXAPARIN SODIUM 40 MG: 40 INJECTION SUBCUTANEOUS at 08:31

## 2020-04-17 RX ADMIN — Medication 250 MG: at 16:10

## 2020-04-17 RX ADMIN — ZINC SULFATE 220 MG (50 MG) CAPSULE 220 MG: CAPSULE at 08:30

## 2020-04-17 RX ADMIN — HYDROXYCHLOROQUINE SULFATE 200 MG: 200 TABLET, FILM COATED ORAL at 00:49

## 2020-04-17 RX ADMIN — PREDNISONE 60 MG: 20 TABLET ORAL at 08:30

## 2020-04-17 NOTE — ASSESSMENT & PLAN NOTE
Positive COVID-19 PCR  Severe disease due to viral pneumonia and and high oxygen requirement  With acute respiratory failure  Continues to have high oxygen requirements, wean as tolerated  Continue prone positioning as tolerated  Continue ceftriaxone, azithromycin, corticosteroids, hydroxychloroquine, vitamin C, Zinc, atorvastatin, vitamin D  Monitor Procalcitonin, ferritin, CRP, CBC, temps  Recheck D-dimer in a m    As discussed with pulmonology, due to persistent hypoxia despite improvement in his viral symptoms will place on full-dose anticoagulation for possible pulmonary embolism

## 2020-04-17 NOTE — PROGRESS NOTES
Progress Note - [address] 1970, 44 y o  male MRN: [id number]    Unit/Bed#: CW2 214-01 Encounter: [id number]    Primary Care Provider: Sg Norris MD   Date and time admitted to hospital: 4/13/2020  4:58 PM        Elevated liver enzymes  Assessment & Plan  Likely due to viral illness and/or atorvastatin use  LFTs improved    Diarrhea  Assessment & Plan  improved  Likely due to COVID-19  Stool for C diff was negative  Continue probiotic  Add banatrol  Prn imodium    Vitamin D deficiency  Assessment & Plan  Continue vitamin D supplementation    Acute respiratory failure with hypoxia (HCC)  Assessment & Plan  As above    * COVID-19 virus infection  Assessment & Plan  Positive COVID-19 PCR  Severe disease due to viral pneumonia and and high oxygen requirement  With acute respiratory failure  Continues to have high oxygen requirements, wean as tolerated  Continue prone positioning as tolerated  Continue ceftriaxone, azithromycin, corticosteroids, hydroxychloroquine, vitamin C, Zinc, atorvastatin, vitamin D  Monitor Procalcitonin, ferritin, CRP, CBC, temps  Recheck D-dimer in a m  As discussed with pulmonology, due to persistent hypoxia despite improvement in his viral symptoms will place on full-dose anticoagulation for possible pulmonary embolism          VTE Pharmacologic Prophylaxis:   Pharmacologic: Enoxaparin (Lovenox)  Mechanical VTE Prophylaxis in Place: Yes    Patient Centered Rounds: I have performed bedside rounds with nursing staff today  Discussions with Specialists or Other Care Team Provider:  Infectious Disease, pulmonology    Education and Discussions with Family / Patient:  Patient and significant other, plan of care    Time Spent for Care: 20 minutes  More than 50% of total time spent on counseling and coordination of care as described above      Current Length of Stay: 4 day(s)    Current Patient Status: Inpatient   Certification Statement: The patient will continue to require additional inpatient hospital stay due to Hypoxic    Discharge Plan:  Home when stable    Code Status: Level 1 - Full Code      Subjective:   Reports that shortness of breath is improved continues to have some loose stools but also improved from prior  Appears very frustrated    Objective:     Vitals:   Temp (24hrs), Av 9 °F (36 6 °C), Min:97 7 °F (36 5 °C), Max:98 °F (36 7 °C)    Temp:  [97 7 °F (36 5 °C)-98 °F (36 7 °C)] 98 °F (36 7 °C)  HR:  [54-95] 69  Resp:  [16-20] 20  BP: (118-141)/(60-76) 123/74  SpO2:  [87 %-100 %] 99 %  Body mass index is 33 92 kg/m²  Input and Output Summary (last 24 hours): Intake/Output Summary (Last 24 hours) at 2020 1748  Last data filed at 2020 1200  Gross per 24 hour   Intake 180 ml   Output 875 ml   Net -695 ml       Physical Exam:     Physical Exam   Constitutional: He is oriented to person, place, and time  He appears well-developed and well-nourished  HENT:   Head: Normocephalic and atraumatic  Eyes: EOM are normal    Neck: Neck supple  Cardiovascular: Normal rate  Pulmonary/Chest: Effort normal  He has no wheezes  He has no rales  Abdominal: He exhibits no distension  Musculoskeletal: He exhibits no edema  Neurological: He is alert and oriented to person, place, and time  Skin: Skin is warm and dry  Psychiatric:   Frustrated         Additional Data:     Labs:    Results from last 7 days   Lab Units 20  1013  20  0503  20  1746   WBC Thousand/uL 13 85*   < > 8 79  --  11 66*   HEMOGLOBIN g/dL 14 5   < > 14 1  --  14 8   HEMATOCRIT % 44 5   < > 43 6  --  44 8   PLATELETS Thousands/uL 555*   < > 349   < > 333   NEUTROS PCT %  --   --   --   --  89*   LYMPHS PCT %  --   --   --   --  7*   LYMPHO PCT %  --   --  0*  --   --    MONOS PCT %  --   --   --   --  3*   MONO PCT %  --   --  0*  --   --    EOS PCT %  --   --  0  --  0    < > = values in this interval not displayed       Results from last 7 days   Lab Units 04/17/20  0443   SODIUM mmol/L 143   POTASSIUM mmol/L 4 9   CHLORIDE mmol/L 112*   CO2 mmol/L 27   BUN mg/dL 22   CREATININE mg/dL 0 64   ANION GAP mmol/L 4   CALCIUM mg/dL 8 7   ALBUMIN g/dL 2 1*   TOTAL BILIRUBIN mg/dL 0 35   ALK PHOS U/L 58   ALT U/L 205*   AST U/L 53*   GLUCOSE RANDOM mg/dL 127     Results from last 7 days   Lab Units 04/14/20  0503   INR  1 21*             Results from last 7 days   Lab Units 04/15/20  0611 04/14/20  0503 04/13/20  1746   LACTIC ACID mmol/L  --   --  1 6   PROCALCITONIN ng/ml 0 23 0 40* 0 56*           * I Have Reviewed All Lab Data Listed Above  * Additional Pertinent Lab Tests Reviewed: All Labs Within Last 24 Hours Reviewed      Recent Cultures (last 7 days):     Results from last 7 days   Lab Units 04/14/20  1827 04/13/20  1746 04/13/20  1733   BLOOD CULTURE   --  No Growth at 72 hrs  No Growth at 72 hrs     C DIFF TOXIN B  Negative  --   --        Last 24 Hours Medication List:     Current Facility-Administered Medications:  ascorbic acid 1,000 mg Oral BID Mindy Castillo MD   atorvastatin 40 mg Oral HS Mindy Castillo MD   cholecalciferol 2,000 Units Oral Daily Jonathan Javed MD   enoxaparin 1 mg/kg Subcutaneous Q12H Beka Gunn MD   loperamide 2 mg Oral TID PRN Jonathan Javed MD   LORazepam 0 5 mg Oral Q8H PRN MD Gato Anderson ON 4/20/2020] predniSONE 50 mg Oral Daily Jonathan Javed MD   Followed by       Gato Mendoza ON 4/23/2020] predniSONE 40 mg Oral Daily Jonathan Javed MD   Followed by       Gato Mendoza ON 4/26/2020] predniSONE 30 mg Oral Daily Jonathan Javed MD   Followed by       Gato Mendoza ON 4/29/2020] predniSONE 20 mg Oral Daily Jonathan Javed MD   Followed by       Gato Mendoza ON 5/2/2020] predniSONE 10 mg Oral Daily Jonathan Javed MD   predniSONE 60 mg Oral Daily Jonathan Javed MD   saccharomyces boulardii 250 mg Oral BID Jonathan Javed MD   zinc sulfate 220 mg Oral Daily Mindy Castillo MD        Today, Patient Was Seen By: Dani Barney MD    ** Please Note: Dictation voice to text software may have been used in the creation of this document   **

## 2020-04-17 NOTE — ASSESSMENT & PLAN NOTE
improved  Likely due to COVID-19  Stool for C diff was negative  Continue probiotic  Add banatrol  Prn imodium

## 2020-04-17 NOTE — PROGRESS NOTES
Progress Note - Pulmonary   Cayla Lmi 44 y o  male MRN: [id number]  Unit/Bed#: CW2 214-01 Encounter: [id number]      Assessment:  1  Acute hypoxic respiratory failure secondary to COVID-19 infection  2  COVID-19 pneumonia POA with elevated inflammatory markers specially ferritin  3  Presumed superimposed community-acquired pneumonia with elevated procalcitonin and leukocytosis/mild left shift  4  Diarrhea most likely secondary to COVID-19 infection, negative C diff    Ferritin level and CRP trending down today    Plan:  1  Titrate supplemental oxygen to maintain pulse ox greater than 88%  2  Plaquenil day # 5/5 Continue Lipitor, vitamin-C and zinc   Completed Zithromax  3  Moderate QTC  4  Infectious Disease following  5  Encourage self proning, incentive spirometry and pulmonary toilet  6  Prednisone taper  7  Patient with increased oxygen demand past 24 hours may require longer time in step-down unit  Subjective:   Patient seen examined  Patient has increased O2 demand compared with yesterday and currently on 12 L nasal cannula with SpO2 of 91%  Patient however states feels that his breathing easier compared with yesterday  Denies any significant cough, chest pain, fever or chills  Patient states has been out of bed, using incentive spirometer and self proning  Objective:   Vitals: Blood pressure 119/74, pulse 95, temperature 97 9 °F (36 6 °C), resp  rate 20, height 5' 8" (1 727 m), weight 101 kg (223 lb 1 7 oz), SpO2 91 %  , 12 L nasal cannula, Body mass index is 33 92 kg/m²  Intake/Output Summary (Last 24 hours) at 4/17/2020 0949  Last data filed at 4/16/2020 2301  Gross per 24 hour   Intake 280 ml   Output 225 ml   Net 55 ml         Physical Exam  Gen: Awake, alert, oriented x 3, mild tachypnea  HEENT: Mucous membranes moist  NECK: No accessory muscle use  Cardiac: Regular, single S1, single S2, no murmurs, no rubs, no gallops  Lungs:  Mildly diminished throughout all lung fields    No crackles or wheezes  Abdomen: normoactive bowel sounds, soft nontender  Extremities: no cyanosis, no clubbing, no edema    Labs: I have personally reviewed pertinent lab results  , ABG: No results found for: PHART, PRZ1CSI, PO2ART, KYM5SGC, A9IDNMYK, BEART, SOURCE, BNP: No results found for: BNP, CBC:   Lab Results   Component Value Date    WBC 13 85 (H) 04/16/2020    HGB 14 5 04/16/2020    HCT 44 5 04/16/2020    MCV 87 04/16/2020     (H) 04/16/2020    MCH 28 3 04/16/2020    MCHC 32 6 04/16/2020    RDW 12 5 04/16/2020    MPV 10 5 04/16/2020   , CMP:   Lab Results   Component Value Date    SODIUM 143 04/17/2020    K 4 9 04/17/2020     (H) 04/17/2020    CO2 27 04/17/2020    BUN 22 04/17/2020    CREATININE 0 64 04/17/2020    CALCIUM 8 7 04/17/2020    AST 53 (H) 04/17/2020     (H) 04/17/2020    ALKPHOS 58 04/17/2020    EGFR 113 04/17/2020   , PT/INR: No results found for: PT, INR, Troponin: No results found for: TROPONINI  Imaging and other studies: I have personally reviewed pertinent reports     and I have personally reviewed pertinent films in PACS      Rogerio Obrien PA-C

## 2020-04-17 NOTE — PROGRESS NOTES
Progress Note - Infectious Disease   Ivis Lim 44 y o  male MRN: [id number]  Unit/Bed#: CW2 214-01 Encounter: [id number]      Impression/Recommendations:  1   Severe COVID multifocal pneumonia   Unclear exposure history   Complicated by # 2   Procalcitonin 0 56 >> 0 23, ferritin 2650 >> 4594 >> 992,  >> 21 8, D-dimer 1 29   Status post 3 days IV ceftriaxone, azithromycin  Clinically stable without sepsis  Rec:  ? Continue steroids vitamin-C, zinc, vitamin-D, atorvastatin  ? Follow respiratory status closely  ? Lab holiday tomorrow     2   Acute hypoxic respiratory failure   Due to # 1   O2 requirement slightly worse but possibly due to increased activity     Rec:  ? Continue antibiotics and COVID management as above  ? Follow respiratory status closely     3   Acute transaminitis   Likely due to # 1   Denies abdominal pain   LFTs now improving  Rec:  ? Serial abdominal exam     4   Diarrhea   Suspect due to # 1  C diff negative      5   MO     The above impression and plan was discussed in detail with the patient and Dr Rosas Laguerre      Antibiotics:  Ceftriaxone x3  Azithromycin x4  Hydroxychloroquine # 5  Steroids # 4  Vitamin-C/zinc/VitD/atorvastatin # 4    Subjective:  Patient seen on AM rounds  He continues to feel better and actually wants to go home  Denies fevers, chills, sweats, nausea, vomiting  Continues to have intermittent diarrhea  24 Hour Events:  Patient back on 12 L nasal cannula due to some desaturations with ambulating to the bathroom yesterday  Continues to use self prodding an incentive spirometer      Objective:  Vitals:  Temp:  [97 7 °F (36 5 °C)-98 1 °F (36 7 °C)] 98 °F (36 7 °C)  HR:  [51-96] 70  Resp:  [16-20] 20  BP: (118-141)/(60-79) 118/73  SpO2:  [87 %-100 %] 94 %  Temp (24hrs), Av 9 °F (36 6 °C), Min:97 7 °F (36 5 °C), Max:98 1 °F (36 7 °C)  Current: Temperature: 98 °F (36 7 °C)    Physical Exam:   General:  No acute distress  Eyes:  Normal lids and conjunctivae  ENT:  Normal external ears and nose  Neck:  Neck symmetric with midline trachea  Pulmonary:  Normal respiratory effort without accessory muscle use, clear to auscultation bilaterally  Cardiovascular:  Regular rate and rhythm; no peripheral edema  Gastrointestinal:  No tenderness or distention  Musculoskeletal:  No digital clubbing or cyanosis  Skin:  No visible rashes; No palpable nodules  Neurologic:  Sensation grossly intact to light touch  Psychiatric:  Alert and oriented; flat affect    Lab Results:  I have personally reviewed pertinent labs  Results from last 7 days   Lab Units 04/17/20  0443 04/16/20  1013 04/15/20  0611   POTASSIUM mmol/L 4 9 3 9 3 3*   CHLORIDE mmol/L 112* 110* 107   CO2 mmol/L 27 26 23   BUN mg/dL 22 23 21   CREATININE mg/dL 0 64 0 78 0 76   EGFR ml/min/1 73sq m 113 105 106   CALCIUM mg/dL 8 7 9 0 9 1   AST U/L 53* 83* 149*   ALT U/L 205* 241* 230*   ALK PHOS U/L 58 60 56     Results from last 7 days   Lab Units 04/16/20  1013 04/15/20  0611 04/14/20  0503   WBC Thousand/uL 13 85* 14 17* 8 79   HEMOGLOBIN g/dL 14 5 14 3 14 1   PLATELETS Thousands/uL 555* 465* 349     Results from last 7 days   Lab Units 04/14/20  1827 04/13/20  1746 04/13/20  1733   BLOOD CULTURE   --  No Growth at 72 hrs  No Growth at 72 hrs  C DIFF TOXIN B  Negative  --   --        Imaging Studies:   I have personally reviewed pertinent imaging study reports and images in PACS  EKG, Pathology, and Other Studies:   I have personally reviewed pertinent reports

## 2020-04-18 ENCOUNTER — APPOINTMENT (INPATIENT)
Dept: RADIOLOGY | Facility: HOSPITAL | Age: 50
DRG: 177 | End: 2020-04-18
Payer: MEDICARE

## 2020-04-18 PROBLEM — I26.99 PULMONARY EMBOLISM (HCC): Status: ACTIVE | Noted: 2020-04-18

## 2020-04-18 LAB
BACTERIA BLD CULT: NORMAL
BACTERIA BLD CULT: NORMAL

## 2020-04-18 PROCEDURE — 94760 N-INVAS EAR/PLS OXIMETRY 1: CPT

## 2020-04-18 PROCEDURE — 99233 SBSQ HOSP IP/OBS HIGH 50: CPT | Performed by: INTERNAL MEDICINE

## 2020-04-18 PROCEDURE — G0407 INPT/TELE FOLLOW UP 25: HCPCS | Performed by: INTERNAL MEDICINE

## 2020-04-18 PROCEDURE — 99232 SBSQ HOSP IP/OBS MODERATE 35: CPT | Performed by: INTERNAL MEDICINE

## 2020-04-18 PROCEDURE — 71275 CT ANGIOGRAPHY CHEST: CPT

## 2020-04-18 PROCEDURE — NC001 PR NO CHARGE: Performed by: FAMILY MEDICINE

## 2020-04-18 RX ADMIN — ENOXAPARIN SODIUM 100 MG: 100 INJECTION SUBCUTANEOUS at 09:27

## 2020-04-18 RX ADMIN — ZINC SULFATE 220 MG (50 MG) CAPSULE 220 MG: CAPSULE at 09:20

## 2020-04-18 RX ADMIN — IOHEXOL 85 ML: 350 INJECTION, SOLUTION INTRAVENOUS at 20:19

## 2020-04-18 RX ADMIN — Medication 250 MG: at 17:48

## 2020-04-18 RX ADMIN — PREDNISONE 60 MG: 20 TABLET ORAL at 09:19

## 2020-04-18 RX ADMIN — MELATONIN 2000 UNITS: at 09:19

## 2020-04-18 RX ADMIN — ATORVASTATIN CALCIUM 40 MG: 40 TABLET, FILM COATED ORAL at 21:06

## 2020-04-18 RX ADMIN — Medication 250 MG: at 09:19

## 2020-04-18 RX ADMIN — ENOXAPARIN SODIUM 100 MG: 100 INJECTION SUBCUTANEOUS at 21:06

## 2020-04-18 RX ADMIN — OXYCODONE HYDROCHLORIDE AND ACETAMINOPHEN 1000 MG: 500 TABLET ORAL at 17:48

## 2020-04-18 RX ADMIN — OXYCODONE HYDROCHLORIDE AND ACETAMINOPHEN 1000 MG: 500 TABLET ORAL at 09:19

## 2020-04-18 NOTE — ASSESSMENT & PLAN NOTE
Positive COVID-19 PCR  Severe disease due to viral pneumonia and and high oxygen requirement  With acute respiratory failure  Oxygen saturations are improving today  Continue prone positioning as tolerated  Continue hydroxychloroquine, vitamin C, Zinc, atorvastatin, vitamin D  Completed empiric antibiotics  Monitor Procalcitonin, ferritin, CRP, CBC, temps    Continue prednisone taper

## 2020-04-18 NOTE — PROGRESS NOTES
Progress Note - Infectious Disease   Nga Lim 44 y o  male MRN: [id number]  Unit/Bed#: CW2 214-01 Encounter: [id number]      Impression/Recommendations:  1   Severe COVID multifocal pneumonia   Unclear exposure history   Complicated by # 2   Procalcitonin 0 56 >> 0 23, ferritin 2650 >> 4594 >> 992,  >> 21 8, D-dimer 1 29   Status post 3 days IV ceftriaxone, azithromycin   Clinically stable without sepsis  Slowly improving  Rec:  ? Continue steroid taper  ? Continue Vit-C/Vit-D/zinc/atorvastatin  ? Continue therapeutic anticoagulation  ? Follow respiratory status closely  ? Provided encouragement     2   Acute hypoxic respiratory failure   Due to # 1   O2 requirement slowly improving  Rec:  ? Continue aCOVID management as above  ? Follow respiratory status closely     3   Acute transaminitis   Likely due to # 1   Denies abdominal pain   LFTs now improving      4   Diarrhea   Suspect due to # 1  C diff negative      5   MO    6  Anxiety      The above impression and plan was discussed in detail with the patient and Dr Cathi rTan  I will reassess the patient on Monday 4/20  Please call in the interim with new questions      Antibiotics:  (Ceftriaxone x3, Azithromycin x4)  (Hydroxychloroquine x5)  Steroids # 5  Vitamin-C/zinc/VitD/atorvastatin # 5  Lovenox #2    Subjective:  Patient seen on AM rounds  Patient very angry  Noted to be anxious by primary service earlier  Does not believe that he is getting better  Is frustrated that he remains in the hospital   Still having diarrhea  Denies fevers, chills, or sweats  24 Hour Events:  O2 down to 6 L  Continues to have documented loose stools  No documented fevers, chills, or sweats  No new labs today  Started on therapeutic dose of anticoagulation yesterday      Objective:  Vitals:  Temp:  [98 °F (36 7 °C)-98 4 °F (36 9 °C)] 98 4 °F (36 9 °C)  HR:  [54-91] 77  Resp:  [18-22] 19  BP: (123-128)/(71-77) 127/77  SpO2:  [90 %-99 %] 97 %  Temp (24hrs), Av 2 °F (36 8 °C), Min:98 °F (36 7 °C), Max:98 4 °F (36 9 °C)  Current: Temperature: 98 4 °F (36 9 °C)    Physical Exam:   General:  No acute distress  Psychiatric:  Awake and alert  Pulmonary:  Normal respiratory excursion without accessory muscle use  Abdomen:  Soft, nontender  Extremities:  No edema  Skin:  No rashes    Lab Results:  I have personally reviewed pertinent labs  Results from last 7 days   Lab Units 20  0443 20  1013 04/15/20  0611   POTASSIUM mmol/L 4 9 3 9 3 3*   CHLORIDE mmol/L 112* 110* 107   CO2 mmol/L 27 26 23   BUN mg/dL 22 23 21   CREATININE mg/dL 0 64 0 78 0 76   EGFR ml/min/1 73sq m 113 105 106   CALCIUM mg/dL 8 7 9 0 9 1   AST U/L 53* 83* 149*   ALT U/L 205* 241* 230*   ALK PHOS U/L 58 60 56     Results from last 7 days   Lab Units 20  1013 04/15/20  0611 20  0503   WBC Thousand/uL 13 85* 14 17* 8 79   HEMOGLOBIN g/dL 14 5 14 3 14 1   PLATELETS Thousands/uL 555* 465* 349     Results from last 7 days   Lab Units 20  1827 20  1746 20  1733   BLOOD CULTURE   --  No Growth After 4 Days  No Growth After 4 Days  C DIFF TOXIN B  Negative  --   --        Imaging Studies:   I have personally reviewed pertinent imaging study reports and images in PACS  EKG, Pathology, and Other Studies:   I have personally reviewed pertinent reports

## 2020-04-18 NOTE — PROGRESS NOTES
Progress Note - [address] 1970, 44 y o  male MRN: [id number]    Unit/Bed#: CW2 214-01 Encounter: [id number]    Primary Care Provider: Triston Hoyos MD   Date and time admitted to hospital: 4/13/2020  4:58 PM        * COVID-19 virus infection  Assessment & Plan  Positive COVID-19 PCR  Severe disease due to viral pneumonia and and high oxygen requirement  With acute respiratory failure  Oxygen saturations are improving today  Continue prone positioning as tolerated  Continue hydroxychloroquine, vitamin C, Zinc, atorvastatin, vitamin D  Completed empiric antibiotics  Monitor Procalcitonin, ferritin, CRP, CBC, temps  Continue prednisone taper    Pulmonary embolism ([facility] [address] )  Assessment & Plan  Suspected pulmonary embolism based on worsening saturations yesterday and elevated D-dimer  O2 saturations improved with addition of weight based Lovenox yesterday  Continue Lovenox at current dose  Likely need transition to p o  Anticoagulation when more clinically stable  Elevated liver enzymes  Assessment & Plan  Likely due to viral illness and/or atorvastatin use  LFTs improved    Diarrhea  Assessment & Plan  improved  Likely due to COVID-19  Stool for C diff was negative  Continue probiotic  Continue banatrol  Prn imodium    Vitamin D deficiency  Assessment & Plan  Continue vitamin D supplementation    Acute respiratory failure with hypoxia (HCC)  Assessment & Plan  As above        VTE Pharmacologic Prophylaxis:   Pharmacologic: Enoxaparin (Lovenox)  Mechanical VTE Prophylaxis in Place: Yes    Patient Centered Rounds: I have performed bedside rounds with nursing staff today  Discussions with Specialists or Other Care Team Provider:  Pulmonology, Infectious Disease    Education and Discussions with Family / Patient:  Patient and girlfriend, plan of care    Time Spent for Care: 20 minutes  More than 50% of total time spent on counseling and coordination of care as described above      Current Length of Stay: 5 day(s)    Current Patient Status: Inpatient   Certification Statement: The patient will continue to require additional inpatient hospital stay due to Hypoxia    Discharge Plan:  Transfer to [facility] under family practice service    Code Status: Level 1 - Full Code      Subjective:   Reports that his breathing continues to improve today  Objective:     Vitals:   Temp (24hrs), Av 2 °F (36 8 °C), Min:98 °F (36 7 °C), Max:98 4 °F (36 9 °C)    Temp:  [98 °F (36 7 °C)-98 4 °F (36 9 °C)] 98 4 °F (36 9 °C)  HR:  [54-91] 77  Resp:  [18-22] 19  BP: (123-128)/(71-77) 127/77  SpO2:  [90 %-99 %] 96 %  Body mass index is 33 92 kg/m²  Input and Output Summary (last 24 hours): Intake/Output Summary (Last 24 hours) at 2020 1317  Last data filed at 2020 1100  Gross per 24 hour   Intake 420 ml   Output 1175 ml   Net -755 ml       Physical Exam:  Telephone visit to conserve PPE  tPhysical Exam   Constitutional: He is oriented to person, place, and time  Pulmonary/Chest:   Respiratory effort sounds within normal limits   Neurological: He is alert and oriented to person, place, and time  Psychiatric: He has a normal mood and affect  His behavior is normal          Additional Data:     Labs:    Results from last 7 days   Lab Units 20  1013  20  0503  20  1746   WBC Thousand/uL 13 85*   < > 8 79  --  11 66*   HEMOGLOBIN g/dL 14 5   < > 14 1  --  14 8   HEMATOCRIT % 44 5   < > 43 6  --  44 8   PLATELETS Thousands/uL 555*   < > 349   < > 333   NEUTROS PCT %  --   --   --   --  89*   LYMPHS PCT %  --   --   --   --  7*   LYMPHO PCT %  --   --  0*  --   --    MONOS PCT %  --   --   --   --  3*   MONO PCT %  --   --  0*  --   --    EOS PCT %  --   --  0  --  0    < > = values in this interval not displayed       Results from last 7 days   Lab Units 20  0443   SODIUM mmol/L 143   POTASSIUM mmol/L 4 9   CHLORIDE mmol/L 112*   CO2 mmol/L 27   BUN mg/dL 22   CREATININE mg/dL 0 64 ANION GAP mmol/L 4   CALCIUM mg/dL 8 7   ALBUMIN g/dL 2 1*   TOTAL BILIRUBIN mg/dL 0 35   ALK PHOS U/L 58   ALT U/L 205*   AST U/L 53*   GLUCOSE RANDOM mg/dL 127     Results from last 7 days   Lab Units 04/14/20  0503   INR  1 21*             Results from last 7 days   Lab Units 04/15/20  0611 04/14/20  0503 04/13/20  1746   LACTIC ACID mmol/L  --   --  1 6   PROCALCITONIN ng/ml 0 23 0 40* 0 56*           * I Have Reviewed All Lab Data Listed Above  * Additional Pertinent Lab Tests Reviewed: All Labs Within Last 24 Hours Reviewed      Recent Cultures (last 7 days):     Results from last 7 days   Lab Units 04/14/20  1827 04/13/20  1746 04/13/20  1733   BLOOD CULTURE   --  No Growth After 4 Days  No Growth After 4 Days  C DIFF TOXIN B  Negative  --   --        Last 24 Hours Medication List:     Current Facility-Administered Medications:  ascorbic acid 1,000 mg Oral BID Flower Lincoln MD   atorvastatin 40 mg Oral HS Flower Lincoln MD   cholecalciferol 2,000 Units Oral Daily Leonel Sanchez MD   enoxaparin 1 mg/kg Subcutaneous Q12H Irl MD Xochilt   loperamide 2 mg Oral TID PRN Leonel Sanchez MD   LORazepam 0 5 mg Oral Q8H PRN MD Alla Mac ON 4/20/2020] predniSONE 50 mg Oral Daily Leonel Sanchez MD   Followed by       Alla Kyle ON 4/23/2020] predniSONE 40 mg Oral Daily Leonel Sanchez MD   Followed by       Alla Kyle ON 4/26/2020] predniSONE 30 mg Oral Daily Leonel Sanchze MD   Followed by       Alla Kyle ON 4/29/2020] predniSONE 20 mg Oral Daily Leonel Sanchez MD   Followed by       Alla Kyle ON 5/2/2020] predniSONE 10 mg Oral Daily Leonel Sanchez MD   predniSONE 60 mg Oral Daily Leonel Sanchez MD   saccharomyces boulardii 250 mg Oral BID Leonel Sanchez MD   zinc sulfate 220 mg Oral Daily Flower Lincoln MD        Today, Patient Was Seen By: Deondre Blanco MD    ** Please Note: Dictation voice to text software may have been used in the creation of this document   **

## 2020-04-18 NOTE — ASSESSMENT & PLAN NOTE
Clinical suspition of PE per previous team given elevated D-dimer and increased O2 requirement  CT PE study 4/18 showed no evidence of PE  -Weight based Lovenox discontinued  -started on DVT prophylaxis

## 2020-04-18 NOTE — PROGRESS NOTES
[facility] Acceptance Note  Cindy Lim 44 y o  male MRN: [id number]  Unit/Bed#: CW2 214-01 Encounter: [id number]    Date of Admission: 4/13/2020  4:58 PM  Date of Transfer: 4/18/2020    Summary:   Bianka Chang is a 44 y o  male with no relevant PMH who presented on 4/13 due to worsening SOBOE at home  He tested positive for COVID-19 on 4/06/20  On admission on patient was found to be in severe acute respiratory failure secondary to viral pneumonia needing HFNC at 15 L  Inflammatory markers (ferritin, CRP), along with D-dimer, procalcitonin, INR elevated on admission  He was transferred to [facility] level 1 for severe COVID-19 symptoms where he completed  IV ceftriaxone x3, azithromycin x4, hydroxychloroquine x5  Patient continues on steroid taper, vitamin-C, vitamin-D, zinc, atorvastatin  He developed diarrhea during hospital stay  C difficile negative likely secondary to COVID  Due to worsening saturations yesterday an elevated D-dimer on admission on-call physician started patient on weight based Lovenox for suspicion of PE on 04/17  Blood cultures so far negative after 4 days  Currently patient is hemodynamically stable, saturating well on 5 L of nasal cannula, afebrile for 5 days  Assessment and Plan:   Bianka Chang is a 44y o  year old male who is being transferred from [facility] level 1 ICU care with moderate COVID-19    * COVID-19 virus infection  Assessment & Plan  Pt is COVID positive since 04/06  Currently moderate stage  -CXR on admission: Patchy bilateral airspace disease could be indicative of multifocal pneumonia  -Patient completed azithromycin x4, hydroxychloroquine x5    -Currently saturating 95% on 5 L NC   Continue wean off O2 as tolerated   -Continue Vitamin D, vitamin C, Zinc, atorvastatin  -Continue steroid taper   -Pulm and ID following    Pulmonary embolism (HCC)  Assessment & Plan  Clinical suspition of PE per previous team given elevated D-dimer and increased O2 needs yesterday  -Weight based Lovenox Q 12 hrs started  -Will consider CT PE for transition of anticoagulation as outpatient if patient positive for PE    Elevated liver enzymes  Assessment & Plan  Improving  -Likely secondary to COVID  -Patient refusing lab work today  Will continue to monitor with daily CMP    Diarrhea  Assessment & Plan  C  Diff negative on 4/15  -Likely secondary to Ulisses      Patient Active Problem List   Diagnosis    Viral URI with cough    Fever    Exposure to SARS-associated coronavirus    COVID-19 virus infection    Acute respiratory failure with hypoxia ([address] )    Vitamin D deficiency    Diarrhea    Elevated liver enzymes    Pulmonary embolism (HCC)       Diet:       Diet Orders   (From admission, onward)             Start     Ordered    04/17/20 1156  Dietary nutrition supplements  Once     Question Answer Comment   Select Supplement: Banatrol Plus Banana Flakes    Mix with: Vanilla Pudding    Frequency Breakfast, Lunch, Dinner        04/17/20 1155    04/14/20 1057  Diet Regular; Regular House  Diet effective now     Question Answer Comment   Diet Type Regular    Regular Regular House    Special Instructions Disposable Meal    RD to adjust diet per protocol? Yes        04/14/20 1057               VTE PPX: SCDs, Lovenox  Dispo: Patient continues to require inpatient care  Subjective:    Review of Systems   Constitutional: Negative for fatigue and fever  HENT: Negative for congestion and sore throat  Eyes: Negative for visual disturbance  Respiratory: Negative for cough and shortness of breath  Gastrointestinal: Negative for abdominal pain, diarrhea, nausea and vomiting  Genitourinary: Negative for difficulty urinating  Musculoskeletal: Negative for myalgias  Neurological: Negative for headaches  Psychiatric/Behavioral: The patient is not nervous/anxious  Objective: Body mass index is 33 92 kg/m²    Vitals:    04/18/20 0753 04/18/20 1200 04/18/20 1331 20 1550   BP: 127/77   101/56   BP Location:       Pulse: 77   71   Resp: 19   19   Temp: 98 4 °F (36 9 °C)   97 9 °F (36 6 °C)   TempSrc:       SpO2: 97% 96% 95% 96%   Weight:       Height:         Temp:  [97 9 °F (36 6 °C)-98 4 °F (36 9 °C)] 97 9 °F (36 6 °C)  HR:  [62-91] 71  Resp:  [18-22] 19  BP: (101-128)/(56-77) 101/56  SpO2:  [90 %-97 %] 96 %  Temp (48hrs), Av °F (36 7 °C), Min:97 7 °F (36 5 °C), Max:98 4 °F (36 9 °C)  Current: Temperature: 97 9 °F (36 6 °C)    Intake/Output Summary (Last 24 hours) at 2020 1734  Last data filed at 2020 1100  Gross per 24 hour   Intake 420 ml   Output 1025 ml   Net -605 ml     Invasive Devices     Peripheral Intravenous Line            Peripheral IV 20 Right Forearm 4 days                Physical Exam   Constitutional: He appears well-developed  No distress  Nasal cannula in place  HENT:   Head: Normocephalic  Mouth/Throat: Oropharynx is clear and moist    Eyes: Conjunctivae are normal  Right eye exhibits no discharge  Left eye exhibits no discharge  Neck: Normal range of motion  Cardiovascular: Normal rate, regular rhythm and normal heart sounds  Exam reveals no friction rub  No murmur heard  Pulmonary/Chest: Effort normal and breath sounds normal  He has no wheezes  He has no rales  Abdominal: Soft  Bowel sounds are normal  He exhibits no distension  There is no tenderness  There is no guarding  Neurological: He is alert  Skin: He is not diaphoretic           Results from last 7 days   Lab Units 20  1013 04/15/20  0611 20  0503  20  1746   WBC Thousand/uL 13 85* 14 17* 8 79  --  11 66*   HEMOGLOBIN g/dL 14 5 14 3 14 1  --  14 8   HEMATOCRIT % 44 5 44 1 43 6  --  44 8   PLATELETS Thousands/uL 555* 465* 349   < > 333   NEUTROS PCT %  --   --   --   --  89*   NEUTROS ABS Thousands/µL  --   --   --   --  10 41*   LYMPHS PCT %  --   --   --   --  7*   LYMPHO PCT %  --   --  0*  --   --    MONOS PCT %  --   --   --   -- 3*   MONO PCT %  --   --  0*  --   --     < > = values in this interval not displayed  Results from last 7 days   Lab Units 04/17/20  0443 04/16/20  1013 04/15/20  0611   POTASSIUM mmol/L 4 9 3 9 3 3*   CHLORIDE mmol/L 112* 110* 107   CO2 mmol/L 27 26 23   BUN mg/dL 22 23 21   CREATININE mg/dL 0 64 0 78 0 76   EGFR ml/min/1 73sq m 113 105 106   CALCIUM mg/dL 8 7 9 0 9 1   ALK PHOS U/L 58 60 56   ALT U/L 205* 241* 230*   AST U/L 53* 83* 149*     Results from last 7 days   Lab Units 04/14/20  0503   MAGNESIUM mg/dL 2 7*     Results from last 7 days   Lab Units 04/15/20  0611 04/14/20  0503 04/13/20  1746   LACTIC ACID mmol/L  --   --  1 6   PROCALCITONIN ng/ml 0 23 0 40* 0 56*             Results from last 7 days   Lab Units 04/14/20  0503 04/13/20  1746   TROPONIN I ng/mL <0 02 <0 02     Results from last 7 days   Lab Units 04/14/20  0503 04/13/20  1746   NT-PRO BNP pg/mL 61 108      Results from last 7 days   Lab Units 04/14/20  0503 04/13/20  1747   INR  1 21* 1 21*   PTT seconds  --  32     Results from last 7 days   Lab Units 04/14/20  1827 04/13/20  1746 04/13/20  1733   BLOOD CULTURE   --  No Growth After 4 Days  No Growth After 4 Days  C DIFF TOXIN B  Negative  --   --      No results for input(s): Verlie Fothergill, SPECGRAV, PHUR, LEUKOCYTESUR, NITRITE, PROTEINUA, GLUCOSEU, KETONESU, Tommy Garbe in the last 72 hours  Invalid input(s): Phoebe Kareem  ABG:No results found for: PHART, OPX9GAK, PO2ART, EKA2LYE, A1MTEEBS, BEART, SOURCE    =================================================    Imagining Results:  Xr Chest 1 View Portable    Result Date: 4/13/2020  Patchy bilateral airspace disease could be indicative of multifocal pneumonia including viral etiologies   Workstation performed: [id number]         This case was discussed with Family Medicine Attending Physician Dr Candance Kitchens and [facility] team     Ana Gomez MD    ** Please Note: Portions of this note have been constructed using voice recognition software  Occasional wrong word or "sound a like" substitutions may have occurred due to the inherent limitations of voice recognition software  Please read the chart carefully and recognize, using context, where substitutions have occurred  **

## 2020-04-18 NOTE — PROGRESS NOTES
Progress Note - Pulmonary and [address] 44 y o  male MRN: [id number]  Unit/Bed#: [facility] 214-01 Encounter: [id number]    Consult requested location: Unit/Bed#: [place] [address]  Physician Requesting Consult: Shon Ramirez MD   Reason for Consult:  Acute hypoxic respiratory failure  COVID19 infection  Pneumonia      Assessment:  1  Acute hypoxic respiratory failure  2  COVID19 infection  3  Pneumonia      Plan:   Patient seems to be switched to prednisone 60 mg p o  Daily   Will continue to monitor oxygen saturation   Goal SpO2 of 92-96%   Continue Lovenox Q 12 as treatment dose   Patient was observed on 8 L by nasal cannula saturating in the low 90s   Patient was seen semi self proning   Of note patient seems somewhat depressed as well, but not explain why  I suspected due to his current infection   Plan discussed with primary team    Pertinent details:   Patient seems somewhat depressed today  He remains afebrile  His oxygen is improving and he is requiring less supplemental oxygen  He states that he still has a cough  It is not productive  He denies any nausea or vomiting  He does still complain of diarrhea  Review of Systems:  Twelve point review of systems was negative except for what is otherwise mentioned      Vital Signs:  Vitals:    04/18/20 0315 04/18/20 0454 04/18/20 0713 04/18/20 0753   BP:  128/75  127/77   BP Location:       Pulse:  77  77   Resp:  18  19   Temp:  98 1 °F (36 7 °C)  98 4 °F (36 9 °C)   TempSrc:       SpO2: 90% 91% 95% 97%   Weight:       Height:            Phyiscal Exam:  Gen: Awake, alert, oriented x 3, no acute distress  HEENT: Mucous membranes moist, no oral lesions, no thrush  NECK: No accessory muscle use, JVP not elevated  Cardiac: Regular, unable to auscultate heart due to PAPR  Lungs:  Patient is in no respiratory distress, no audible wheeze, Unable to auscultate lungs due to PAPR  Abdomen: soft nontender, nondistended, no rebound or rigidity, no guarding  Extremities: no cyanosis, no clubbing    Medications:    Current Facility-Administered Medications:     ascorbic acid (VITAMIN C) tablet 1,000 mg, 1,000 mg, Oral, BID, Marcy Dodd MD, 1,000 mg at 04/18/20 0919    atorvastatin (LIPITOR) tablet 40 mg, 40 mg, Oral, HS, Marcy Dodd MD, 40 mg at 04/17/20 2132    cholecalciferol (VITAMIN D3) tablet 2,000 Units, 2,000 Units, Oral, Daily, Kaylene Sicard, MD, 2,000 Units at 04/18/20 0919    enoxaparin (LOVENOX) subcutaneous injection 100 mg, 1 mg/kg, Subcutaneous, Q12H [facility], Kaylene Sicard, MD, 100 mg at 04/18/20 0472    loperamide (IMODIUM) capsule 2 mg, 2 mg, Oral, TID PRN, Kaylene Sicard, MD, 2 mg at 04/16/20 1336    LORazepam (ATIVAN) tablet 0 5 mg, 0 5 mg, Oral, Q8H PRN, Kaylene Sicard, MD  [facility]  [START ON 4/20/2020] predniSONE tablet 50 mg, 50 mg, Oral, Daily **FOLLOWED BY** [START ON 4/23/2020] predniSONE tablet 40 mg, 40 mg, Oral, Daily **FOLLOWED BY** [START ON 4/26/2020] predniSONE tablet 30 mg, 30 mg, Oral, Daily **FOLLOWED BY** [START ON 4/29/2020] predniSONE tablet 20 mg, 20 mg, Oral, Daily **FOLLOWED BY** [START ON 5/2/2020] predniSONE tablet 10 mg, 10 mg, Oral, Daily, Kaylene Sicard, MD    predniSONE tablet 60 mg, 60 mg, Oral, Daily, Kaylene Sicard, MD, 60 mg at 04/18/20 0919    saccharomyces boulardii (FLORASTOR) capsule 250 mg, 250 mg, Oral, BID, Kaylene Sicard, MD, 250 mg at 04/18/20 0919    zinc sulfate (ZINCATE) capsule 220 mg, 220 mg, Oral, Daily, Marcy Dodd MD, 220 mg at 04/18/20 0920    Labs: I personally viewed and interpreted the following laboratory studies:  Results from last 7 days   Lab Units 04/14/20  1827 04/13/20  1746 04/13/20  1733   BLOOD CULTURE   --  No Growth After 4 Days  No Growth After 4 Days  C DIFF TOXIN B  Negative  --   --          Imaging:  I personally viewed and interpreted relevant imaging studies:        This evaluation was performed remotely since pulmonology service was unavailable for bedside evaluation due to either positive for COVID19 infection or awaiting test results for COVID19 Infection  Personal interview of the patient was conducted via phone and physical examination has not been performed by me personally  Time spent for discussion with the requesting provider, review of the chart, evaluation of diagnostic data and review of the imaging personally was 25 minutes      Recommendations were personally discussed with primary attending physician  >50% of the time documented was direct discussion with the requesting provider    Prasanna Valdes MD

## 2020-04-18 NOTE — ASSESSMENT & PLAN NOTE
Pt is COVID positive since 04/06  Currently moderate stage  -CXR on admission: Patchy bilateral airspace disease could be indicative of multifocal pneumonia  -Patient completed azithromycin x4, hydroxychloroquine x5    -Currently saturating 95% on 5 L NC   Continue wean off O2 as tolerated   -Continue Vitamin D, vitamin C, Zinc, atorvastatin  -Continue steroid taper   -Pulm and ID following

## 2020-04-18 NOTE — ASSESSMENT & PLAN NOTE
Suspected pulmonary embolism based on worsening saturations yesterday and elevated D-dimer  O2 saturations improved with addition of weight based Lovenox yesterday  Continue Lovenox at current dose  Likely need transition to p o  Anticoagulation when more clinically stable

## 2020-04-18 NOTE — ASSESSMENT & PLAN NOTE
improved  Likely due to COVID-19  Stool for C diff was negative  Continue probiotic  Continue banatrol  Prn imodium

## 2020-04-18 NOTE — NURSING NOTE
Patient resting comfortably , no fever, denies respiratory distress, he is continue with mid flow on 8 l   saturating 93 %, will continue to montior

## 2020-04-19 LAB
ANION GAP SERPL CALCULATED.3IONS-SCNC: 6 MMOL/L (ref 4–13)
BASOPHILS # BLD AUTO: 0.03 THOUSANDS/ΜL (ref 0–0.1)
BASOPHILS NFR BLD AUTO: 0 % (ref 0–1)
BUN SERPL-MCNC: 20 MG/DL (ref 5–25)
CALCIUM SERPL-MCNC: 8.8 MG/DL (ref 8.3–10.1)
CHLORIDE SERPL-SCNC: 108 MMOL/L (ref 100–108)
CO2 SERPL-SCNC: 26 MMOL/L (ref 21–32)
CREAT SERPL-MCNC: 0.7 MG/DL (ref 0.6–1.3)
CRP SERPL QL: 18.9 MG/L
EOSINOPHIL # BLD AUTO: 0.11 THOUSAND/ΜL (ref 0–0.61)
EOSINOPHIL NFR BLD AUTO: 1 % (ref 0–6)
ERYTHROCYTE [DISTWIDTH] IN BLOOD BY AUTOMATED COUNT: 12.4 % (ref 11.6–15.1)
FERRITIN SERPL-MCNC: 830 NG/ML (ref 8–388)
GFR SERPL CREATININE-BSD FRML MDRD: 109 ML/MIN/1.73SQ M
GLUCOSE SERPL-MCNC: 134 MG/DL (ref 65–140)
HCT VFR BLD AUTO: 46.5 % (ref 36.5–49.3)
HGB BLD-MCNC: 15.1 G/DL (ref 12–17)
IMM GRANULOCYTES # BLD AUTO: 0.18 THOUSAND/UL (ref 0–0.2)
IMM GRANULOCYTES NFR BLD AUTO: 1 % (ref 0–2)
LDH SERPL-CCNC: 259 U/L (ref 81–234)
LYMPHOCYTES # BLD AUTO: 2.81 THOUSANDS/ΜL (ref 0.6–4.47)
LYMPHOCYTES NFR BLD AUTO: 19 % (ref 14–44)
MCH RBC QN AUTO: 28.3 PG (ref 26.8–34.3)
MCHC RBC AUTO-ENTMCNC: 32.5 G/DL (ref 31.4–37.4)
MCV RBC AUTO: 87 FL (ref 82–98)
MONOCYTES # BLD AUTO: 0.63 THOUSAND/ΜL (ref 0.17–1.22)
MONOCYTES NFR BLD AUTO: 4 % (ref 4–12)
NEUTROPHILS # BLD AUTO: 11.3 THOUSANDS/ΜL (ref 1.85–7.62)
NEUTS SEG NFR BLD AUTO: 75 % (ref 43–75)
NRBC BLD AUTO-RTO: 0 /100 WBCS
PLATELET # BLD AUTO: 660 THOUSANDS/UL (ref 149–390)
PMV BLD AUTO: 10.2 FL (ref 8.9–12.7)
POTASSIUM SERPL-SCNC: 3.8 MMOL/L (ref 3.5–5.3)
RBC # BLD AUTO: 5.34 MILLION/UL (ref 3.88–5.62)
SODIUM SERPL-SCNC: 140 MMOL/L (ref 136–145)
WBC # BLD AUTO: 15.06 THOUSAND/UL (ref 4.31–10.16)

## 2020-04-19 PROCEDURE — 86140 C-REACTIVE PROTEIN: CPT | Performed by: INTERNAL MEDICINE

## 2020-04-19 PROCEDURE — 85025 COMPLETE CBC W/AUTO DIFF WBC: CPT | Performed by: INTERNAL MEDICINE

## 2020-04-19 PROCEDURE — 80048 BASIC METABOLIC PNL TOTAL CA: CPT | Performed by: INTERNAL MEDICINE

## 2020-04-19 PROCEDURE — 99232 SBSQ HOSP IP/OBS MODERATE 35: CPT | Performed by: NURSE PRACTITIONER

## 2020-04-19 PROCEDURE — 82728 ASSAY OF FERRITIN: CPT | Performed by: INTERNAL MEDICINE

## 2020-04-19 PROCEDURE — 94760 N-INVAS EAR/PLS OXIMETRY 1: CPT

## 2020-04-19 PROCEDURE — 83615 LACTATE (LD) (LDH) ENZYME: CPT | Performed by: INTERNAL MEDICINE

## 2020-04-19 PROCEDURE — 99232 SBSQ HOSP IP/OBS MODERATE 35: CPT | Performed by: FAMILY MEDICINE

## 2020-04-19 PROCEDURE — 94003 VENT MGMT INPAT SUBQ DAY: CPT

## 2020-04-19 RX ADMIN — OXYCODONE HYDROCHLORIDE AND ACETAMINOPHEN 1000 MG: 500 TABLET ORAL at 08:14

## 2020-04-19 RX ADMIN — Medication 250 MG: at 08:15

## 2020-04-19 RX ADMIN — PREDNISONE 60 MG: 20 TABLET ORAL at 08:14

## 2020-04-19 RX ADMIN — ENOXAPARIN SODIUM 40 MG: 40 INJECTION SUBCUTANEOUS at 22:09

## 2020-04-19 RX ADMIN — MELATONIN 2000 UNITS: at 08:14

## 2020-04-19 RX ADMIN — ATORVASTATIN CALCIUM 40 MG: 40 TABLET, FILM COATED ORAL at 22:08

## 2020-04-19 RX ADMIN — ZINC SULFATE 220 MG (50 MG) CAPSULE 220 MG: CAPSULE at 08:15

## 2020-04-19 RX ADMIN — Medication 250 MG: at 17:12

## 2020-04-19 RX ADMIN — OXYCODONE HYDROCHLORIDE AND ACETAMINOPHEN 1000 MG: 500 TABLET ORAL at 17:12

## 2020-04-19 NOTE — PLAN OF CARE
Problem: PAIN - ADULT  Goal: Verbalizes/displays adequate comfort level or baseline comfort level  Description  Interventions:  - Encourage patient to monitor pain and request assistance  - Assess pain using appropriate pain scale  - Administer analgesics based on type and severity of pain and evaluate response  - Implement non-pharmacological measures as appropriate and evaluate response  - Consider cultural and social influences on pain and pain management  - Notify physician/advanced practitioner if interventions unsuccessful or patient reports new pain  Outcome: Progressing     Problem: INFECTION - ADULT  Goal: Absence or prevention of progression during hospitalization  Description  INTERVENTIONS:  - Assess and monitor for signs and symptoms of infection  - Monitor lab/diagnostic results  - Monitor all insertion sites, i e  indwelling lines, tubes, and drains  - Monitor endotracheal if appropriate and nasal secretions for changes in amount and color  - [place] appropriate cooling/warming therapies per order  - Administer medications as ordered  - Instruct and encourage patient and family to use good hand hygiene technique  - Identify and instruct in appropriate isolation precautions for identified infection/condition  Outcome: Progressing  Goal: Absence of fever/infection during neutropenic period  Description  INTERVENTIONS:  - Monitor WBC    Outcome: Progressing     Problem: SAFETY ADULT  Goal: Patient will remain free of falls  Description  INTERVENTIONS:  - Assess patient frequently for physical needs  -  Identify cognitive and physical deficits and behaviors that affect risk of falls    -  [place] fall precautions as indicated by assessment   - Educate patient/family on patient safety including physical limitations  - Instruct patient to call for assistance with activity based on assessment  - Modify environment to reduce risk of injury  - Consider OT/PT consult to assist with strengthening/mobility  Outcome: Progressing  Goal: Maintain or return to baseline ADL function  Description  INTERVENTIONS:  -  Assess patient's ability to carry out ADLs; assess patient's baseline for ADL function and identify physical deficits which impact ability to perform ADLs (bathing, care of mouth/teeth, toileting, grooming, dressing, etc )  - Assess/evaluate cause of self-care deficits   - Assess range of motion  - Assess patient's mobility; develop plan if impaired  - Assess patient's need for assistive devices and provide as appropriate  - Encourage maximum independence but intervene and supervise when necessary  - Involve family in performance of ADLs  - Assess for home care needs following discharge   - Consider OT consult to assist with ADL evaluation and planning for discharge  - Provide patient education as appropriate  Outcome: Progressing  Goal: Maintain or return mobility status to optimal level  Description  INTERVENTIONS:  - Assess patient's baseline mobility status (ambulation, transfers, stairs, etc )    - Identify cognitive and physical deficits and behaviors that affect mobility  - Identify mobility aids required to assist with transfers and/or ambulation (gait belt, sit-to-stand, lift, walker, cane, etc )  - [place] fall precautions as indicated by assessment  - Record patient progress and toleration of activity level on Mobility SBAR; progress patient to next Phase/Stage  - Instruct patient to call for assistance with activity based on assessment  - Consider rehabilitation consult to assist with strengthening/weightbearing, etc   Outcome: Progressing     Problem: DISCHARGE PLANNING  Goal: Discharge to home or other facility with appropriate resources  Description  INTERVENTIONS:  - Identify barriers to discharge w/patient and caregiver  - Arrange for needed discharge resources and transportation as appropriate  - Identify discharge learning needs (meds, wound care, etc )  - Arrange for interpretive services to assist at discharge as needed  - Refer to Case Management Department for coordinating discharge planning if the patient needs post-hospital services based on physician/advanced practitioner order or complex needs related to functional status, cognitive ability, or social support system  Outcome: Progressing     Problem: Knowledge Deficit  Goal: Patient/family/caregiver demonstrates understanding of disease process, treatment plan, medications, and discharge instructions  Description  Complete learning assessment and assess knowledge base  Interventions:  - Provide teaching at level of understanding  - Provide teaching via preferred learning methods  Outcome: Progressing     Problem: DISCHARGE PLANNING - CARE MANAGEMENT  Goal: Discharge to post-acute care or home with appropriate resources  Description  INTERVENTIONS:  - Conduct assessment to determine patient/family and health care team treatment goals, and need for post-acute services based on payer coverage, community resources, and patient preferences, and barriers to discharge  - Address psychosocial, clinical, and financial barriers to discharge as identified in assessment in conjunction with the patient/family and health care team  - Arrange appropriate level of post-acute services according to patients   needs and preference and payer coverage in collaboration with the physician and health care team  - Communicate with and update the patient/family, physician, and health care team regarding progress on the discharge plan  - Arrange appropriate transportation to post-acute venues  Outcome: Progressing     Problem: Nutrition/Hydration-ADULT  Goal: Nutrient/Hydration intake appropriate for improving, restoring or maintaining nutritional needs  Description  Monitor and assess patient's nutrition/hydration status for malnutrition  Collaborate with interdisciplinary team and initiate plan and interventions as ordered    Monitor patient's weight and dietary intake as ordered or per policy  Utilize nutrition screening tool and intervene as necessary  Determine patient's food preferences and provide high-protein, high-caloric foods as appropriate       INTERVENTIONS:  - Monitor oral intake, urinary output, labs, and treatment plans  - Assess nutrition and hydration status and recommend course of action  - Evaluate amount of meals eaten  - Assist patient with eating if necessary   - Allow adequate time for meals  - Recommend/ encourage appropriate diets, oral nutritional supplements, and vitamin/mineral supplements  - Order, calculate, and assess calorie counts as needed  - Recommend, monitor, and adjust tube feedings and TPN/PPN based on assessed needs  - Assess need for intravenous fluids  - Provide specific nutrition/hydration education as appropriate  - Include patient/family/caregiver in decisions related to nutrition  Outcome: Progressing     Problem: RESPIRATORY - ADULT  Goal: Achieves optimal ventilation and oxygenation  Description  INTERVENTIONS:  - Assess for changes in respiratory status  - Assess for changes in mentation and behavior  - Position to facilitate oxygenation and minimize respiratory effort  - Oxygen administered by appropriate delivery if ordered  - Initiate smoking cessation education as indicated  - Encourage broncho-pulmonary hygiene including cough, deep breathe, Incentive Spirometry  - Assess the need for suctioning and aspirate as needed  - Assess and instruct to report SOB or any respiratory difficulty  - Respiratory Therapy support as indicated  Outcome: Progressing     Problem: METABOLIC, FLUID AND ELECTROLYTES - ADULT  Goal: Electrolytes maintained within normal limits  Description  INTERVENTIONS:  - Monitor labs and assess patient for signs and symptoms of electrolyte imbalances  - Administer electrolyte replacement as ordered  - Monitor response to electrolyte replacements, including repeat lab results as appropriate  - Instruct patient on fluid and nutrition as appropriate  Outcome: Progressing  Goal: Fluid balance maintained  Description  INTERVENTIONS:  - Monitor labs   - Monitor I/O and WT  - Instruct patient on fluid and nutrition as appropriate  - Assess for signs & symptoms of volume excess or deficit  Outcome: Progressing  Goal: Glucose maintained within target range  Description  INTERVENTIONS:  - Monitor Blood Glucose as ordered  - Assess for signs and symptoms of hyperglycemia and hypoglycemia  - Administer ordered medications to maintain glucose within target range  - Assess nutritional intake and initiate nutrition service referral as needed  Outcome: Progressing     Problem: Potential for Falls  Goal: Patient will remain free of falls  Description  INTERVENTIONS:  - Assess patient frequently for physical needs  -  Identify cognitive and physical deficits and behaviors that affect risk of falls    -  [place] fall precautions as indicated by assessment   - Educate patient/family on patient safety including physical limitations  - Instruct patient to call for assistance with activity based on assessment  - Modify environment to reduce risk of injury  - Consider OT/PT consult to assist with strengthening/mobility  Outcome: Progressing     Problem: Prexisting or High Potential for Compromised Skin Integrity  Goal: Skin integrity is maintained or improved  Description  INTERVENTIONS:  - Identify patients at risk for skin breakdown  - Assess and monitor skin integrity  - Assess and monitor nutrition and hydration status  - Monitor labs   - Assess for incontinence   - Turn and reposition patient  - Assist with mobility/ambulation  - Relieve pressure over bony prominences  - Avoid friction and shearing  - Provide appropriate hygiene as needed including keeping skin clean and dry  - Evaluate need for skin moisturizer/barrier cream  - Collaborate with interdisciplinary team   - Patient/family teaching  - Consider wound care consult   Outcome: Progressing

## 2020-04-19 NOTE — PROGRESS NOTES
Progress Note - Pulmonary   Renee Lim 44 y o  male MRN: [id number]  [facility] Encounter: [id number]      Assessment/Plan:  1  Acute hypoxic respiratory failure-slow to improve  1  Oxygen demand is slowly decreasing from requiring 8 L nasal cannula yesterday to 5 L nasal cannula today  2  Continue titrate oxygen as needed maintain SpO2 greater than equal to 89%  3  Patient does report noting his oxygen levels decreasing into the 80s when he walks  4  Continue with aggressive pulmonary toilet:  Incentive spirometry, out of bed with increasing activity as tolerated  2  COVID-19 infection  1  Procalcitonin initially 0 56 and then decreased 2 2 3  2  Ferritin initially 2, 650 now decreased to 992  3  CRP initially to 34 now decreased to 21 8  4  D-dimer 1 29  5  Continue current steroid taper he is at 60 mg daily  6  Continue vitamin C/vitamin-D/Zinc and Lipitor  7  Continue Lovenox treatment dose b i d   8  Encouraged self-proning-reviewed with patient and he is agreeable   9  Infectious Disease following  3  Pneumonia  1  Completed 3 days of IV ceftriaxone and azithromycin        Subjective: Interview completed over the phone  He reports his shortness of breath is improving although he continues to have dyspnea when walking to the bathroom  His cough is improving and has no sputum  He denies:  Chest pain, pain inspiration, fevers, chills, night sweats or hemoptysis  He reports feeling more positive today concerning his progress  Objective:         Vitals: Blood pressure 117/75, pulse 86, temperature 98 3 °F (36 8 °C), resp  rate 22, height 5' 8" (1 727 m), weight 101 kg (222 lb 8 oz), SpO2 90 %  , 5LNC, Body mass index is 33 83 kg/m²  Intake/Output Summary (Last 24 hours) at 4/19/2020 0926  Last data filed at 4/18/2020 1100  Gross per 24 hour   Intake    Output 175 ml   Net -175 ml         Physical Exam  -deferred as interview was completed over the following    Patient without signs of conversational dyspnea or audible wheezing    Labs: I have personally reviewed pertinent lab results  , CBC:   Lab Results   Component Value Date    WBC 15 06 (H) 04/19/2020    HGB 15 1 04/19/2020    HCT 46 5 04/19/2020    MCV 87 04/19/2020     (H) 04/19/2020    MCH 28 3 04/19/2020    MCHC 32 5 04/19/2020    RDW 12 4 04/19/2020    MPV 10 2 04/19/2020    NRBC 0 04/19/2020   , CMP:   Lab Results   Component Value Date    SODIUM 140 04/19/2020    K 3 8 04/19/2020     04/19/2020    CO2 26 04/19/2020    BUN 20 04/19/2020    CREATININE 0 70 04/19/2020    CALCIUM 8 8 04/19/2020    EGFR 109 04/19/2020   , ferritin 992  CRP 21 8  Imaging and other studies: none      Gretchen Lawton Odor

## 2020-04-19 NOTE — PROGRESS NOTES
Progress Note - [address] 1970, 44 y o  male MRN: [id number]    Unit/Bed#: [facility] 805-01 Encounter: [id number]    Primary Care Provider: Krystina Jacinto MD   Date and time admitted to hospital: 4/13/2020  4:58 PM        * COVID-19 virus infection  Assessment & Plan  Pt is COVID positive since 04/06  Currently moderate stage  -CXR on admission: Patchy bilateral airspace disease could be indicative of multifocal pneumonia  -Patient completed azithromycin x4, hydroxychloroquine x5    -Currently saturating 95% on 5 L NC  Continue wean off O2 as tolerated   -Continue Vitamin D, vitamin C, Zinc, atorvastatin  -Continue steroid taper   -Pulm and ID following    Pulmonary embolism (HCC)  Assessment & Plan  Clinical suspition of PE per previous team given elevated D-dimer and increased O2 requirement  CT PE study 4/18 showed no evidence of PE  -Weight based Lovenox discontinued  -started on DVT prophylaxis    Elevated liver enzymes  Assessment & Plan  Improving  -Likely secondary to COVID  -Patient refusing lab work today  Will continue to monitor with daily CMP    Diarrhea  Assessment & Plan  C  Diff negative on 4/15  -Likely secondary to COVID          Diet:        Diet Orders   (From admission, onward)             Start     Ordered    04/17/20 1156  Dietary nutrition supplements  Once     Question Answer Comment   Select Supplement: Banatrol Plus Banana Flakes    Mix with: Vanilla Pudding    Frequency Breakfast, Lunch, Dinner        04/17/20 1155    04/14/20 1057  Diet Regular; Regular House  Diet effective now     Question Answer Comment   Diet Type Regular    Regular Regular House    Special Instructions Disposable Meal    RD to adjust diet per protocol? Yes        04/14/20 1057              DVT Ppx:  Lovenox  Code:  Full code  Disposition: This patient currently requires inpatient level care  Subjective:   Pt seen and examined at bedside  Reports he is feeling much better today   Denies SOB at rest but does admit to [address]  Otherwise denies CP, fever, chills, n/v  Tolerating diet  Discussed with overnight resident, nursing staff and [facility] hospital service team      Objective:     Vitals: Blood pressure 117/75, pulse 86, temperature 98 3 °F (36 8 °C), resp  rate 22, height 5' 8" (1 727 m), weight 101 kg (222 lb 8 oz), SpO2 90 %  ,Body mass index is 33 83 kg/m²  Intake/Output Summary (Last 24 hours) at 4/19/2020 0859  Last data filed at 4/18/2020 1100  Gross per 24 hour   Intake    Output 175 ml   Net -175 ml       Physical Exam:     Physical Exam   HENT:   Head: Normocephalic and atraumatic  Eyes: Conjunctivae are normal    Cardiovascular: Regular rhythm  Pulmonary/Chest: Effort normal  No respiratory distress  On  5L O2 saturating 89-90%    Abdominal: Soft  He exhibits no distension  There is no tenderness  Musculoskeletal: Normal range of motion  He exhibits no edema  No calf tenderness   Skin: Skin is warm and dry  Psychiatric: He has a normal mood and affect  Vitals reviewed  Invasive Devices     Peripheral Intravenous Line            Peripheral IV 04/18/20 Left;Ventral (anterior) Forearm less than 1 day                          Lab, Imaging and other studies: I have personally reviewed pertinent reports       Pertinent Lab Findings:   ·     ·   Recent Results (from the past 24 hour(s))   CBC and differential    Collection Time: 04/19/20  8:39 AM   Result Value Ref Range    WBC 15 06 (H) 4 31 - 10 16 Thousand/uL    RBC 5 34 3 88 - 5 62 Million/uL    Hemoglobin 15 1 12 0 - 17 0 g/dL    Hematocrit 46 5 36 5 - 49 3 %    MCV 87 82 - 98 fL    MCH 28 3 26 8 - 34 3 pg    MCHC 32 5 31 4 - 37 4 g/dL    RDW 12 4 11 6 - 15 1 %    MPV 10 2 8 9 - 12 7 fL    Platelets 059 (H) 978 - 390 Thousands/uL    nRBC 0 /100 WBCs    Neutrophils Relative 75 43 - 75 %    Immat GRANS % 1 0 - 2 %    Lymphocytes Relative 19 14 - 44 %    Monocytes Relative 4 4 - 12 %    Eosinophils Relative 1 0 - 6 %    Basophils Relative 0 0 - 1 %    Neutrophils Absolute 11 30 (H) 1 85 - 7 62 Thousands/µL    Immature Grans Absolute 0 18 0 00 - 0 20 Thousand/uL    Lymphocytes Absolute 2 81 0 60 - 4 47 Thousands/µL    Monocytes Absolute 0 63 0 17 - 1 22 Thousand/µL    Eosinophils Absolute 0 11 0 00 - 0 61 Thousand/µL    Basophils Absolute 0 03 0 00 - 0 10 Thousands/µL        Imaging:  PE study negative    VTE Pharmacologic Prophylaxis: Enoxaparin (Lovenox)  VTE Mechanical Prophylaxis: sequential compression device    Current Facility-Administered Medications   Medication Dose Route Frequency    ascorbic acid (VITAMIN C) tablet 1,000 mg  1,000 mg Oral BID    atorvastatin (LIPITOR) tablet 40 mg  40 mg Oral HS    cholecalciferol (VITAMIN D3) tablet 2,000 Units  2,000 Units Oral Daily    enoxaparin (LOVENOX) subcutaneous injection 40 mg  40 mg Subcutaneous Q24H PUJA    loperamide (IMODIUM) capsule 2 mg  2 mg Oral TID PRN    LORazepam (ATIVAN) tablet 0 5 mg  0 5 mg Oral Q8H PRN    [START ON 4/20/2020] predniSONE tablet 50 mg  50 mg Oral Daily    Followed by   Bita Arango ON 4/23/2020] predniSONE tablet 40 mg  40 mg Oral Daily    Followed by   Bita Arango ON 4/26/2020] predniSONE tablet 30 mg  30 mg Oral Daily    Followed by   Bita Arango ON 4/29/2020] predniSONE tablet 20 mg  20 mg Oral Daily    Followed by   Bita Arango ON 5/2/2020] predniSONE tablet 10 mg  10 mg Oral Daily    saccharomyces boulardii (FLORASTOR) capsule 250 mg  250 mg Oral BID    zinc sulfate (ZINCATE) capsule 220 mg  220 mg Oral Daily       Afshan Edmondson MD  [facility] Medicine PGY-2  4/19/2020  8:59 AM    Please be aware that this note contains text that was dictated and there may be errors pertaining to "sound-alike" words during the dictation process

## 2020-04-20 PROCEDURE — 99232 SBSQ HOSP IP/OBS MODERATE 35: CPT | Performed by: INTERNAL MEDICINE

## 2020-04-20 PROCEDURE — 99231 SBSQ HOSP IP/OBS SF/LOW 25: CPT | Performed by: INTERNAL MEDICINE

## 2020-04-20 PROCEDURE — 94760 N-INVAS EAR/PLS OXIMETRY 1: CPT

## 2020-04-20 PROCEDURE — 99232 SBSQ HOSP IP/OBS MODERATE 35: CPT | Performed by: FAMILY MEDICINE

## 2020-04-20 RX ADMIN — OXYCODONE HYDROCHLORIDE AND ACETAMINOPHEN 1000 MG: 500 TABLET ORAL at 08:10

## 2020-04-20 RX ADMIN — MELATONIN 2000 UNITS: at 08:09

## 2020-04-20 RX ADMIN — ZINC SULFATE 220 MG (50 MG) CAPSULE 220 MG: CAPSULE at 08:10

## 2020-04-20 RX ADMIN — PREDNISONE 50 MG: 20 TABLET ORAL at 08:09

## 2020-04-20 RX ADMIN — Medication 250 MG: at 18:16

## 2020-04-20 RX ADMIN — ENOXAPARIN SODIUM 40 MG: 40 INJECTION SUBCUTANEOUS at 20:17

## 2020-04-20 RX ADMIN — Medication 250 MG: at 08:10

## 2020-04-20 NOTE — RESPIRATORY THERAPY NOTE
Patient is asking to see Dr Annamaria Hernandez today regarding Dermatitis around her eye. Has been going on for a while [facility]  Would like call back soon her no is [phone] RT Protocol Note  Aldair Lim 44 y o  male MRN: [id number]  Unit/Bed#: [facility] 805-01 Encounter: [id number]    Assessment    Principal Problem:    COVID-19 virus infection  Active Problems:    Acute respiratory failure with hypoxia (HCC)    Vitamin D deficiency    Diarrhea    Elevated liver enzymes    Pulmonary embolism (HCC)      Home Pulmonary Medications:  reviewed       History reviewed  No pertinent past medical history    Social History     Socioeconomic History    Marital status: Single     Spouse name: None    Number of children: None    Years of education: None    Highest education level: None   Occupational History    None   Social Needs    Financial resource strain: Not very hard    Food insecurity:     Worry: Never true     Inability: Never true    Transportation needs:     Medical: No     Non-medical: No   Tobacco Use    Smoking status: Former Smoker     Types: Cigarettes    Smokeless tobacco: Never Used   Substance and Sexual Activity    Alcohol use: Yes     Frequency: 2-3 times a week     Drinks per session: 5 or 6    Drug use: Never    Sexual activity: Not Currently     Partners: Female   Lifestyle    Physical activity:     Days per week: None     Minutes per session: None    Stress: None   Relationships    Social connections:     Talks on phone: None     Gets together: None     Attends Hinduism service: None     Active member of club or organization: None     Attends meetings of clubs or organizations: None     Relationship status: None    Intimate partner violence:     Fear of current or ex partner: None     Emotionally abused: None     Physically abused: None     Forced sexual activity: None   Other Topics Concern    None   Social History Narrative    None       Subjective         Objective    Physical Exam:   Assessment Type: (P) Assess only  General Appearance: (P) Alert, Awake  Respiratory Pattern: (P) Normal  Chest Assessment: (P) Chest expansion symmetrical  Bilateral Breath Sounds: (P) Clear, Diminished    Vitals:  Blood pressure 129/76, pulse 79, temperature 98 °F (36 7 °C), resp  rate 18, height 5' 8" (1 727 m), weight 100 kg (220 lb 14 4 oz), SpO2 93 %  Imaging and other studies: I have personally reviewed pertinent reports  4lpm nasal cannula     Plan             Resp Comments: (P) Pt currenty not in acute resp distress or complaining of dypsnea  No longer on midflow nasal cannula  Will discontinue protocol and notify nurse to contact resp if there is any change on oxygen requirement

## 2020-04-20 NOTE — ASSESSMENT & PLAN NOTE
-Likely secondary to COVID  -Patient had been refusing lab work today   Will continue to monitor with daily CMP

## 2020-04-20 NOTE — PROGRESS NOTES
Progress Note - Infectious Disease   Juanita Lim 44 y o  male MRN: [id number]  Unit/Bed#: [facility] 805-01 Encounter: [id number]      Impression/Recommendations:  1   Severe COVID multifocal pneumonia   Unclear exposure history   Complicated by # 2   Status post antibiotics, antivirals, anti-inflammatories  Procalcitonin 0 56 >> 0 23   >> 21 8 > 18 9  Ferritin 2650 >> 4594 >> 992 > 830  D-dimer 1 29    Clinically stable without sepsis  Markedly improved  Rec:  ? Continue steroid taper per Pulmonary  ? Continue Vit-C/Vit-D/zinc/atorvastatin per protocol  ? Follow respiratory status closely     2   Acute hypoxic respiratory failure   Due to # 1   O2 requirement slowly improving  Rec:  ? Continue COVID management as above  ? Follow respiratory status closely     3   Acute transaminitis   Likely due to # 1   Denies abdominal pain   LFTs now improving      4   Diarrhea   Suspect due to # 1  C diff negative      5   MO     6  Anxiety      The patient is stable from an ID standpoint  We will sign off for now  Please call with new questions  COVID Tx:  Steroids # 7  Vitamin-C/zinc/VitD/atorvastatin    (Ceftriaxone x3, Azithromycin x4)  (Hydroxychloroquine x5)    Subjective:  Patient seen on AM rounds  Feeling much better  In good spirits  Denies fevers, chills, sweats, nausea, vomiting, or diarrhea  24 Hour Events:  Down to 3 L nasal cannula  No documented fevers, chills, sweats, nausea, vomiting, or diarrhea        Objective:  Vitals:  Temp:  [97 2 °F (36 2 °C)-98 5 °F (36 9 °C)] 97 2 °F (36 2 °C)  HR:  [69-88] 72  Resp:  [17-18] 18  BP: (111-129)/(62-76) 126/75  SpO2:  [90 %-95 %] 94 %  Temp (24hrs), Av °F (36 7 °C), Min:97 2 °F (36 2 °C), Max:98 5 °F (36 9 °C)  Current: Temperature: (!) 97 2 °F (36 2 °C)    Physical Exam:   General:  No acute distress  Psychiatric:  Awake and alert, smiling  Pulmonary:  Normal respiratory excursion without accessory muscle use, clear to auscultation bilaterally  Abdomen:  Soft, nontender  Extremities:  No edema  Skin:  No rashes    Lab Results:  I have personally reviewed pertinent labs  Results from last 7 days   Lab Units 04/19/20  0839 04/17/20  0443 04/16/20  1013 04/15/20  0611   POTASSIUM mmol/L 3 8 4 9 3 9 3 3*   CHLORIDE mmol/L 108 112* 110* 107   CO2 mmol/L 26 27 26 23   BUN mg/dL 20 22 23 21   CREATININE mg/dL 0 70 0 64 0 78 0 76   EGFR ml/min/1 73sq m 109 113 105 106   CALCIUM mg/dL 8 8 8 7 9 0 9 1   AST U/L  --  53* 83* 149*   ALT U/L  --  205* 241* 230*   ALK PHOS U/L  --  58 60 56     Results from last 7 days   Lab Units 04/19/20  0839 04/16/20  1013 04/15/20  0611   WBC Thousand/uL 15 06* 13 85* 14 17*   HEMOGLOBIN g/dL 15 1 14 5 14 3   PLATELETS Thousands/uL 660* 555* 465*     Results from last 7 days   Lab Units 04/14/20  1827 04/13/20  1746 04/13/20  1733   BLOOD CULTURE   --  No Growth After 5 Days  No Growth After 5 Days  C DIFF TOXIN B  Negative  --   --        Imaging Studies:   I have personally reviewed pertinent imaging study reports and images in PACS  CT chest 4/18 reviewed personally no PE     EKG, Pathology, and Other Studies:   I have personally reviewed pertinent reports

## 2020-04-20 NOTE — PROGRESS NOTES
Progress Note - [address] 1970, 44 y o  male MRN: [id number]    Unit/Bed#: [facility] 805-01 Encounter: [id number]    Primary Care Provider: Emmie Cuadra MD   Date and time admitted to hospital: 4/13/2020  4:58 PM        * COVID-19 virus infection  Assessment & Plan  Pt is COVID positive since 04/06  Currently moderate stage  -CXR on admission: Patchy bilateral airspace disease could be indicative of multifocal pneumonia  -Patient completed azithromycin x4, hydroxychloroquine x5    -Currently saturating 95% on 5 L NC  Continue wean off O2 as tolerated   -Continue Vitamin D, vitamin C, Zinc, atorvastatin  -Continue steroid taper   -Pulm and ID following    Pulmonary embolism (HCC)  Assessment & Plan  Clinical suspition of PE per previous team given elevated D-dimer and increased O2 requirement  CT PE study 4/18 showed no evidence of PE  -Weight based Lovenox discontinued  -started on DVT prophylaxis    Elevated liver enzymes  Assessment & Plan  -Likely secondary to [place]  -Patient had been refusing lab work today  Will continue to monitor with daily CMP    Diarrhea  Assessment & Plan  Resolved  C  Diff negative on 4/15  -Likely secondary to COVID            Diet:        Diet Orders   (From admission, onward)             Start     Ordered    04/17/20 1156  Dietary nutrition supplements  Once     Question Answer Comment   Select Supplement: Banatrol Plus Banana Flakes    Mix with: Vanilla Pudding    Frequency Breakfast, Lunch, Dinner        04/17/20 1155    04/14/20 1057  Diet Regular; Regular House  Diet effective now     Question Answer Comment   Diet Type Regular    Regular Regular House    Special Instructions Disposable Meal    RD to adjust diet per protocol? Yes        04/14/20 1057              DVT Ppx: Lovenox  Code:Full code  Disposition: This patient currently requires inpatient level care  Subjective: Informed by pt's nurse that he is doing OK  He is in no acute distress   He does not appear to be in distress with walking to the bathroom or having conversational dyspnea  Pt is on 5L with SPO2 T 95 %    Discussed with overnight resident, nursing staff and [facility] hospital service team      Objective:     Vitals: Blood pressure 104/72, pulse 99, temperature 97 9 °F (36 6 °C), resp  rate 18, height 5' 8" (1 727 m), weight 100 kg (220 lb 14 4 oz), SpO2 91 %  ,Body mass index is 33 59 kg/m²  Intake/Output Summary (Last 24 hours) at 4/20/2020 1227  Last data filed at 4/20/2020 0701  Gross per 24 hour   Intake 220 ml   Output 1500 ml   Net -1280 ml       Physical Exam: Per ID's note    Physical Exam   HENT:   Head: Normocephalic and atraumatic  Pulmonary/Chest: No respiratory distress  CTAB   Neurological: He is alert  Skin: No rash noted  Psychiatric: He has a normal mood and affect  Vitals reviewed  Invasive Devices     Peripheral Intravenous Line            Peripheral IV 04/18/20 Left;Ventral (anterior) Forearm 1 day                          Lab, Imaging and other studies: I have personally reviewed pertinent reports  Pertinent Lab Findings:   ·     ·   No results found for this or any previous visit (from the past 24 hour(s))       Imaging:  None    VTE Pharmacologic Prophylaxis: Heparin  VTE Mechanical Prophylaxis: sequential compression device    Current Facility-Administered Medications   Medication Dose Route Frequency    ascorbic acid (VITAMIN C) tablet 1,000 mg  1,000 mg Oral BID    atorvastatin (LIPITOR) tablet 40 mg  40 mg Oral HS    cholecalciferol (VITAMIN D3) tablet 2,000 Units  2,000 Units Oral Daily    enoxaparin (LOVENOX) subcutaneous injection 40 mg  40 mg Subcutaneous Q24H [facility]    loperamide (IMODIUM) capsule 2 mg  2 mg Oral TID PRN    LORazepam (ATIVAN) tablet 0 5 mg  0 5 mg Oral Q8H PRN    predniSONE tablet 50 mg  50 mg Oral Daily    Followed by   Devere Agent ON 4/23/2020] predniSONE tablet 40 mg  40 mg Oral Daily    Followed by   Devere Agent ON 4/26/2020] predniSONE tablet 30 mg  30 mg Oral Daily    Followed by   Ulysses Juárez ON 4/29/2020] predniSONE tablet 20 mg  20 mg Oral Daily    Followed by   Ulysses Juárez ON 5/2/2020] predniSONE tablet 10 mg  10 mg Oral Daily    saccharomyces boulardii (FLORASTOR) capsule 250 mg  250 mg Oral BID    zinc sulfate (ZINCATE) capsule 220 mg  220 mg Oral Daily       Evelia Monroe MD  [facility] Medicine PGY-2  4/20/2020  12:27 PM    Please be aware that this note contains text that was dictated and there may be errors pertaining to "sound-alike" words during the dictation process

## 2020-04-20 NOTE — PROGRESS NOTES
Progress Note - Pulmonary   Nehemias Lim 44 y o  male MRN: [id number]  [facility] Encounter: [id number]    Assessment:  1  Improving acute hypoxic respiratory failure secondary to COVID-19 infection  2  COVID-19 pneumonia, completed therapy protocol but still on corticosteroids    Plan:  · Continue oxygen and titrate for pulse ox more than 88%  · Encourage out of bed and ambulation  · Continue self proning and incentive spirometry  · Continue prednisone taper  · Stop vitamin C and zinc and Lipitor  · Stop trending inflammatory markers  · To consider discharge home if possible on oxygen  · Will sign off, please call with questions    ----------------------------------------------------------------------------------------------------------------------    HPI/Interval History:   I spoke to patient over the phone, he feels much better, improving shortness of breath, able to walk in the room without distress, still has cough but nonproductive, afebrile  Denies abdominal pain or nausea or vomiting  Denies headache  Vitals:   Blood pressure 104/72, pulse 99, temperature 97 9 °F (36 6 °C), resp  rate 18, height 5' 8" (1 727 m), weight 100 kg (220 lb 14 4 oz), SpO2 91 %  ,Body mass index is 33 59 kg/m²    SpO2: 91 %  SpO2 Activity: At Rest  O2 Device: Nasal cannula    Physical Exam:   I spoke to patient over the phone, he reports improvement, physical exam was deferred due to COVID-19 infection      Labs:    CBC:  Results from last 7 days   Lab Units 04/19/20  0839 04/16/20  1013 04/15/20  0611   WBC Thousand/uL 15 06* 13 85* 14 17*   HEMOGLOBIN g/dL 15 1 14 5 14 3   HEMATOCRIT % 46 5 44 5 44 1   PLATELETS Thousands/uL 660* 555* 465*       CMP:   Results from last 7 days   Lab Units 04/19/20  0839 04/17/20  0443 04/16/20  1013 04/15/20  0611   POTASSIUM mmol/L 3 8 4 9 3 9 3 3*   CHLORIDE mmol/L 108 112* 110* 107   CO2 mmol/L 26 27 26 23   BUN mg/dL 20 22 23 21   CREATININE mg/dL 0 70 0 64 0 78 0 76 CALCIUM mg/dL 8 8 8 7 9 0 9 1   ALK PHOS U/L  --  58 60 56   ALT U/L  --  205* 241* 230*   AST U/L  --  53* 83* 149*           Leonardo Contreras MD    Portions of the record may have been created with voice recognition software  Occasional wrong word or "sound a like" substitutions may have occurred due to the inherent limitations of voice recognition software  Read the chart carefully and recognize, using context, where substitutions have occurred

## 2020-04-20 NOTE — PLAN OF CARE
Problem: PAIN - ADULT  Goal: Verbalizes/displays adequate comfort level or baseline comfort level  Description  Interventions:  - Encourage patient to monitor pain and request assistance  - Assess pain using appropriate pain scale  - Administer analgesics based on type and severity of pain and evaluate response  - Implement non-pharmacological measures as appropriate and evaluate response  - Consider cultural and social influences on pain and pain management  - Notify physician/advanced practitioner if interventions unsuccessful or patient reports new pain  4/20/2020 0107 by Stacie Prieto RN  Outcome: Progressing  4/20/2020 0107 by Stacie Prieto RN  Outcome: Progressing     Problem: INFECTION - ADULT  Goal: Absence or prevention of progression during hospitalization  Description  INTERVENTIONS:  - Assess and monitor for signs and symptoms of infection  - Monitor lab/diagnostic results  - Monitor all insertion sites, i e  indwelling lines, tubes, and drains  - Monitor endotracheal if appropriate and nasal secretions for changes in amount and color  - [place] appropriate cooling/warming therapies per order  - Administer medications as ordered  - Instruct and encourage patient and family to use good hand hygiene technique  - Identify and instruct in appropriate isolation precautions for identified infection/condition  4/20/2020 0107 by Stacie Prieto RN  Outcome: Progressing  4/20/2020 0107 by Stacie Prieto RN  Outcome: Progressing  Goal: Absence of fever/infection during neutropenic period  Description  INTERVENTIONS:  - Monitor WBC    4/20/2020 0107 by Stacie Prieto RN  Outcome: Progressing  4/20/2020 0107 by Stacie Prieto RN  Outcome: Progressing     Problem: SAFETY ADULT  Goal: Patient will remain free of falls  Description  INTERVENTIONS:  - Assess patient frequently for physical needs  -  Identify cognitive and physical deficits and behaviors that affect risk of falls    -  [place] fall precautions as indicated by assessment   - Educate patient/family on patient safety including physical limitations  - Instruct patient to call for assistance with activity based on assessment  - Modify environment to reduce risk of injury  - Consider OT/PT consult to assist with strengthening/mobility  4/20/2020 0107 by Alda Gross RN  Outcome: Progressing  4/20/2020 0107 by Alda Gross RN  Outcome: Progressing  Goal: Maintain or return to baseline ADL function  Description  INTERVENTIONS:  -  Assess patient's ability to carry out ADLs; assess patient's baseline for ADL function and identify physical deficits which impact ability to perform ADLs (bathing, care of mouth/teeth, toileting, grooming, dressing, etc )  - Assess/evaluate cause of self-care deficits   - Assess range of motion  - Assess patient's mobility; develop plan if impaired  - Assess patient's need for assistive devices and provide as appropriate  - Encourage maximum independence but intervene and supervise when necessary  - Involve family in performance of ADLs  - Assess for home care needs following discharge   - Consider OT consult to assist with ADL evaluation and planning for discharge  - Provide patient education as appropriate  4/20/2020 0107 by Alda Gross RN  Outcome: Progressing  4/20/2020 0107 by Alda Gross RN  Outcome: Progressing  Goal: Maintain or return mobility status to optimal level  Description  INTERVENTIONS:  - Assess patient's baseline mobility status (ambulation, transfers, stairs, etc )    - Identify cognitive and physical deficits and behaviors that affect mobility  - Identify mobility aids required to assist with transfers and/or ambulation (gait belt, sit-to-stand, lift, walker, cane, etc )  - [place] fall precautions as indicated by assessment  - Record patient progress and toleration of activity level on Mobility SBAR; progress patient to next Phase/Stage  - Instruct patient to call for assistance with activity based on assessment  - Consider rehabilitation consult to assist with strengthening/weightbearing, etc   4/20/2020 0107 by Sina Perez RN  Outcome: Progressing  4/20/2020 0107 by Sina Perez RN  Outcome: Progressing     Problem: DISCHARGE PLANNING  Goal: Discharge to home or other facility with appropriate resources  Description  INTERVENTIONS:  - Identify barriers to discharge w/patient and caregiver  - Arrange for needed discharge resources and transportation as appropriate  - Identify discharge learning needs (meds, wound care, etc )  - Arrange for interpretive services to assist at discharge as needed  - Refer to Case Management Department for coordinating discharge planning if the patient needs post-hospital services based on physician/advanced practitioner order or complex needs related to functional status, cognitive ability, or social support system  4/20/2020 0107 by Sina Perez RN  Outcome: Progressing  4/20/2020 0107 by Sina Perez RN  Outcome: Progressing     Problem: Knowledge Deficit  Goal: Patient/family/caregiver demonstrates understanding of disease process, treatment plan, medications, and discharge instructions  Description  Complete learning assessment and assess knowledge base    Interventions:  - Provide teaching at level of understanding  - Provide teaching via preferred learning methods  4/20/2020 0107 by Sina Perez RN  Outcome: Progressing  4/20/2020 0107 by Sina Perez RN  Outcome: Progressing     Problem: DISCHARGE PLANNING - CARE MANAGEMENT  Goal: Discharge to post-acute care or home with appropriate resources  Description  INTERVENTIONS:  - Conduct assessment to determine patient/family and health care team treatment goals, and need for post-acute services based on payer coverage, community resources, and patient preferences, and barriers to discharge  - Address psychosocial, clinical, and financial barriers to discharge as identified in assessment in conjunction with the patient/family and health care team  - Arrange appropriate level of post-acute services according to patients   needs and preference and payer coverage in collaboration with the physician and health care team  - Communicate with and update the patient/family, physician, and health care team regarding progress on the discharge plan  - Arrange appropriate transportation to post-acute venues  4/20/2020 0107 by Ginger Arriola RN  Outcome: Progressing  4/20/2020 0107 by Ginger Arriola RN  Outcome: Progressing     Problem: Nutrition/Hydration-ADULT  Goal: Nutrient/Hydration intake appropriate for improving, restoring or maintaining nutritional needs  Description  Monitor and assess patient's nutrition/hydration status for malnutrition  Collaborate with interdisciplinary team and initiate plan and interventions as ordered  Monitor patient's weight and dietary intake as ordered or per policy  Utilize nutrition screening tool and intervene as necessary  Determine patient's food preferences and provide high-protein, high-caloric foods as appropriate       INTERVENTIONS:  - Monitor oral intake, urinary output, labs, and treatment plans  - Assess nutrition and hydration status and recommend course of action  - Evaluate amount of meals eaten  - Assist patient with eating if necessary   - Allow adequate time for meals  - Recommend/ encourage appropriate diets, oral nutritional supplements, and vitamin/mineral supplements  - Order, calculate, and assess calorie counts as needed  - Recommend, monitor, and adjust tube feedings and TPN/PPN based on assessed needs  - Assess need for intravenous fluids  - Provide specific nutrition/hydration education as appropriate  - Include patient/family/caregiver in decisions related to nutrition  4/20/2020 0107 by Ginger Arriola RN  Outcome: Progressing  4/20/2020 0107 by Ginger Arriola RN  Outcome: Progressing     Problem: RESPIRATORY - ADULT  Goal: Achieves optimal ventilation and oxygenation  Description  INTERVENTIONS:  - Assess for changes in respiratory status  - Assess for changes in mentation and behavior  - Position to facilitate oxygenation and minimize respiratory effort  - Oxygen administered by appropriate delivery if ordered  - Initiate smoking cessation education as indicated  - Encourage broncho-pulmonary hygiene including cough, deep breathe, Incentive Spirometry  - Assess the need for suctioning and aspirate as needed  - Assess and instruct to report SOB or any respiratory difficulty  - Respiratory Therapy support as indicated  4/20/2020 0107 by Linda Maher RN  Outcome: Progressing  4/20/2020 0107 by Linda Maher RN  Outcome: Progressing     Problem: METABOLIC, FLUID AND ELECTROLYTES - ADULT  Goal: Electrolytes maintained within normal limits  Description  INTERVENTIONS:  - Monitor labs and assess patient for signs and symptoms of electrolyte imbalances  - Administer electrolyte replacement as ordered  - Monitor response to electrolyte replacements, including repeat lab results as appropriate  - Instruct patient on fluid and nutrition as appropriate  4/20/2020 0107 by Linda Maher RN  Outcome: Progressing  4/20/2020 0107 by Linda Maher RN  Outcome: Progressing  Goal: Fluid balance maintained  Description  INTERVENTIONS:  - Monitor labs   - Monitor I/O and WT  - Instruct patient on fluid and nutrition as appropriate  - Assess for signs & symptoms of volume excess or deficit  4/20/2020 0107 by Linda Maher RN  Outcome: Progressing  4/20/2020 0107 by Linda Maher RN  Outcome: Progressing  Goal: Glucose maintained within target range  Description  INTERVENTIONS:  - Monitor Blood Glucose as ordered  - Assess for signs and symptoms of hyperglycemia and hypoglycemia  - Administer ordered medications to maintain glucose within target range  - Assess nutritional intake and initiate nutrition service referral as needed  4/20/2020 0107 by Linda Maher RN  Outcome: Progressing  4/20/2020 0107 by Fe Lopez RN  Outcome: Progressing     Problem: Potential for Falls  Goal: Patient will remain free of falls  Description  INTERVENTIONS:  - Assess patient frequently for physical needs  -  Identify cognitive and physical deficits and behaviors that affect risk of falls    -  [place] fall precautions as indicated by assessment   - Educate patient/family on patient safety including physical limitations  - Instruct patient to call for assistance with activity based on assessment  - Modify environment to reduce risk of injury  - Consider OT/PT consult to assist with strengthening/mobility  4/20/2020 0107 by Fe Lopez RN  Outcome: Progressing  4/20/2020 0107 by Fe Lopez RN  Outcome: Progressing     Problem: Prexisting or High Potential for Compromised Skin Integrity  Goal: Skin integrity is maintained or improved  Description  INTERVENTIONS:  - Identify patients at risk for skin breakdown  - Assess and monitor skin integrity  - Assess and monitor nutrition and hydration status  - Monitor labs   - Assess for incontinence   - Turn and reposition patient  - Assist with mobility/ambulation  - Relieve pressure over bony prominences  - Avoid friction and shearing  - Provide appropriate hygiene as needed including keeping skin clean and dry  - Evaluate need for skin moisturizer/barrier cream  - Collaborate with interdisciplinary team   - Patient/family teaching  - Consider wound care consult   4/20/2020 0107 by Fe Lopez RN  Outcome: Progressing  4/20/2020 0107 by Fe Lopez RN  Outcome: Progressing

## 2020-04-20 NOTE — PLAN OF CARE
Problem: PAIN - ADULT  Goal: Verbalizes/displays adequate comfort level or baseline comfort level  Description  Interventions:  - Encourage patient to monitor pain and request assistance  - Assess pain using appropriate pain scale  - Administer analgesics based on type and severity of pain and evaluate response  - Implement non-pharmacological measures as appropriate and evaluate response  - Consider cultural and social influences on pain and pain management  - Notify physician/advanced practitioner if interventions unsuccessful or patient reports new pain  Outcome: Progressing     Problem: INFECTION - ADULT  Goal: Absence or prevention of progression during hospitalization  Description  INTERVENTIONS:  - Assess and monitor for signs and symptoms of infection  - Monitor lab/diagnostic results  - Monitor all insertion sites, i e  indwelling lines, tubes, and drains  - Monitor endotracheal if appropriate and nasal secretions for changes in amount and color  - [place] appropriate cooling/warming therapies per order  - Administer medications as ordered  - Instruct and encourage patient and family to use good hand hygiene technique  - Identify and instruct in appropriate isolation precautions for identified infection/condition  Outcome: Progressing  Goal: Absence of fever/infection during neutropenic period  Description  INTERVENTIONS:  - Monitor WBC    Outcome: Progressing     Problem: SAFETY ADULT  Goal: Patient will remain free of falls  Description  INTERVENTIONS:  - Assess patient frequently for physical needs  -  Identify cognitive and physical deficits and behaviors that affect risk of falls    -  [place] fall precautions as indicated by assessment   - Educate patient/family on patient safety including physical limitations  - Instruct patient to call for assistance with activity based on assessment  - Modify environment to reduce risk of injury  - Consider OT/PT consult to assist with strengthening/mobility  Outcome: Progressing  Goal: Maintain or return to baseline ADL function  Description  INTERVENTIONS:  -  Assess patient's ability to carry out ADLs; assess patient's baseline for ADL function and identify physical deficits which impact ability to perform ADLs (bathing, care of mouth/teeth, toileting, grooming, dressing, etc )  - Assess/evaluate cause of self-care deficits   - Assess range of motion  - Assess patient's mobility; develop plan if impaired  - Assess patient's need for assistive devices and provide as appropriate  - Encourage maximum independence but intervene and supervise when necessary  - Involve family in performance of ADLs  - Assess for home care needs following discharge   - Consider OT consult to assist with ADL evaluation and planning for discharge  - Provide patient education as appropriate  Outcome: Progressing  Goal: Maintain or return mobility status to optimal level  Description  INTERVENTIONS:  - Assess patient's baseline mobility status (ambulation, transfers, stairs, etc )    - Identify cognitive and physical deficits and behaviors that affect mobility  - Identify mobility aids required to assist with transfers and/or ambulation (gait belt, sit-to-stand, lift, walker, cane, etc )  - [place] fall precautions as indicated by assessment  - Record patient progress and toleration of activity level on Mobility SBAR; progress patient to next Phase/Stage  - Instruct patient to call for assistance with activity based on assessment  - Consider rehabilitation consult to assist with strengthening/weightbearing, etc   Outcome: Progressing     Problem: DISCHARGE PLANNING  Goal: Discharge to home or other facility with appropriate resources  Description  INTERVENTIONS:  - Identify barriers to discharge w/patient and caregiver  - Arrange for needed discharge resources and transportation as appropriate  - Identify discharge learning needs (meds, wound care, etc )  - Arrange for interpretive services to assist at discharge as needed  - Refer to Case Management Department for coordinating discharge planning if the patient needs post-hospital services based on physician/advanced practitioner order or complex needs related to functional status, cognitive ability, or social support system  Outcome: Progressing     Problem: Knowledge Deficit  Goal: Patient/family/caregiver demonstrates understanding of disease process, treatment plan, medications, and discharge instructions  Description  Complete learning assessment and assess knowledge base  Interventions:  - Provide teaching at level of understanding  - Provide teaching via preferred learning methods  Outcome: Progressing     Problem: DISCHARGE PLANNING - CARE MANAGEMENT  Goal: Discharge to post-acute care or home with appropriate resources  Description  INTERVENTIONS:  - Conduct assessment to determine patient/family and health care team treatment goals, and need for post-acute services based on payer coverage, community resources, and patient preferences, and barriers to discharge  - Address psychosocial, clinical, and financial barriers to discharge as identified in assessment in conjunction with the patient/family and health care team  - Arrange appropriate level of post-acute services according to patients   needs and preference and payer coverage in collaboration with the physician and health care team  - Communicate with and update the patient/family, physician, and health care team regarding progress on the discharge plan  - Arrange appropriate transportation to post-acute venues  Outcome: Progressing     Problem: Nutrition/Hydration-ADULT  Goal: Nutrient/Hydration intake appropriate for improving, restoring or maintaining nutritional needs  Description  Monitor and assess patient's nutrition/hydration status for malnutrition  Collaborate with interdisciplinary team and initiate plan and interventions as ordered    Monitor patient's weight and dietary intake as ordered or per policy  Utilize nutrition screening tool and intervene as necessary  Determine patient's food preferences and provide high-protein, high-caloric foods as appropriate       INTERVENTIONS:  - Monitor oral intake, urinary output, labs, and treatment plans  - Assess nutrition and hydration status and recommend course of action  - Evaluate amount of meals eaten  - Assist patient with eating if necessary   - Allow adequate time for meals  - Recommend/ encourage appropriate diets, oral nutritional supplements, and vitamin/mineral supplements  - Order, calculate, and assess calorie counts as needed  - Recommend, monitor, and adjust tube feedings and TPN/PPN based on assessed needs  - Assess need for intravenous fluids  - Provide specific nutrition/hydration education as appropriate  - Include patient/family/caregiver in decisions related to nutrition  Outcome: Progressing     Problem: RESPIRATORY - ADULT  Goal: Achieves optimal ventilation and oxygenation  Description  INTERVENTIONS:  - Assess for changes in respiratory status  - Assess for changes in mentation and behavior  - Position to facilitate oxygenation and minimize respiratory effort  - Oxygen administered by appropriate delivery if ordered  - Initiate smoking cessation education as indicated  - Encourage broncho-pulmonary hygiene including cough, deep breathe, Incentive Spirometry  - Assess the need for suctioning and aspirate as needed  - Assess and instruct to report SOB or any respiratory difficulty  - Respiratory Therapy support as indicated  Outcome: Progressing     Problem: METABOLIC, FLUID AND ELECTROLYTES - ADULT  Goal: Electrolytes maintained within normal limits  Description  INTERVENTIONS:  - Monitor labs and assess patient for signs and symptoms of electrolyte imbalances  - Administer electrolyte replacement as ordered  - Monitor response to electrolyte replacements, including repeat lab results as appropriate  - Instruct patient on fluid and nutrition as appropriate  Outcome: Progressing  Goal: Fluid balance maintained  Description  INTERVENTIONS:  - Monitor labs   - Monitor I/O and WT  - Instruct patient on fluid and nutrition as appropriate  - Assess for signs & symptoms of volume excess or deficit  Outcome: Progressing  Goal: Glucose maintained within target range  Description  INTERVENTIONS:  - Monitor Blood Glucose as ordered  - Assess for signs and symptoms of hyperglycemia and hypoglycemia  - Administer ordered medications to maintain glucose within target range  - Assess nutritional intake and initiate nutrition service referral as needed  Outcome: Progressing     Problem: Potential for Falls  Goal: Patient will remain free of falls  Description  INTERVENTIONS:  - Assess patient frequently for physical needs  -  Identify cognitive and physical deficits and behaviors that affect risk of falls    -  [place] fall precautions as indicated by assessment   - Educate patient/family on patient safety including physical limitations  - Instruct patient to call for assistance with activity based on assessment  - Modify environment to reduce risk of injury  - Consider OT/PT consult to assist with strengthening/mobility  Outcome: Progressing     Problem: Prexisting or High Potential for Compromised Skin Integrity  Goal: Skin integrity is maintained or improved  Description  INTERVENTIONS:  - Identify patients at risk for skin breakdown  - Assess and monitor skin integrity  - Assess and monitor nutrition and hydration status  - Monitor labs   - Assess for incontinence   - Turn and reposition patient  - Assist with mobility/ambulation  - Relieve pressure over bony prominences  - Avoid friction and shearing  - Provide appropriate hygiene as needed including keeping skin clean and dry  - Evaluate need for skin moisturizer/barrier cream  - Collaborate with interdisciplinary team   - Patient/family teaching  - Consider wound care consult   Outcome: Progressing

## 2020-04-21 LAB
ALBUMIN SERPL BCP-MCNC: 2.3 G/DL (ref 3.5–5)
ALP SERPL-CCNC: 50 U/L (ref 46–116)
ALT SERPL W P-5'-P-CCNC: 243 U/L (ref 12–78)
ANION GAP SERPL CALCULATED.3IONS-SCNC: 6 MMOL/L (ref 4–13)
AST SERPL W P-5'-P-CCNC: 68 U/L (ref 5–45)
BASOPHILS # BLD AUTO: 0.03 THOUSANDS/ΜL (ref 0–0.1)
BASOPHILS NFR BLD AUTO: 0 % (ref 0–1)
BILIRUB SERPL-MCNC: 0.48 MG/DL (ref 0.2–1)
BUN SERPL-MCNC: 15 MG/DL (ref 5–25)
CALCIUM SERPL-MCNC: 8.7 MG/DL (ref 8.3–10.1)
CHLORIDE SERPL-SCNC: 107 MMOL/L (ref 100–108)
CO2 SERPL-SCNC: 26 MMOL/L (ref 21–32)
CREAT SERPL-MCNC: 0.6 MG/DL (ref 0.6–1.3)
EOSINOPHIL # BLD AUTO: 0.18 THOUSAND/ΜL (ref 0–0.61)
EOSINOPHIL NFR BLD AUTO: 1 % (ref 0–6)
ERYTHROCYTE [DISTWIDTH] IN BLOOD BY AUTOMATED COUNT: 12.5 % (ref 11.6–15.1)
GFR SERPL CREATININE-BSD FRML MDRD: 116 ML/MIN/1.73SQ M
GLUCOSE SERPL-MCNC: 90 MG/DL (ref 65–140)
HCT VFR BLD AUTO: 46.5 % (ref 36.5–49.3)
HGB BLD-MCNC: 14.8 G/DL (ref 12–17)
IMM GRANULOCYTES # BLD AUTO: 0.29 THOUSAND/UL (ref 0–0.2)
IMM GRANULOCYTES NFR BLD AUTO: 2 % (ref 0–2)
LYMPHOCYTES # BLD AUTO: 3.14 THOUSANDS/ΜL (ref 0.6–4.47)
LYMPHOCYTES NFR BLD AUTO: 21 % (ref 14–44)
MCH RBC QN AUTO: 28.1 PG (ref 26.8–34.3)
MCHC RBC AUTO-ENTMCNC: 31.8 G/DL (ref 31.4–37.4)
MCV RBC AUTO: 88 FL (ref 82–98)
MONOCYTES # BLD AUTO: 1.05 THOUSAND/ΜL (ref 0.17–1.22)
MONOCYTES NFR BLD AUTO: 7 % (ref 4–12)
NEUTROPHILS # BLD AUTO: 10.37 THOUSANDS/ΜL (ref 1.85–7.62)
NEUTS SEG NFR BLD AUTO: 69 % (ref 43–75)
NRBC BLD AUTO-RTO: 0 /100 WBCS
PLATELET # BLD AUTO: 651 THOUSANDS/UL (ref 149–390)
PMV BLD AUTO: 10 FL (ref 8.9–12.7)
POTASSIUM SERPL-SCNC: 4.4 MMOL/L (ref 3.5–5.3)
PROCALCITONIN SERPL-MCNC: <0.05 NG/ML
PROT SERPL-MCNC: 6.6 G/DL (ref 6.4–8.2)
RBC # BLD AUTO: 5.26 MILLION/UL (ref 3.88–5.62)
SODIUM SERPL-SCNC: 139 MMOL/L (ref 136–145)
WBC # BLD AUTO: 15.06 THOUSAND/UL (ref 4.31–10.16)

## 2020-04-21 PROCEDURE — 80053 COMPREHEN METABOLIC PANEL: CPT | Performed by: FAMILY MEDICINE

## 2020-04-21 PROCEDURE — 84145 PROCALCITONIN (PCT): CPT | Performed by: FAMILY MEDICINE

## 2020-04-21 PROCEDURE — 85025 COMPLETE CBC W/AUTO DIFF WBC: CPT | Performed by: FAMILY MEDICINE

## 2020-04-21 PROCEDURE — 99232 SBSQ HOSP IP/OBS MODERATE 35: CPT | Performed by: FAMILY MEDICINE

## 2020-04-21 RX ADMIN — Medication 250 MG: at 22:18

## 2020-04-21 RX ADMIN — Medication 250 MG: at 08:07

## 2020-04-21 RX ADMIN — ENOXAPARIN SODIUM 40 MG: 40 INJECTION SUBCUTANEOUS at 22:19

## 2020-04-21 RX ADMIN — PREDNISONE 50 MG: 20 TABLET ORAL at 08:07

## 2020-04-21 NOTE — PROGRESS NOTES
Progress Note - [address] 1970, 44 y o  male MRN: [id number]    Unit/Bed#: [facility] 805-01 Encounter: [id number]    Primary Care Provider: Sg Norris MD   Date and time admitted to hospital: 4/13/2020  4:58 PM        * COVID-19 virus infection  Assessment & Plan  Pt is COVID positive since 04/06  Currently moderate stage  -CXR on admission: Patchy bilateral airspace disease could be indicative of multifocal pneumonia  -Patient completed azithromycin x4, hydroxychloroquine x5    -Currently saturating  90-93% on 3-4 L NC  Continue wean off O2 as tolerated   -Continue steroid taper per Pulm  - Per Pulm:  · Continue self proning and incentive spirometry  · Continue prednisone taper  · Stop vitamin C and zinc and Lipitor  · Stop trending inflammatory markers  · To consider discharge home if possible on oxygen    Pulmonary embolism [facility])  Assessment & Plan  Clinical suspition of PE per previous team given elevated D-dimer and increased O2 requirement  CT PE study 4/18 showed no evidence of PE  -Weight based Lovenox discontinued  -started on DVT prophylaxis    Elevated liver enzymes  Assessment & Plan  - Improving  -Likely secondary to COVID  - Will continue to monitor     Diarrhea  Assessment & Plan  Resolved  C  Diff negative on 4/15  -Likely secondary to COVID            Diet:        Diet Orders   (From admission, onward)             Start     Ordered    04/17/20 1156  Dietary nutrition supplements  Once     Question Answer Comment   Select Supplement: Banatrol Plus Banana Flakes    Mix with: Vanilla Pudding    Frequency Breakfast, Lunch, Dinner        04/17/20 1155    04/14/20 1057  Diet Regular; Regular House  Diet effective now     Question Answer Comment   Diet Type Regular    Regular Regular House    Special Instructions Disposable Meal    RD to adjust diet per protocol? Yes        04/14/20 1057              DVT Ppx:LOvenox  Code: Full code  Disposition:  This patient currently requires inpatient level care  Anticipated discharge when weaned off O2  Subjective:   Pt seen and examined at bedside by attending  Pt is doing well  He is asymptomatic saturating low 90's on 4 L o2 via NC  He desaturated to 88-89% with sitting up in bed that improved to low 90's with deep breaths  Discussed with overnight resident, nursing staff and [facility] service team      Objective:     Vitals: Blood pressure 115/71, pulse 72, temperature 98 1 °F (36 7 °C), resp  rate 16, height 5' 8" (1 727 m), weight 99 7 kg (219 lb 14 4 oz), SpO2 93 %  ,Body mass index is 33 44 kg/m²  Intake/Output Summary (Last 24 hours) at 4/21/2020 1115  Last data filed at 4/21/2020 0900  Gross per 24 hour   Intake 840 ml   Output 1275 ml   Net -435 ml       Physical Exam:     Physical Exam   Constitutional:   Appears comfortable and in NAD   HENT:   Head: Normocephalic and atraumatic  Eyes: EOM are normal    Neck: Normal range of motion  Pulmonary/Chest: Effort normal  No respiratory distress  Musculoskeletal: Normal range of motion  Neurological: He is alert  Skin: Skin is warm and dry  Psychiatric: He has a normal mood and affect  Invasive Devices     Peripheral Intravenous Line            Peripheral IV 04/18/20 Left;Ventral (anterior) Forearm 2 days                          Lab, Imaging and other studies: I have personally reviewed pertinent reports       Pertinent Lab Findings:   ·     ·   Recent Results (from the past 24 hour(s))   CBC and differential    Collection Time: 04/21/20  5:05 AM   Result Value Ref Range    WBC 15 06 (H) 4 31 - 10 16 Thousand/uL    RBC 5 26 3 88 - 5 62 Million/uL    Hemoglobin 14 8 12 0 - 17 0 g/dL    Hematocrit 46 5 36 5 - 49 3 %    MCV 88 82 - 98 fL    MCH 28 1 26 8 - 34 3 pg    MCHC 31 8 31 4 - 37 4 g/dL    RDW 12 5 11 6 - 15 1 %    MPV 10 0 8 9 - 12 7 fL    Platelets 646 (H) 016 - 390 Thousands/uL    nRBC 0 /100 WBCs    Neutrophils Relative 69 43 - 75 %    Immat GRANS % 2 0 - 2 % Lymphocytes Relative 21 14 - 44 %    Monocytes Relative 7 4 - 12 %    Eosinophils Relative 1 0 - 6 %    Basophils Relative 0 0 - 1 %    Neutrophils Absolute 10 37 (H) 1 85 - 7 62 Thousands/µL    Immature Grans Absolute 0 29 (H) 0 00 - 0 20 Thousand/uL    Lymphocytes Absolute 3 14 0 60 - 4 47 Thousands/µL    Monocytes Absolute 1 05 0 17 - 1 22 Thousand/µL    Eosinophils Absolute 0 18 0 00 - 0 61 Thousand/µL    Basophils Absolute 0 03 0 00 - 0 10 Thousands/µL   Comprehensive metabolic panel    Collection Time: 04/21/20  5:05 AM   Result Value Ref Range    Sodium 139 136 - 145 mmol/L    Potassium 4 4 3 5 - 5 3 mmol/L    Chloride 107 100 - 108 mmol/L    CO2 26 21 - 32 mmol/L    ANION GAP 6 4 - 13 mmol/L    BUN 15 5 - 25 mg/dL    Creatinine 0 60 0 60 - 1 30 mg/dL    Glucose 90 65 - 140 mg/dL    Calcium 8 7 8 3 - 10 1 mg/dL    AST 68 (H) 5 - 45 U/L     (H) 12 - 78 U/L    Alkaline Phosphatase 50 46 - 116 U/L    Total Protein 6 6 6 4 - 8 2 g/dL    Albumin 2 3 (L) 3 5 - 5 0 g/dL    Total Bilirubin 0 48 0 20 - 1 00 mg/dL    eGFR 116 ml/min/1 73sq m   Procalcitonin    Collection Time: 04/21/20  5:05 AM   Result Value Ref Range    Procalcitonin <0 05 <=0 25 ng/ml        Imaging:  None    VTE Pharmacologic Prophylaxis: Enoxaparin (Lovenox)  VTE Mechanical Prophylaxis: sequential compression device    Current Facility-Administered Medications   Medication Dose Route Frequency    enoxaparin (LOVENOX) subcutaneous injection 40 mg  40 mg Subcutaneous Q24H PUJA    loperamide (IMODIUM) capsule 2 mg  2 mg Oral TID PRN    LORazepam (ATIVAN) tablet 0 5 mg  0 5 mg Oral Q8H PRN    predniSONE tablet 50 mg  50 mg Oral Daily    Followed by   Sammi Young ON 4/23/2020] predniSONE tablet 40 mg  40 mg Oral Daily    Followed by   Sammi Young ON 4/26/2020] predniSONE tablet 30 mg  30 mg Oral Daily    Followed by   Sammi Young ON 4/29/2020] predniSONE tablet 20 mg  20 mg Oral Daily    Followed by   Sammi Young ON 5/2/2020] predniSONE tablet 10 mg  10 mg Oral Daily    saccharomyces boulardii (FLORASTOR) capsule 250 mg  250 mg Oral BID       Belkis Gonsalez MD  [facility]  2  PGY-2  4/21/2020  11:15 AM    Please be aware that this note contains text that was dictated and there may be errors pertaining to "sound-alike" words during the dictation process

## 2020-04-21 NOTE — ASSESSMENT & PLAN NOTE
Pt is COVID positive since 04/06  Currently moderate stage  -CXR on admission: Patchy bilateral airspace disease could be indicative of multifocal pneumonia  -Patient completed azithromycin x4, hydroxychloroquine x5    -Currently saturating  90-93% on 3-4 L NC   Continue wean off O2 as tolerated   -Continue steroid taper per Pulm  - Per Pulm:  · Continue self proning and incentive spirometry  · Continue prednisone taper  · Stop vitamin C and zinc and Lipitor  · Stop trending inflammatory markers  · To consider discharge home if possible on oxygen

## 2020-04-21 NOTE — PLAN OF CARE
Problem: PAIN - ADULT  Goal: Verbalizes/displays adequate comfort level or baseline comfort level  Description  Interventions:  - Encourage patient to monitor pain and request assistance  - Assess pain using appropriate pain scale  - Administer analgesics based on type and severity of pain and evaluate response  - Implement non-pharmacological measures as appropriate and evaluate response  - Consider cultural and social influences on pain and pain management  - Notify physician/advanced practitioner if interventions unsuccessful or patient reports new pain  Outcome: Progressing     Problem: INFECTION - ADULT  Goal: Absence or prevention of progression during hospitalization  Description  INTERVENTIONS:  - Assess and monitor for signs and symptoms of infection  - Monitor lab/diagnostic results  - Monitor all insertion sites, i e  indwelling lines, tubes, and drains  - Monitor endotracheal if appropriate and nasal secretions for changes in amount and color  - [place] appropriate cooling/warming therapies per order  - Administer medications as ordered  - Instruct and encourage patient and family to use good hand hygiene technique  - Identify and instruct in appropriate isolation precautions for identified infection/condition  Outcome: Progressing  Goal: Absence of fever/infection during neutropenic period  Description  INTERVENTIONS:  - Monitor WBC    Outcome: Progressing     Problem: SAFETY ADULT  Goal: Patient will remain free of falls  Description  INTERVENTIONS:  - Assess patient frequently for physical needs  -  Identify cognitive and physical deficits and behaviors that affect risk of falls    -  [place] fall precautions as indicated by assessment   - Educate patient/family on patient safety including physical limitations  - Instruct patient to call for assistance with activity based on assessment  - Modify environment to reduce risk of injury  - Consider OT/PT consult to assist with strengthening/mobility  Outcome: Progressing  Goal: Maintain or return to baseline ADL function  Description  INTERVENTIONS:  -  Assess patient's ability to carry out ADLs; assess patient's baseline for ADL function and identify physical deficits which impact ability to perform ADLs (bathing, care of mouth/teeth, toileting, grooming, dressing, etc )  - Assess/evaluate cause of self-care deficits   - Assess range of motion  - Assess patient's mobility; develop plan if impaired  - Assess patient's need for assistive devices and provide as appropriate  - Encourage maximum independence but intervene and supervise when necessary  - Involve family in performance of ADLs  - Assess for home care needs following discharge   - Consider OT consult to assist with ADL evaluation and planning for discharge  - Provide patient education as appropriate  Outcome: Progressing  Goal: Maintain or return mobility status to optimal level  Description  INTERVENTIONS:  - Assess patient's baseline mobility status (ambulation, transfers, stairs, etc )    - Identify cognitive and physical deficits and behaviors that affect mobility  - Identify mobility aids required to assist with transfers and/or ambulation (gait belt, sit-to-stand, lift, walker, cane, etc )  - [place] fall precautions as indicated by assessment  - Record patient progress and toleration of activity level on Mobility SBAR; progress patient to next Phase/Stage  - Instruct patient to call for assistance with activity based on assessment  - Consider rehabilitation consult to assist with strengthening/weightbearing, etc   Outcome: Progressing     Problem: DISCHARGE PLANNING  Goal: Discharge to home or other facility with appropriate resources  Description  INTERVENTIONS:  - Identify barriers to discharge w/patient and caregiver  - Arrange for needed discharge resources and transportation as appropriate  - Identify discharge learning needs (meds, wound care, etc )  - Arrange for interpretive services to assist at discharge as needed  - Refer to Case Management Department for coordinating discharge planning if the patient needs post-hospital services based on physician/advanced practitioner order or complex needs related to functional status, cognitive ability, or social support system  Outcome: Progressing     Problem: Knowledge Deficit  Goal: Patient/family/caregiver demonstrates understanding of disease process, treatment plan, medications, and discharge instructions  Description  Complete learning assessment and assess knowledge base  Interventions:  - Provide teaching at level of understanding  - Provide teaching via preferred learning methods  Outcome: Progressing     Problem: DISCHARGE PLANNING - CARE MANAGEMENT  Goal: Discharge to post-acute care or home with appropriate resources  Description  INTERVENTIONS:  - Conduct assessment to determine patient/family and health care team treatment goals, and need for post-acute services based on payer coverage, community resources, and patient preferences, and barriers to discharge  - Address psychosocial, clinical, and financial barriers to discharge as identified in assessment in conjunction with the patient/family and health care team  - Arrange appropriate level of post-acute services according to patients   needs and preference and payer coverage in collaboration with the physician and health care team  - Communicate with and update the patient/family, physician, and health care team regarding progress on the discharge plan  - Arrange appropriate transportation to post-acute venues  Outcome: Progressing     Problem: Nutrition/Hydration-ADULT  Goal: Nutrient/Hydration intake appropriate for improving, restoring or maintaining nutritional needs  Description  Monitor and assess patient's nutrition/hydration status for malnutrition  Collaborate with interdisciplinary team and initiate plan and interventions as ordered    Monitor patient's weight and dietary intake as ordered or per policy  Utilize nutrition screening tool and intervene as necessary  Determine patient's food preferences and provide high-protein, high-caloric foods as appropriate       INTERVENTIONS:  - Monitor oral intake, urinary output, labs, and treatment plans  - Assess nutrition and hydration status and recommend course of action  - Evaluate amount of meals eaten  - Assist patient with eating if necessary   - Allow adequate time for meals  - Recommend/ encourage appropriate diets, oral nutritional supplements, and vitamin/mineral supplements  - Order, calculate, and assess calorie counts as needed  - Recommend, monitor, and adjust tube feedings and TPN/PPN based on assessed needs  - Assess need for intravenous fluids  - Provide specific nutrition/hydration education as appropriate  - Include patient/family/caregiver in decisions related to nutrition  Outcome: Progressing     Problem: RESPIRATORY - ADULT  Goal: Achieves optimal ventilation and oxygenation  Description  INTERVENTIONS:  - Assess for changes in respiratory status  - Assess for changes in mentation and behavior  - Position to facilitate oxygenation and minimize respiratory effort  - Oxygen administered by appropriate delivery if ordered  - Initiate smoking cessation education as indicated  - Encourage broncho-pulmonary hygiene including cough, deep breathe, Incentive Spirometry  - Assess the need for suctioning and aspirate as needed  - Assess and instruct to report SOB or any respiratory difficulty  - Respiratory Therapy support as indicated  Outcome: Progressing     Problem: METABOLIC, FLUID AND ELECTROLYTES - ADULT  Goal: Electrolytes maintained within normal limits  Description  INTERVENTIONS:  - Monitor labs and assess patient for signs and symptoms of electrolyte imbalances  - Administer electrolyte replacement as ordered  - Monitor response to electrolyte replacements, including repeat lab results as appropriate  - Instruct patient on fluid and nutrition as appropriate  Outcome: Progressing  Goal: Fluid balance maintained  Description  INTERVENTIONS:  - Monitor labs   - Monitor I/O and WT  - Instruct patient on fluid and nutrition as appropriate  - Assess for signs & symptoms of volume excess or deficit  Outcome: Progressing  Goal: Glucose maintained within target range  Description  INTERVENTIONS:  - Monitor Blood Glucose as ordered  - Assess for signs and symptoms of hyperglycemia and hypoglycemia  - Administer ordered medications to maintain glucose within target range  - Assess nutritional intake and initiate nutrition service referral as needed  Outcome: Progressing     Problem: Potential for Falls  Goal: Patient will remain free of falls  Description  INTERVENTIONS:  - Assess patient frequently for physical needs  -  Identify cognitive and physical deficits and behaviors that affect risk of falls    -  [place] fall precautions as indicated by assessment   - Educate patient/family on patient safety including physical limitations  - Instruct patient to call for assistance with activity based on assessment  - Modify environment to reduce risk of injury  - Consider OT/PT consult to assist with strengthening/mobility  Outcome: Progressing     Problem: Prexisting or High Potential for Compromised Skin Integrity  Goal: Skin integrity is maintained or improved  Description  INTERVENTIONS:  - Identify patients at risk for skin breakdown  - Assess and monitor skin integrity  - Assess and monitor nutrition and hydration status  - Monitor labs   - Assess for incontinence   - Turn and reposition patient  - Assist with mobility/ambulation  - Relieve pressure over bony prominences  - Avoid friction and shearing  - Provide appropriate hygiene as needed including keeping skin clean and dry  - Evaluate need for skin moisturizer/barrier cream  - Collaborate with interdisciplinary team   - Patient/family teaching  - Consider wound care consult   Outcome: Progressing

## 2020-04-21 NOTE — PLAN OF CARE
Problem: PAIN - ADULT  Goal: Verbalizes/displays adequate comfort level or baseline comfort level  Description  Interventions:  - Encourage patient to monitor pain and request assistance  - Assess pain using appropriate pain scale  - Administer analgesics based on type and severity of pain and evaluate response  - Implement non-pharmacological measures as appropriate and evaluate response  - Consider cultural and social influences on pain and pain management  - Notify physician/advanced practitioner if interventions unsuccessful or patient reports new pain  Outcome: Progressing     Problem: INFECTION - ADULT  Goal: Absence or prevention of progression during hospitalization  Description  INTERVENTIONS:  - Assess and monitor for signs and symptoms of infection  - Monitor lab/diagnostic results  - Monitor all insertion sites, i e  indwelling lines, tubes, and drains  - Monitor endotracheal if appropriate and nasal secretions for changes in amount and color  - [place] appropriate cooling/warming therapies per order  - Administer medications as ordered  - Instruct and encourage patient and family to use good hand hygiene technique  - Identify and instruct in appropriate isolation precautions for identified infection/condition  Outcome: Progressing  Goal: Absence of fever/infection during neutropenic period  Description  INTERVENTIONS:  - Monitor WBC    Outcome: Progressing     Problem: SAFETY ADULT  Goal: Patient will remain free of falls  Description  INTERVENTIONS:  - Assess patient frequently for physical needs  -  Identify cognitive and physical deficits and behaviors that affect risk of falls    -  [place] fall precautions as indicated by assessment   - Educate patient/family on patient safety including physical limitations  - Instruct patient to call for assistance with activity based on assessment  - Modify environment to reduce risk of injury  - Consider OT/PT consult to assist with strengthening/mobility  Outcome: Progressing  Goal: Maintain or return to baseline ADL function  Description  INTERVENTIONS:  -  Assess patient's ability to carry out ADLs; assess patient's baseline for ADL function and identify physical deficits which impact ability to perform ADLs (bathing, care of mouth/teeth, toileting, grooming, dressing, etc )  - Assess/evaluate cause of self-care deficits   - Assess range of motion  - Assess patient's mobility; develop plan if impaired  - Assess patient's need for assistive devices and provide as appropriate  - Encourage maximum independence but intervene and supervise when necessary  - Involve family in performance of ADLs  - Assess for home care needs following discharge   - Consider OT consult to assist with ADL evaluation and planning for discharge  - Provide patient education as appropriate  Outcome: Progressing  Goal: Maintain or return mobility status to optimal level  Description  INTERVENTIONS:  - Assess patient's baseline mobility status (ambulation, transfers, stairs, etc )    - Identify cognitive and physical deficits and behaviors that affect mobility  - Identify mobility aids required to assist with transfers and/or ambulation (gait belt, sit-to-stand, lift, walker, cane, etc )  - [place] fall precautions as indicated by assessment  - Record patient progress and toleration of activity level on Mobility SBAR; progress patient to next Phase/Stage  - Instruct patient to call for assistance with activity based on assessment  - Consider rehabilitation consult to assist with strengthening/weightbearing, etc   Outcome: Progressing     Problem: DISCHARGE PLANNING  Goal: Discharge to home or other facility with appropriate resources  Description  INTERVENTIONS:  - Identify barriers to discharge w/patient and caregiver  - Arrange for needed discharge resources and transportation as appropriate  - Identify discharge learning needs (meds, wound care, etc )  - Arrange for interpretive services to assist at discharge as needed  - Refer to Case Management Department for coordinating discharge planning if the patient needs post-hospital services based on physician/advanced practitioner order or complex needs related to functional status, cognitive ability, or social support system  Outcome: Progressing     Problem: Knowledge Deficit  Goal: Patient/family/caregiver demonstrates understanding of disease process, treatment plan, medications, and discharge instructions  Description  Complete learning assessment and assess knowledge base  Interventions:  - Provide teaching at level of understanding  - Provide teaching via preferred learning methods  Outcome: Progressing     Problem: DISCHARGE PLANNING - CARE MANAGEMENT  Goal: Discharge to post-acute care or home with appropriate resources  Description  INTERVENTIONS:  - Conduct assessment to determine patient/family and health care team treatment goals, and need for post-acute services based on payer coverage, community resources, and patient preferences, and barriers to discharge  - Address psychosocial, clinical, and financial barriers to discharge as identified in assessment in conjunction with the patient/family and health care team  - Arrange appropriate level of post-acute services according to patients   needs and preference and payer coverage in collaboration with the physician and health care team  - Communicate with and update the patient/family, physician, and health care team regarding progress on the discharge plan  - Arrange appropriate transportation to post-acute venues  Outcome: Progressing     Problem: Nutrition/Hydration-ADULT  Goal: Nutrient/Hydration intake appropriate for improving, restoring or maintaining nutritional needs  Description  Monitor and assess patient's nutrition/hydration status for malnutrition  Collaborate with interdisciplinary team and initiate plan and interventions as ordered    Monitor patient's weight and dietary intake as ordered or per policy  Utilize nutrition screening tool and intervene as necessary  Determine patient's food preferences and provide high-protein, high-caloric foods as appropriate       INTERVENTIONS:  - Monitor oral intake, urinary output, labs, and treatment plans  - Assess nutrition and hydration status and recommend course of action  - Evaluate amount of meals eaten  - Assist patient with eating if necessary   - Allow adequate time for meals  - Recommend/ encourage appropriate diets, oral nutritional supplements, and vitamin/mineral supplements  - Order, calculate, and assess calorie counts as needed  - Recommend, monitor, and adjust tube feedings and TPN/PPN based on assessed needs  - Assess need for intravenous fluids  - Provide specific nutrition/hydration education as appropriate  - Include patient/family/caregiver in decisions related to nutrition  Outcome: Progressing     Problem: RESPIRATORY - ADULT  Goal: Achieves optimal ventilation and oxygenation  Description  INTERVENTIONS:  - Assess for changes in respiratory status  - Assess for changes in mentation and behavior  - Position to facilitate oxygenation and minimize respiratory effort  - Oxygen administered by appropriate delivery if ordered  - Initiate smoking cessation education as indicated  - Encourage broncho-pulmonary hygiene including cough, deep breathe, Incentive Spirometry  - Assess the need for suctioning and aspirate as needed  - Assess and instruct to report SOB or any respiratory difficulty  - Respiratory Therapy support as indicated  Outcome: Progressing     Problem: METABOLIC, FLUID AND ELECTROLYTES - ADULT  Goal: Electrolytes maintained within normal limits  Description  INTERVENTIONS:  - Monitor labs and assess patient for signs and symptoms of electrolyte imbalances  - Administer electrolyte replacement as ordered  - Monitor response to electrolyte replacements, including repeat lab results as appropriate  - Instruct patient on fluid and nutrition as appropriate  Outcome: Progressing  Goal: Fluid balance maintained  Description  INTERVENTIONS:  - Monitor labs   - Monitor I/O and WT  - Instruct patient on fluid and nutrition as appropriate  - Assess for signs & symptoms of volume excess or deficit  Outcome: Progressing  Goal: Glucose maintained within target range  Description  INTERVENTIONS:  - Monitor Blood Glucose as ordered  - Assess for signs and symptoms of hyperglycemia and hypoglycemia  - Administer ordered medications to maintain glucose within target range  - Assess nutritional intake and initiate nutrition service referral as needed  Outcome: Progressing     Problem: Potential for Falls  Goal: Patient will remain free of falls  Description  INTERVENTIONS:  - Assess patient frequently for physical needs  -  Identify cognitive and physical deficits and behaviors that affect risk of falls    -  [place] fall precautions as indicated by assessment   - Educate patient/family on patient safety including physical limitations  - Instruct patient to call for assistance with activity based on assessment  - Modify environment to reduce risk of injury  - Consider OT/PT consult to assist with strengthening/mobility  Outcome: Progressing     Problem: Prexisting or High Potential for Compromised Skin Integrity  Goal: Skin integrity is maintained or improved  Description  INTERVENTIONS:  - Identify patients at risk for skin breakdown  - Assess and monitor skin integrity  - Assess and monitor nutrition and hydration status  - Monitor labs   - Assess for incontinence   - Turn and reposition patient  - Assist with mobility/ambulation  - Relieve pressure over bony prominences  - Avoid friction and shearing  - Provide appropriate hygiene as needed including keeping skin clean and dry  - Evaluate need for skin moisturizer/barrier cream  - Collaborate with interdisciplinary team   - Patient/family teaching  - Consider wound care consult   Outcome: Progressing

## 2020-04-22 LAB
ALBUMIN SERPL BCP-MCNC: 2.5 G/DL (ref 3.5–5)
ALP SERPL-CCNC: 55 U/L (ref 46–116)
ALT SERPL W P-5'-P-CCNC: 360 U/L (ref 12–78)
ANION GAP SERPL CALCULATED.3IONS-SCNC: 4 MMOL/L (ref 4–13)
AST SERPL W P-5'-P-CCNC: 74 U/L (ref 5–45)
BASOPHILS # BLD AUTO: 0.04 THOUSANDS/ΜL (ref 0–0.1)
BASOPHILS NFR BLD AUTO: 0 % (ref 0–1)
BILIRUB SERPL-MCNC: 0.41 MG/DL (ref 0.2–1)
BUN SERPL-MCNC: 15 MG/DL (ref 5–25)
CALCIUM SERPL-MCNC: 9 MG/DL (ref 8.3–10.1)
CHLORIDE SERPL-SCNC: 105 MMOL/L (ref 100–108)
CO2 SERPL-SCNC: 29 MMOL/L (ref 21–32)
CREAT SERPL-MCNC: 0.76 MG/DL (ref 0.6–1.3)
EOSINOPHIL # BLD AUTO: 0.01 THOUSAND/ΜL (ref 0–0.61)
EOSINOPHIL NFR BLD AUTO: 0 % (ref 0–6)
ERYTHROCYTE [DISTWIDTH] IN BLOOD BY AUTOMATED COUNT: 12.5 % (ref 11.6–15.1)
GFR SERPL CREATININE-BSD FRML MDRD: 106 ML/MIN/1.73SQ M
GLUCOSE SERPL-MCNC: 107 MG/DL (ref 65–140)
HCT VFR BLD AUTO: 47.9 % (ref 36.5–49.3)
HGB BLD-MCNC: 15.7 G/DL (ref 12–17)
IMM GRANULOCYTES # BLD AUTO: 0.37 THOUSAND/UL (ref 0–0.2)
IMM GRANULOCYTES NFR BLD AUTO: 2 % (ref 0–2)
LYMPHOCYTES # BLD AUTO: 1.56 THOUSANDS/ΜL (ref 0.6–4.47)
LYMPHOCYTES NFR BLD AUTO: 10 % (ref 14–44)
MCH RBC QN AUTO: 28.8 PG (ref 26.8–34.3)
MCHC RBC AUTO-ENTMCNC: 32.8 G/DL (ref 31.4–37.4)
MCV RBC AUTO: 88 FL (ref 82–98)
MONOCYTES # BLD AUTO: 0.82 THOUSAND/ΜL (ref 0.17–1.22)
MONOCYTES NFR BLD AUTO: 5 % (ref 4–12)
NEUTROPHILS # BLD AUTO: 13.36 THOUSANDS/ΜL (ref 1.85–7.62)
NEUTS SEG NFR BLD AUTO: 83 % (ref 43–75)
NRBC BLD AUTO-RTO: 0 /100 WBCS
PLATELET # BLD AUTO: 713 THOUSANDS/UL (ref 149–390)
PMV BLD AUTO: 9.8 FL (ref 8.9–12.7)
POTASSIUM SERPL-SCNC: 4.2 MMOL/L (ref 3.5–5.3)
PROT SERPL-MCNC: 6.9 G/DL (ref 6.4–8.2)
RBC # BLD AUTO: 5.46 MILLION/UL (ref 3.88–5.62)
SODIUM SERPL-SCNC: 138 MMOL/L (ref 136–145)
WBC # BLD AUTO: 16.16 THOUSAND/UL (ref 4.31–10.16)

## 2020-04-22 PROCEDURE — NC001 PR NO CHARGE: Performed by: FAMILY MEDICINE

## 2020-04-22 PROCEDURE — 99232 SBSQ HOSP IP/OBS MODERATE 35: CPT | Performed by: FAMILY MEDICINE

## 2020-04-22 PROCEDURE — 85025 COMPLETE CBC W/AUTO DIFF WBC: CPT | Performed by: FAMILY MEDICINE

## 2020-04-22 PROCEDURE — 80053 COMPREHEN METABOLIC PANEL: CPT | Performed by: FAMILY MEDICINE

## 2020-04-22 RX ADMIN — Medication 250 MG: at 08:07

## 2020-04-22 RX ADMIN — PREDNISONE 50 MG: 20 TABLET ORAL at 08:07

## 2020-04-22 RX ADMIN — ENOXAPARIN SODIUM 40 MG: 40 INJECTION SUBCUTANEOUS at 20:39

## 2020-04-22 RX ADMIN — Medication 250 MG: at 17:09

## 2020-04-22 NOTE — ASSESSMENT & PLAN NOTE
Pt is COVID positive since 04/06   Currently moderate stage  -CXR on admission: Patchy bilateral airspace disease could be indicative of multifocal pneumonia  -Patient completed azithromycin x4, hydroxychloroquine x5    -Currently saturating  90-93% on RA    -Continue steroid taper per Pulm  - Per Pulm:  · Continue self proning and incentive spirometry  · Continue prednisone taper  · Stop vitamin C and zinc and Lipitor  · Stop trending inflammatory markers  · To consider discharge home if possible on oxygen

## 2020-04-22 NOTE — PROGRESS NOTES
Progress Note - [address] 1970, 44 y o  male MRN: [id number]    Unit/Bed#: [facility] 805-01 Encounter: [id number]    Primary Care Provider: Kevon Meyers MD   Date and time admitted to hospital: 4/13/2020  4:58 PM        * COVID-19 virus infection  Assessment & Plan  Pt is COVID positive since 04/06  Currently moderate stage  -CXR on admission: Patchy bilateral airspace disease could be indicative of multifocal pneumonia  -Patient completed azithromycin x4, hydroxychloroquine x5    -Currently saturating  90-93% on RA    -Continue steroid taper per Pulm  - Per Pulm:  · Continue self proning and incentive spirometry  · Continue prednisone taper  · Stop vitamin C and zinc and Lipitor  · Stop trending inflammatory markers  · To consider discharge home if possible on oxygen    Pulmonary embolism ([facility] [address] )  Assessment & Plan  On admission, there was clinical suspition of PE per previous team given elevated D-dimer and increased O2 requirement  CT PE study 4/18 showed no evidence of PE  -Weight based Lovenox discontinued  -started on DVT prophylaxis    Elevated liver enzymes  Assessment & Plan  -Likely secondary to COVID  - Will continue to monitor     Diarrhea  Assessment & Plan  Resolved   C  Diff negative on 4/15  -Likely secondary to COVID            Diet:        Diet Orders   (From admission, onward)             Start     Ordered    04/17/20 1156  Dietary nutrition supplements  Once     Question Answer Comment   Select Supplement: Banatrol Plus Banana Flakes    Mix with: Vanilla Pudding    Frequency Breakfast, Lunch, Dinner        04/17/20 1155    04/14/20 1057  Diet Regular; Regular House  Diet effective now     Question Answer Comment   Diet Type Regular    Regular Regular House    Special Instructions Disposable Meal    RD to adjust diet per protocol? Yes        04/14/20 1057              DVT Ppx: Lovenox  Code: Full code  Disposition: This patient currently requires inpatient level care   Anticipated discharge later today or tomorrow if successfully weaned off O2  Subjective:   Patient seen and examined at bedside by attending  Reported to have desaturated to 83% while standing and brushing his teeth  Nursing reports patient appeared comfortable, will occasionally desaturate to mid 80s with ambulation  Otherwise, not in acute respiratory distress, denies chest pain shortness of breath, fever, chills, nausea and vomiting  Discussed with overnight resident, nursing staff and [facility] service team      Objective:     Vitals: Blood pressure 127/74, pulse 89, temperature 97 8 °F (36 6 °C), resp  rate 15, height 5' 8" (1 727 m), weight 100 kg (220 lb 14 4 oz), SpO2 93 %  ,Body mass index is 33 59 kg/m²  Intake/Output Summary (Last 24 hours) at 4/22/2020 0900  Last data filed at 4/22/2020 0630  Gross per 24 hour   Intake 240 ml   Output 675 ml   Net -435 ml       Physical Exam:     Physical Exam   Constitutional: He appears well-developed and well-nourished  HENT:   Head: Normocephalic and atraumatic  Eyes: EOM are normal    Neck: Normal range of motion  Cardiovascular: Regular rhythm  Pulmonary/Chest:   Breath sounds decreased bilaterally   Musculoskeletal: Normal range of motion  Neurological: He is alert  Skin: Skin is warm and dry  Psychiatric: He has a normal mood and affect  Vitals reviewed  Invasive Devices     Peripheral Intravenous Line            Peripheral IV 04/18/20 Left;Ventral (anterior) Forearm 3 days                          Lab, Imaging and other studies: I have personally reviewed pertinent reports       Pertinent Lab Findings:   ·     ·   Recent Results (from the past 24 hour(s))   Comprehensive metabolic panel    Collection Time: 04/22/20  5:15 AM   Result Value Ref Range    Sodium 138 136 - 145 mmol/L    Potassium 4 2 3 5 - 5 3 mmol/L    Chloride 105 100 - 108 mmol/L    CO2 29 21 - 32 mmol/L    ANION GAP 4 4 - 13 mmol/L    BUN 15 5 - 25 mg/dL    Creatinine 0  76 0 60 - 1 30 mg/dL    Glucose 107 65 - 140 mg/dL    Calcium 9 0 8 3 - 10 1 mg/dL    AST 74 (H) 5 - 45 U/L     (H) 12 - 78 U/L    Alkaline Phosphatase 55 46 - 116 U/L    Total Protein 6 9 6 4 - 8 2 g/dL    Albumin 2 5 (L) 3 5 - 5 0 g/dL    Total Bilirubin 0 41 0 20 - 1 00 mg/dL    eGFR 106 ml/min/1 73sq m   CBC and differential    Collection Time: 04/22/20  5:15 AM   Result Value Ref Range    WBC 16 16 (H) 4 31 - 10 16 Thousand/uL    RBC 5 46 3 88 - 5 62 Million/uL    Hemoglobin 15 7 12 0 - 17 0 g/dL    Hematocrit 47 9 36 5 - 49 3 %    MCV 88 82 - 98 fL    MCH 28 8 26 8 - 34 3 pg    MCHC 32 8 31 4 - 37 4 g/dL    RDW 12 5 11 6 - 15 1 %    MPV 9 8 8 9 - 12 7 fL    Platelets 012 (H) 560 - 390 Thousands/uL    nRBC 0 /100 WBCs    Neutrophils Relative 83 (H) 43 - 75 %    Immat GRANS % 2 0 - 2 %    Lymphocytes Relative 10 (L) 14 - 44 %    Monocytes Relative 5 4 - 12 %    Eosinophils Relative 0 0 - 6 %    Basophils Relative 0 0 - 1 %    Neutrophils Absolute 13 36 (H) 1 85 - 7 62 Thousands/µL    Immature Grans Absolute 0 37 (H) 0 00 - 0 20 Thousand/uL    Lymphocytes Absolute 1 56 0 60 - 4 47 Thousands/µL    Monocytes Absolute 0 82 0 17 - 1 22 Thousand/µL    Eosinophils Absolute 0 01 0 00 - 0 61 Thousand/µL    Basophils Absolute 0 04 0 00 - 0 10 Thousands/µL        Imaging:  None    VTE Pharmacologic Prophylaxis: Enoxaparin (Lovenox)  VTE Mechanical Prophylaxis: sequential compression device    Current Facility-Administered Medications   Medication Dose Route Frequency    enoxaparin (LOVENOX) subcutaneous injection 40 mg  40 mg Subcutaneous Q24H PUJA    loperamide (IMODIUM) capsule 2 mg  2 mg Oral TID PRN    LORazepam (ATIVAN) tablet 0 5 mg  0 5 mg Oral Q8H PRN    [START ON 4/23/2020] predniSONE tablet 40 mg  40 mg Oral Daily    Followed by   Myron Kothari ON 4/26/2020] predniSONE tablet 30 mg  30 mg Oral Daily    Followed by   Myron Kothari ON 4/29/2020] predniSONE tablet 20 mg  20 mg Oral Daily    Followed by   Myron Kothari ON 5/2/2020] predniSONE tablet 10 mg  10 mg Oral Daily    saccharomyces boulardii (FLORASTOR) capsule 250 mg  250 mg Oral BID       Britni Gomez MD  [facility]  2  PGY-2  4/22/2020  9:00 AM    Please be aware that this note contains text that was dictated and there may be errors pertaining to "sound-alike" words during the dictation process

## 2020-04-22 NOTE — PLAN OF CARE
Problem: PAIN - ADULT  Goal: Verbalizes/displays adequate comfort level or baseline comfort level  Description  Interventions:  - Encourage patient to monitor pain and request assistance  - Assess pain using appropriate pain scale  - Administer analgesics based on type and severity of pain and evaluate response  - Implement non-pharmacological measures as appropriate and evaluate response  - Consider cultural and social influences on pain and pain management  - Notify physician/advanced practitioner if interventions unsuccessful or patient reports new pain  Outcome: Progressing     Problem: INFECTION - ADULT  Goal: Absence or prevention of progression during hospitalization  Description  INTERVENTIONS:  - Assess and monitor for signs and symptoms of infection  - Monitor lab/diagnostic results  - Monitor all insertion sites, i e  indwelling lines, tubes, and drains  - Monitor endotracheal if appropriate and nasal secretions for changes in amount and color  - [place] appropriate cooling/warming therapies per order  - Administer medications as ordered  - Instruct and encourage patient and family to use good hand hygiene technique  - Identify and instruct in appropriate isolation precautions for identified infection/condition  Outcome: Progressing  Goal: Absence of fever/infection during neutropenic period  Description  INTERVENTIONS:  - Monitor WBC    Outcome: Progressing     Problem: SAFETY ADULT  Goal: Patient will remain free of falls  Description  INTERVENTIONS:  - Assess patient frequently for physical needs  -  Identify cognitive and physical deficits and behaviors that affect risk of falls    -  [place] fall precautions as indicated by assessment   - Educate patient/family on patient safety including physical limitations  - Instruct patient to call for assistance with activity based on assessment  - Modify environment to reduce risk of injury  - Consider OT/PT consult to assist with strengthening/mobility  Outcome: Progressing  Goal: Maintain or return to baseline ADL function  Description  INTERVENTIONS:  -  Assess patient's ability to carry out ADLs; assess patient's baseline for ADL function and identify physical deficits which impact ability to perform ADLs (bathing, care of mouth/teeth, toileting, grooming, dressing, etc )  - Assess/evaluate cause of self-care deficits   - Assess range of motion  - Assess patient's mobility; develop plan if impaired  - Assess patient's need for assistive devices and provide as appropriate  - Encourage maximum independence but intervene and supervise when necessary  - Involve family in performance of ADLs  - Assess for home care needs following discharge   - Consider OT consult to assist with ADL evaluation and planning for discharge  - Provide patient education as appropriate  Outcome: Progressing  Goal: Maintain or return mobility status to optimal level  Description  INTERVENTIONS:  - Assess patient's baseline mobility status (ambulation, transfers, stairs, etc )    - Identify cognitive and physical deficits and behaviors that affect mobility  - Identify mobility aids required to assist with transfers and/or ambulation (gait belt, sit-to-stand, lift, walker, cane, etc )  - [place] fall precautions as indicated by assessment  - Record patient progress and toleration of activity level on Mobility SBAR; progress patient to next Phase/Stage  - Instruct patient to call for assistance with activity based on assessment  - Consider rehabilitation consult to assist with strengthening/weightbearing, etc   Outcome: Progressing     Problem: DISCHARGE PLANNING  Goal: Discharge to home or other facility with appropriate resources  Description  INTERVENTIONS:  - Identify barriers to discharge w/patient and caregiver  - Arrange for needed discharge resources and transportation as appropriate  - Identify discharge learning needs (meds, wound care, etc )  - Arrange for interpretive services to assist at discharge as needed  - Refer to Case Management Department for coordinating discharge planning if the patient needs post-hospital services based on physician/advanced practitioner order or complex needs related to functional status, cognitive ability, or social support system  Outcome: Progressing     Problem: Knowledge Deficit  Goal: Patient/family/caregiver demonstrates understanding of disease process, treatment plan, medications, and discharge instructions  Description  Complete learning assessment and assess knowledge base  Interventions:  - Provide teaching at level of understanding  - Provide teaching via preferred learning methods  Outcome: Progressing     Problem: DISCHARGE PLANNING - CARE MANAGEMENT  Goal: Discharge to post-acute care or home with appropriate resources  Description  INTERVENTIONS:  - Conduct assessment to determine patient/family and health care team treatment goals, and need for post-acute services based on payer coverage, community resources, and patient preferences, and barriers to discharge  - Address psychosocial, clinical, and financial barriers to discharge as identified in assessment in conjunction with the patient/family and health care team  - Arrange appropriate level of post-acute services according to patients   needs and preference and payer coverage in collaboration with the physician and health care team  - Communicate with and update the patient/family, physician, and health care team regarding progress on the discharge plan  - Arrange appropriate transportation to post-acute venues  Outcome: Progressing     Problem: Nutrition/Hydration-ADULT  Goal: Nutrient/Hydration intake appropriate for improving, restoring or maintaining nutritional needs  Description  Monitor and assess patient's nutrition/hydration status for malnutrition  Collaborate with interdisciplinary team and initiate plan and interventions as ordered    Monitor patient's weight and dietary intake as ordered or per policy  Utilize nutrition screening tool and intervene as necessary  Determine patient's food preferences and provide high-protein, high-caloric foods as appropriate       INTERVENTIONS:  - Monitor oral intake, urinary output, labs, and treatment plans  - Assess nutrition and hydration status and recommend course of action  - Evaluate amount of meals eaten  - Assist patient with eating if necessary   - Allow adequate time for meals  - Recommend/ encourage appropriate diets, oral nutritional supplements, and vitamin/mineral supplements  - Order, calculate, and assess calorie counts as needed  - Recommend, monitor, and adjust tube feedings and TPN/PPN based on assessed needs  - Assess need for intravenous fluids  - Provide specific nutrition/hydration education as appropriate  - Include patient/family/caregiver in decisions related to nutrition  Outcome: Progressing     Problem: RESPIRATORY - ADULT  Goal: Achieves optimal ventilation and oxygenation  Description  INTERVENTIONS:  - Assess for changes in respiratory status  - Assess for changes in mentation and behavior  - Position to facilitate oxygenation and minimize respiratory effort  - Oxygen administered by appropriate delivery if ordered  - Initiate smoking cessation education as indicated  - Encourage broncho-pulmonary hygiene including cough, deep breathe, Incentive Spirometry  - Assess the need for suctioning and aspirate as needed  - Assess and instruct to report SOB or any respiratory difficulty  - Respiratory Therapy support as indicated  Outcome: Progressing     Problem: METABOLIC, FLUID AND ELECTROLYTES - ADULT  Goal: Electrolytes maintained within normal limits  Description  INTERVENTIONS:  - Monitor labs and assess patient for signs and symptoms of electrolyte imbalances  - Administer electrolyte replacement as ordered  - Monitor response to electrolyte replacements, including repeat lab results as appropriate  - Instruct patient on fluid and nutrition as appropriate  Outcome: Progressing  Goal: Fluid balance maintained  Description  INTERVENTIONS:  - Monitor labs   - Monitor I/O and WT  - Instruct patient on fluid and nutrition as appropriate  - Assess for signs & symptoms of volume excess or deficit  Outcome: Progressing  Goal: Glucose maintained within target range  Description  INTERVENTIONS:  - Monitor Blood Glucose as ordered  - Assess for signs and symptoms of hyperglycemia and hypoglycemia  - Administer ordered medications to maintain glucose within target range  - Assess nutritional intake and initiate nutrition service referral as needed  Outcome: Progressing     Problem: Potential for Falls  Goal: Patient will remain free of falls  Description  INTERVENTIONS:  - Assess patient frequently for physical needs  -  Identify cognitive and physical deficits and behaviors that affect risk of falls    -  [place] fall precautions as indicated by assessment   - Educate patient/family on patient safety including physical limitations  - Instruct patient to call for assistance with activity based on assessment  - Modify environment to reduce risk of injury  - Consider OT/PT consult to assist with strengthening/mobility  Outcome: Progressing     Problem: Prexisting or High Potential for Compromised Skin Integrity  Goal: Skin integrity is maintained or improved  Description  INTERVENTIONS:  - Identify patients at risk for skin breakdown  - Assess and monitor skin integrity  - Assess and monitor nutrition and hydration status  - Monitor labs   - Assess for incontinence   - Turn and reposition patient  - Assist with mobility/ambulation  - Relieve pressure over bony prominences  - Avoid friction and shearing  - Provide appropriate hygiene as needed including keeping skin clean and dry  - Evaluate need for skin moisturizer/barrier cream  - Collaborate with interdisciplinary team   - Patient/family teaching  - Consider wound care consult   Outcome: Progressing

## 2020-04-22 NOTE — ASSESSMENT & PLAN NOTE
On admission, there was clinical suspition of PE per previous team given elevated D-dimer and increased O2 requirement  CT PE study 4/18 showed no evidence of PE  -Weight based Lovenox discontinued  -started on DVT prophylaxis

## 2020-04-23 VITALS
BODY MASS INDEX: 33.48 KG/M2 | SYSTOLIC BLOOD PRESSURE: 120 MMHG | WEIGHT: 220.9 LBS | OXYGEN SATURATION: 90 % | RESPIRATION RATE: 16 BRPM | HEART RATE: 79 BPM | DIASTOLIC BLOOD PRESSURE: 58 MMHG | TEMPERATURE: 98.1 F | HEIGHT: 68 IN

## 2020-04-23 LAB
ALBUMIN SERPL BCP-MCNC: 2.5 G/DL (ref 3.5–5)
ALP SERPL-CCNC: 49 U/L (ref 46–116)
ALT SERPL W P-5'-P-CCNC: 367 U/L (ref 12–78)
ANION GAP SERPL CALCULATED.3IONS-SCNC: 6 MMOL/L (ref 4–13)
AST SERPL W P-5'-P-CCNC: 81 U/L (ref 5–45)
BASOPHILS # BLD AUTO: 0.06 THOUSANDS/ΜL (ref 0–0.1)
BASOPHILS NFR BLD AUTO: 0 % (ref 0–1)
BILIRUB SERPL-MCNC: 0.43 MG/DL (ref 0.2–1)
BUN SERPL-MCNC: 18 MG/DL (ref 5–25)
CALCIUM SERPL-MCNC: 8.7 MG/DL (ref 8.3–10.1)
CHLORIDE SERPL-SCNC: 108 MMOL/L (ref 100–108)
CO2 SERPL-SCNC: 25 MMOL/L (ref 21–32)
CREAT SERPL-MCNC: 0.74 MG/DL (ref 0.6–1.3)
EOSINOPHIL # BLD AUTO: 0.11 THOUSAND/ΜL (ref 0–0.61)
EOSINOPHIL NFR BLD AUTO: 1 % (ref 0–6)
ERYTHROCYTE [DISTWIDTH] IN BLOOD BY AUTOMATED COUNT: 12.7 % (ref 11.6–15.1)
GFR SERPL CREATININE-BSD FRML MDRD: 107 ML/MIN/1.73SQ M
GLUCOSE SERPL-MCNC: 78 MG/DL (ref 65–140)
HBV CORE AB SER QL: NORMAL
HBV CORE IGM SER QL: NORMAL
HBV SURFACE AB SER-ACNC: <3.1 MIU/ML
HBV SURFACE AG SER QL: NORMAL
HCT VFR BLD AUTO: 45.7 % (ref 36.5–49.3)
HCV AB SER QL: NORMAL
HGB BLD-MCNC: 14.5 G/DL (ref 12–17)
IMM GRANULOCYTES # BLD AUTO: 0.34 THOUSAND/UL (ref 0–0.2)
IMM GRANULOCYTES NFR BLD AUTO: 2 % (ref 0–2)
LYMPHOCYTES # BLD AUTO: 4.19 THOUSANDS/ΜL (ref 0.6–4.47)
LYMPHOCYTES NFR BLD AUTO: 28 % (ref 14–44)
MCH RBC QN AUTO: 28.4 PG (ref 26.8–34.3)
MCHC RBC AUTO-ENTMCNC: 31.7 G/DL (ref 31.4–37.4)
MCV RBC AUTO: 90 FL (ref 82–98)
MONOCYTES # BLD AUTO: 1.32 THOUSAND/ΜL (ref 0.17–1.22)
MONOCYTES NFR BLD AUTO: 9 % (ref 4–12)
NEUTROPHILS # BLD AUTO: 9.15 THOUSANDS/ΜL (ref 1.85–7.62)
NEUTS SEG NFR BLD AUTO: 60 % (ref 43–75)
NRBC BLD AUTO-RTO: 0 /100 WBCS
PLATELET # BLD AUTO: 637 THOUSANDS/UL (ref 149–390)
PMV BLD AUTO: 9.9 FL (ref 8.9–12.7)
POTASSIUM SERPL-SCNC: 4.1 MMOL/L (ref 3.5–5.3)
PROT SERPL-MCNC: 6.5 G/DL (ref 6.4–8.2)
RBC # BLD AUTO: 5.1 MILLION/UL (ref 3.88–5.62)
SODIUM SERPL-SCNC: 139 MMOL/L (ref 136–145)
WBC # BLD AUTO: 15.17 THOUSAND/UL (ref 4.31–10.16)

## 2020-04-23 PROCEDURE — 87389 HIV-1 AG W/HIV-1&-2 AB AG IA: CPT | Performed by: FAMILY MEDICINE

## 2020-04-23 PROCEDURE — 86803 HEPATITIS C AB TEST: CPT | Performed by: FAMILY MEDICINE

## 2020-04-23 PROCEDURE — 99232 SBSQ HOSP IP/OBS MODERATE 35: CPT | Performed by: FAMILY MEDICINE

## 2020-04-23 PROCEDURE — 85025 COMPLETE CBC W/AUTO DIFF WBC: CPT | Performed by: FAMILY MEDICINE

## 2020-04-23 PROCEDURE — 86706 HEP B SURFACE ANTIBODY: CPT | Performed by: FAMILY MEDICINE

## 2020-04-23 PROCEDURE — 86705 HEP B CORE ANTIBODY IGM: CPT | Performed by: FAMILY MEDICINE

## 2020-04-23 PROCEDURE — 86704 HEP B CORE ANTIBODY TOTAL: CPT | Performed by: FAMILY MEDICINE

## 2020-04-23 PROCEDURE — 94761 N-INVAS EAR/PLS OXIMETRY MLT: CPT

## 2020-04-23 PROCEDURE — 99238 HOSP IP/OBS DSCHRG MGMT 30/<: CPT | Performed by: FAMILY MEDICINE

## 2020-04-23 PROCEDURE — 87340 HEPATITIS B SURFACE AG IA: CPT | Performed by: FAMILY MEDICINE

## 2020-04-23 PROCEDURE — 80053 COMPREHEN METABOLIC PANEL: CPT | Performed by: FAMILY MEDICINE

## 2020-04-23 PROCEDURE — NC001 PR NO CHARGE: Performed by: FAMILY MEDICINE

## 2020-04-23 RX ORDER — PREDNISONE 10 MG/1
30 TABLET ORAL DAILY
Qty: 9 TABLET | Refills: 0 | Status: SHIPPED | OUTPATIENT
Start: 2020-04-26 | End: 2020-04-30

## 2020-04-23 RX ORDER — PREDNISONE 10 MG/1
10 TABLET ORAL DAILY
Qty: 3 TABLET | Refills: 0 | Status: SHIPPED | OUTPATIENT
Start: 2020-05-02 | End: 2020-05-06 | Stop reason: ALTCHOICE

## 2020-04-23 RX ORDER — PREDNISONE 20 MG/1
20 TABLET ORAL DAILY
Qty: 3 TABLET | Refills: 0 | Status: SHIPPED | OUTPATIENT
Start: 2020-04-29 | End: 2020-05-02

## 2020-04-23 RX ORDER — PREDNISONE 20 MG/1
40 TABLET ORAL DAILY
Qty: 4 TABLET | Refills: 0 | Status: SHIPPED | OUTPATIENT
Start: 2020-04-24 | End: 2020-04-26

## 2020-04-23 RX ADMIN — PREDNISONE 40 MG: 20 TABLET ORAL at 08:18

## 2020-04-23 RX ADMIN — Medication 250 MG: at 08:18

## 2020-04-23 NOTE — ASSESSMENT & PLAN NOTE
-Likely secondary to COVID vs steroid use vs adverse effect of Statin  - will check Hepatis panel and HIV screening    - Will continue to trend LFTs

## 2020-04-23 NOTE — DISCHARGE INSTRUCTIONS
Using Oxygen at [facility]:   You may need extra oxygen if you are not able to breathe enough oxygen on your own  You need a doctor's order to get oxygen therapy  The order will include how much you need, and how often you need it  Use oxygen as directed  DISCHARGE INSTRUCTIONS:   Types of oxygen supply systems:  Your healthcare provider will pick your oxygen supply based on how much oxygen you need, and how active you are  Oxygen can be supplied the following 3 ways:  · Compressed oxygen  holds oxygen in a metal cylinder (tank) under pressure  The tank can be set to release only the amount of oxygen you need as you breathe  Compressed oxygen tanks are heavy, and are meant to be used when you stay mostly in one place  You may need help to move or secure it  Smaller tanks and wheeled carts are available to help you move with ease, or when you travel  · Liquid oxygen  is kept chilled inside a small, insulated case  The liquid warms and becomes a breathable gas when you need to breathe in  Liquid oxygen cases are smaller and easy to carry around  You can refill your small liquid oxygen case from a big tank kept in your home  Your oxygen delivery service will fill your large tank every 1 to 2 weeks  · An oxygen concentrator  is an electric machine that stores oxygen from the air  This machine is heavy and may come with a wheeled cart to help you move it from room to room  Types of oxygen breathing devices:  Each device is connected to the oxygen supply with tubing  The tubing should be long enough to let you move around your house  You may need a humidifier to moisten the oxygen  This may prevent dryness in your nose, mouth, and throat  Ask your healthcare provider if you need a humidifier, and how to attach it to your oxygen supply  · A nasal cannula  is a 2-pronged plastic tube that fits inside your nostrils  Place one prong in each nostril   Loop the tubing around your ears, or attach it to your eyeglasses to keep it in place  Make sure your cannula fits you well and is comfortable  · An oxygen mask  is attached to a plastic tube and covers your nose and mouth  It is usually held in place by an elastic strap that wraps around the back of your head  You can use an oxygen mask if you need a lot of oxygen  Your healthcare provider may tell you to use a nasal cannula during the day, and a mask at night  A mask may help if your nose is dry or stuffy  · Transtracheal oxygen  is given through a small, flexible catheter inserted into your trachea (windpipe)  A necklace holds the catheter in place  Use oxygen safely:   · Do not use oxygen around heat or flame  Compressed oxygen can catch on fire  Keep the oxygen container 5 feet away from open flames or heaters, such as candles or hot water heaters  Do not use anything flammable, such as cleaning fluids, gasoline, or aerosol sprays near your oxygen  Keep a fire extinguisher and a phone close by in case of a fire  Tell your fire department that you have oxygen in your home if you need to call them for help  · Do not smoke while you are using oxygen  Do not let anyone smoke around you  · Do not change the flow of your oxygen  unless your healthcare provider tells you to  Turn your oxygen container or concentrator off when you are not using your oxygen  · Do not drink alcohol or take sedatives  while you use oxygen  These may slow your breathing  · Put signs on all the doors of your house  to let visitors and emergency workers know that oxygen is in use  Tell your electric company that you have electrical medical equipment  They will put you on a priority list to fix your power quickly if it goes out  · Follow instructions for use and maintenance of your oxygen equipment  Keep oxygen containers secured in an upright position  Oxygen containers may become damaged if they fall over   An oxygen container may cause serious injury if it breaks  Clean your oxygen supplies:   · Wash or replace equipment parts  as directed  Wash your nasal prongs with soap and water twice a week  Replace your nasal prongs every 2 weeks  Replace your tubing every 2 months, or when it becomes stiff  Change the tubing if moisture appears on the inside of the tube  Moisture can make bacteria grow, and cause infections  Change the cannula and tubing after you have a cold or the flu  · Ask your healthcare provider how to clean your oxygen mask or transtracheal catheter  Replace the oxygen mask every 2 weeks  · Disinfect the buttons and outside of your oxygen concentrator  Clean your air filter at least once a week with soap and water  Let it air dry  Replace the filter at least once a week  Ask your oxygen supply company to service your concentrator at least once a year  Ask your healthcare provider if you have any questions about how to clean the air filter  · Wash your humidifier bottle with soap and warm water between each refill  Rinse and air dry the bottle before you refill it with distilled water  Do not use tap water  Disinfect the outside of the bottle and cap once the inside of the bottle has been washed  General tips for oxygen use:   · Keep a backup oxygen supply in case of an emergency  Always keep a backup oxygen tank that does not run on electricity in case there is a power failure  Oxygen may leak out of your container  Ask your healthcare provider if your supply has a tool to reduce wasted oxygen  · Use gauze or water-based lubricants to help soothe your skin  Oxygen may dry out your skin, mouth, or throat  Place gauze on top of your ears or under the tubing on your cheeks if they become sore  Use water-based lubricants on your lips and nostrils if they become dry or sore  Do not use oil-based lubricants  They may be flammable  · Order new oxygen well before your current supply runs out    Your oxygen company may not deliver on holidays  Ask your healthcare provider for help planning your oxygen needs when you travel  · Keep the phone number of your oxygen supply company handy  Place it in an area that you see every day, such as on your fridge  Contact them if you have any problems with your supplies  Follow up with your healthcare provider as directed:  Write down your questions so you remember to ask them during your visits  Contact your healthcare provider if:   · The oxygen tubes create sores on your skin, or make you bleed  · You have trouble sleeping because you cannot breathe well  · You have questions or concerns about your condition or care  Return to the emergency department if:   · You have a headache, your heart is beating fast, and you are shaking  · Your breathing is shallow or slow, or more difficult than usual for you  · Your breathing becomes fast, or it hurts to inhale  · You have sudden chest pain  · You feel anxious or cannot sit still  · Your fingernails or lips turn blue  · You are tired, confused, cannot think clearly, or faint  © 2017 [facility] Information is for End User's use only and may not be sold, redistributed or otherwise used for commercial purposes  All illustrations and images included in CareNotes® are the copyrighted property of [employer]  or Yonas [employer]  The above information is an  only  It is not intended as medical advice for individual conditions or treatments  Talk to your doctor, nurse or pharmacist before following any medical regimen to see if it is safe and effective for you

## 2020-04-23 NOTE — PLAN OF CARE
Problem: PAIN - ADULT  Goal: Verbalizes/displays adequate comfort level or baseline comfort level  Description  Interventions:  - Encourage patient to monitor pain and request assistance  - Assess pain using appropriate pain scale  - Administer analgesics based on type and severity of pain and evaluate response  - Implement non-pharmacological measures as appropriate and evaluate response  - Consider cultural and social influences on pain and pain management  - Notify physician/advanced practitioner if interventions unsuccessful or patient reports new pain  Outcome: Progressing     Problem: INFECTION - ADULT  Goal: Absence or prevention of progression during hospitalization  Description  INTERVENTIONS:  - Assess and monitor for signs and symptoms of infection  - Monitor lab/diagnostic results  - Monitor all insertion sites, i e  indwelling lines, tubes, and drains  - Monitor endotracheal if appropriate and nasal secretions for changes in amount and color  - [place] appropriate cooling/warming therapies per order  - Administer medications as ordered  - Instruct and encourage patient and family to use good hand hygiene technique  - Identify and instruct in appropriate isolation precautions for identified infection/condition  Outcome: Progressing  Goal: Absence of fever/infection during neutropenic period  Description  INTERVENTIONS:  - Monitor WBC    Outcome: Progressing     Problem: SAFETY ADULT  Goal: Patient will remain free of falls  Description  INTERVENTIONS:  - Assess patient frequently for physical needs  -  Identify cognitive and physical deficits and behaviors that affect risk of falls    -  [place] fall precautions as indicated by assessment   - Educate patient/family on patient safety including physical limitations  - Instruct patient to call for assistance with activity based on assessment  - Modify environment to reduce risk of injury  - Consider OT/PT consult to assist with strengthening/mobility  Outcome: Progressing  Goal: Maintain or return to baseline ADL function  Description  INTERVENTIONS:  -  Assess patient's ability to carry out ADLs; assess patient's baseline for ADL function and identify physical deficits which impact ability to perform ADLs (bathing, care of mouth/teeth, toileting, grooming, dressing, etc )  - Assess/evaluate cause of self-care deficits   - Assess range of motion  - Assess patient's mobility; develop plan if impaired  - Assess patient's need for assistive devices and provide as appropriate  - Encourage maximum independence but intervene and supervise when necessary  - Involve family in performance of ADLs  - Assess for home care needs following discharge   - Consider OT consult to assist with ADL evaluation and planning for discharge  - Provide patient education as appropriate  Outcome: Progressing  Goal: Maintain or return mobility status to optimal level  Description  INTERVENTIONS:  - Assess patient's baseline mobility status (ambulation, transfers, stairs, etc )    - Identify cognitive and physical deficits and behaviors that affect mobility  - Identify mobility aids required to assist with transfers and/or ambulation (gait belt, sit-to-stand, lift, walker, cane, etc )  - [place] fall precautions as indicated by assessment  - Record patient progress and toleration of activity level on Mobility SBAR; progress patient to next Phase/Stage  - Instruct patient to call for assistance with activity based on assessment  - Consider rehabilitation consult to assist with strengthening/weightbearing, etc   Outcome: Progressing     Problem: DISCHARGE PLANNING  Goal: Discharge to home or other facility with appropriate resources  Description  INTERVENTIONS:  - Identify barriers to discharge w/patient and caregiver  - Arrange for needed discharge resources and transportation as appropriate  - Identify discharge learning needs (meds, wound care, etc )  - Arrange for interpretive services to assist at discharge as needed  - Refer to Case Management Department for coordinating discharge planning if the patient needs post-hospital services based on physician/advanced practitioner order or complex needs related to functional status, cognitive ability, or social support system  Outcome: Progressing     Problem: Knowledge Deficit  Goal: Patient/family/caregiver demonstrates understanding of disease process, treatment plan, medications, and discharge instructions  Description  Complete learning assessment and assess knowledge base  Interventions:  - Provide teaching at level of understanding  - Provide teaching via preferred learning methods  Outcome: Progressing     Problem: DISCHARGE PLANNING - CARE MANAGEMENT  Goal: Discharge to post-acute care or home with appropriate resources  Description  INTERVENTIONS:  - Conduct assessment to determine patient/family and health care team treatment goals, and need for post-acute services based on payer coverage, community resources, and patient preferences, and barriers to discharge  - Address psychosocial, clinical, and financial barriers to discharge as identified in assessment in conjunction with the patient/family and health care team  - Arrange appropriate level of post-acute services according to patients   needs and preference and payer coverage in collaboration with the physician and health care team  - Communicate with and update the patient/family, physician, and health care team regarding progress on the discharge plan  - Arrange appropriate transportation to post-acute venues  Outcome: Progressing     Problem: Nutrition/Hydration-ADULT  Goal: Nutrient/Hydration intake appropriate for improving, restoring or maintaining nutritional needs  Description  Monitor and assess patient's nutrition/hydration status for malnutrition  Collaborate with interdisciplinary team and initiate plan and interventions as ordered    Monitor patient's weight and dietary intake as ordered or per policy  Utilize nutrition screening tool and intervene as necessary  Determine patient's food preferences and provide high-protein, high-caloric foods as appropriate       INTERVENTIONS:  - Monitor oral intake, urinary output, labs, and treatment plans  - Assess nutrition and hydration status and recommend course of action  - Evaluate amount of meals eaten  - Assist patient with eating if necessary   - Allow adequate time for meals  - Recommend/ encourage appropriate diets, oral nutritional supplements, and vitamin/mineral supplements  - Order, calculate, and assess calorie counts as needed  - Recommend, monitor, and adjust tube feedings and TPN/PPN based on assessed needs  - Assess need for intravenous fluids  - Provide specific nutrition/hydration education as appropriate  - Include patient/family/caregiver in decisions related to nutrition  Outcome: Progressing     Problem: RESPIRATORY - ADULT  Goal: Achieves optimal ventilation and oxygenation  Description  INTERVENTIONS:  - Assess for changes in respiratory status  - Assess for changes in mentation and behavior  - Position to facilitate oxygenation and minimize respiratory effort  - Oxygen administered by appropriate delivery if ordered  - Initiate smoking cessation education as indicated  - Encourage broncho-pulmonary hygiene including cough, deep breathe, Incentive Spirometry  - Assess the need for suctioning and aspirate as needed  - Assess and instruct to report SOB or any respiratory difficulty  - Respiratory Therapy support as indicated  Outcome: Progressing     Problem: METABOLIC, FLUID AND ELECTROLYTES - ADULT  Goal: Electrolytes maintained within normal limits  Description  INTERVENTIONS:  - Monitor labs and assess patient for signs and symptoms of electrolyte imbalances  - Administer electrolyte replacement as ordered  - Monitor response to electrolyte replacements, including repeat lab results as appropriate  - Instruct patient on fluid and nutrition as appropriate  Outcome: Progressing  Goal: Fluid balance maintained  Description  INTERVENTIONS:  - Monitor labs   - Monitor I/O and WT  - Instruct patient on fluid and nutrition as appropriate  - Assess for signs & symptoms of volume excess or deficit  Outcome: Progressing  Goal: Glucose maintained within target range  Description  INTERVENTIONS:  - Monitor Blood Glucose as ordered  - Assess for signs and symptoms of hyperglycemia and hypoglycemia  - Administer ordered medications to maintain glucose within target range  - Assess nutritional intake and initiate nutrition service referral as needed  Outcome: Progressing     Problem: Potential for Falls  Goal: Patient will remain free of falls  Description  INTERVENTIONS:  - Assess patient frequently for physical needs  -  Identify cognitive and physical deficits and behaviors that affect risk of falls    -  [place] fall precautions as indicated by assessment   - Educate patient/family on patient safety including physical limitations  - Instruct patient to call for assistance with activity based on assessment  - Modify environment to reduce risk of injury  - Consider OT/PT consult to assist with strengthening/mobility  Outcome: Progressing     Problem: Prexisting or High Potential for Compromised Skin Integrity  Goal: Skin integrity is maintained or improved  Description  INTERVENTIONS:  - Identify patients at risk for skin breakdown  - Assess and monitor skin integrity  - Assess and monitor nutrition and hydration status  - Monitor labs   - Assess for incontinence   - Turn and reposition patient  - Assist with mobility/ambulation  - Relieve pressure over bony prominences  - Avoid friction and shearing  - Provide appropriate hygiene as needed including keeping skin clean and dry  - Evaluate need for skin moisturizer/barrier cream  - Collaborate with interdisciplinary team   - Patient/family teaching  - Consider wound care consult   Outcome: Progressing

## 2020-04-23 NOTE — ASSESSMENT & PLAN NOTE
On admission, there was clinical suspicion of PE per previous team given elevated D-dimer and increased O2 requirement  CT PE study 4/18 showed no evidence of PE  -Weight based Lovenox discontinued  -started on DVT prophylaxis

## 2020-04-23 NOTE — RESPIRATORY THERAPY NOTE
Home Oxygen Qualifying Test       Patient name: Cheryl Chow        : 1970   Date of Test:  2020  Diagnosis:      Home Oxygen Test:    **Medicare Guidelines require item(s) 1-5 on all ambulatory patients or 1 and 2 on non-ambulatory patients  1   Baseline SPO2 on Room Air at rest 94 %  2   SPO2 during exercise on Room Air 86 %  During exercise monitor SpO2  If SPO2 increases >=89% with ambulation do not add supplemental             oxygen  If <= 88% on room air add O2 via NC and titrate patient  Patient must be ambulated with O2 and titrated to > 88% with exertion  3   SPO2 on Oxygen at rest 98 % 2 lpm     4   SPO2 during exercise on Oxygen  91% a liter flow of 2 lpm     5   Exercise     performed:   Pt ambulated and did arm exercises  He also stood up and then sat down on his bed repeatedly     [x]  Supplemental Home Oxygen is indicated  []  Client does not qualify for home oxygen        Respiratory Additional Notes- This pleasant gentleman will need home O2 @ 2L for exertion     Du Cor[place], RT

## 2020-04-23 NOTE — PROGRESS NOTES
Progress Note - [address] 1970, 44 y o  male MRN: [id number]    Unit/Bed#: [facility] 805-01 Encounter: [id number]    Primary Care Provider: Sherlyn Barillas MD   Date and time admitted to hospital: 4/13/2020  4:58 PM        * COVID-19 virus infection  Assessment & Plan  Pt is COVID positive since 04/06  Currently with very mild symptoms  -CXR on admission: Patchy bilateral airspace disease could be indicative of multifocal pneumonia  -Patient completed azithromycin x4, hydroxychloroquine x5    -Currently saturating  89-90% on RA    -Continue steroid taper per Pulm  - Per Pulm:  · Continue self proning and incentive spirometry  · Continue prednisone taper  · Stop vitamin C and zinc and Lipitor  · Stop trending inflammatory markers  · To consider discharge home if possible on oxygen, will evaluate today for home O2 need    Pulmonary embolism ([facility] [address] )  Assessment & Plan  On admission, there was clinical suspicion of PE per previous team given elevated D-dimer and increased O2 requirement  CT PE study 4/18 showed no evidence of PE  -Weight based Lovenox discontinued  -started on DVT prophylaxis    Elevated liver enzymes  Assessment & Plan  -Likely secondary to COVID vs steroid use vs adverse effect of Statin  - will check Hepatis panel and HIV screening    - Will continue to trend LFTs     Diarrhea  Assessment & Plan  Resolved   C  Diff negative on 4/15  -Likely secondary to COVID          Diet:        Diet Orders   (From admission, onward)             Start     Ordered    04/17/20 1156  Dietary nutrition supplements  Once     Question Answer Comment   Select Supplement: Banatrol Plus Banana Flakes    Mix with: Vanilla Pudding    Frequency Breakfast, Lunch, Dinner        04/17/20 1155    04/14/20 1057  Diet Regular; Regular House  Diet effective now     Question Answer Comment   Diet Type Regular    Regular Regular House    Special Instructions Disposable Meal    RD to adjust diet per protocol?  Yes 04/14/20 1057              DVT Ppx: Lovenox  Code: Full code  Disposition: This patient currently requires Inpatient level care  Anticipated discharge pending O2 wean  Subjective:   Pt seen and examined at bedside by attending and senior resident  Reported to be feeling well  Pt denies SOB, CP, fever, chills, diarrhea, n/v   He desaturated to 86% in RA while moving around in bed this am       Discussed with overnight resident, nursing staff and [facility] service team      Objective:     Vitals: Blood pressure 120/58, pulse 79, temperature 98 1 °F (36 7 °C), resp  rate 16, height 5' 8" (1 727 m), weight 100 kg (220 lb 14 4 oz), SpO2 90 %  ,Body mass index is 33 59 kg/m²  Intake/Output Summary (Last 24 hours) at 4/23/2020 1117  Last data filed at 4/23/2020 0900  Gross per 24 hour   Intake 960 ml   Output 100 ml   Net 860 ml       Physical Exam:     Physical Exam   HENT:   Head: Normocephalic and atraumatic  Eyes: EOM are normal    Cardiovascular: Normal rate and regular rhythm  Pulmonary/Chest: No respiratory distress  Decreased BS b/l lung bases   Musculoskeletal: Normal range of motion  Neurological: He is alert  Skin: Skin is warm and dry  Psychiatric: He has a normal mood and affect  Vitals reviewed  Invasive Devices     Peripheral Intravenous Line            Peripheral IV 04/18/20 Left;Ventral (anterior) Forearm 4 days                          Lab, Imaging and other studies: I have personally reviewed pertinent reports       Pertinent Lab Findings:   ·     ·   Recent Results (from the past 24 hour(s))   CBC and differential    Collection Time: 04/23/20  6:02 AM   Result Value Ref Range    WBC 15 17 (H) 4 31 - 10 16 Thousand/uL    RBC 5 10 3 88 - 5 62 Million/uL    Hemoglobin 14 5 12 0 - 17 0 g/dL    Hematocrit 45 7 36 5 - 49 3 %    MCV 90 82 - 98 fL    MCH 28 4 26 8 - 34 3 pg    MCHC 31 7 31 4 - 37 4 g/dL    RDW 12 7 11 6 - 15 1 %    MPV 9 9 8 9 - 12 7 fL    Platelets 364 (H) 794 - 390 Thousands/uL    nRBC 0 /100 WBCs    Neutrophils Relative 60 43 - 75 %    Immat GRANS % 2 0 - 2 %    Lymphocytes Relative 28 14 - 44 %    Monocytes Relative 9 4 - 12 %    Eosinophils Relative 1 0 - 6 %    Basophils Relative 0 0 - 1 %    Neutrophils Absolute 9 15 (H) 1 85 - 7 62 Thousands/µL    Immature Grans Absolute 0 34 (H) 0 00 - 0 20 Thousand/uL    Lymphocytes Absolute 4 19 0 60 - 4 47 Thousands/µL    Monocytes Absolute 1 32 (H) 0 17 - 1 22 Thousand/µL    Eosinophils Absolute 0 11 0 00 - 0 61 Thousand/µL    Basophils Absolute 0 06 0 00 - 0 10 Thousands/µL   Comprehensive metabolic panel    Collection Time: 04/23/20  6:02 AM   Result Value Ref Range    Sodium 139 136 - 145 mmol/L    Potassium 4 1 3 5 - 5 3 mmol/L    Chloride 108 100 - 108 mmol/L    CO2 25 21 - 32 mmol/L    ANION GAP 6 4 - 13 mmol/L    BUN 18 5 - 25 mg/dL    Creatinine 0 74 0 60 - 1 30 mg/dL    Glucose 78 65 - 140 mg/dL    Calcium 8 7 8 3 - 10 1 mg/dL    AST 81 (H) 5 - 45 U/L     (H) 12 - 78 U/L    Alkaline Phosphatase 49 46 - 116 U/L    Total Protein 6 5 6 4 - 8 2 g/dL    Albumin 2 5 (L) 3 5 - 5 0 g/dL    Total Bilirubin 0 43 0 20 - 1 00 mg/dL    eGFR 107 ml/min/1 73sq m        Imaging:  None    VTE Pharmacologic Prophylaxis: Enoxaparin (Lovenox)  VTE Mechanical Prophylaxis: sequential compression device    Current Facility-Administered Medications   Medication Dose Route Frequency    enoxaparin (LOVENOX) subcutaneous injection 40 mg  40 mg Subcutaneous Q24H PUJA    loperamide (IMODIUM) capsule 2 mg  2 mg Oral TID PRN    LORazepam (ATIVAN) tablet 0 5 mg  0 5 mg Oral Q8H PRN    predniSONE tablet 40 mg  40 mg Oral Daily    Followed by   [facility] ON 4/26/2020] predniSONE tablet 30 mg  30 mg Oral Daily    Followed by   [facility] ON 4/29/2020] predniSONE tablet 20 mg  20 mg Oral Daily    Followed by   [facility] ON 5/2/2020] predniSONE tablet 10 mg  10 mg Oral Daily    saccharomyces boulardii (FLORASTOR) capsule 250 mg  250 mg Oral BID Mei Hitchcock MD  [facility] U  2  PGY-2  4/23/2020  11:17 AM    Please be aware that this note contains text that was dictated and there may be errors pertaining to "sound-alike" words during the dictation process

## 2020-04-23 NOTE — ASSESSMENT & PLAN NOTE
Pt is COVID positive since 04/06   Currently with very mild symptoms  -CXR on admission: Patchy bilateral airspace disease could be indicative of multifocal pneumonia  -Patient completed azithromycin x4, hydroxychloroquine x5    -Currently saturating  89-90% on RA    -Continue steroid taper per Pulm  - Per Pulm:  · Continue self proning and incentive spirometry  · Continue prednisone taper  · Stop vitamin C and zinc and Lipitor  · Stop trending inflammatory markers  · To consider discharge home if possible on oxygen, will evaluate today for home O2 need

## 2020-04-23 NOTE — DISCHARGE SUMMARY
Discharge Summary - Vicky Staley 44 y o  male MRN: [id number]    Unit/Bed#: [facility] 805-01 Encounter: [id number]    Admission Date: 4/13/2020   Discharge Date: 4/23/2020    Diagnosis: Acute respiratory distress [R06 03]  SOB (shortness of breath) [R06 02]  CRP elevated [R79 82]  Elevated ferritin [R79 89]  Elevated procalcitonin [R79 89]  COVID-19 virus infection [U07 1]    Important Physician Related Follow Up:   · PCP     Procedures Performed:   Orders Placed This Encounter   Procedures    ED ECG Documentation Only    ED ECG Documentation Only       Significant Findings/Abnormal Results with this admission:  CTA on 4/18  1  No evidence of pulmonary embolism  2   Confluent areas of groundglass opacities throughout bilateral lungs which may be due to pneumonia in the appropriate clinical setting  Hospital Course:   HPI per Dr Migdalia Epley y o  male w/ no significant PMHx who presented for worsening SOB  He has been symptomatic since April 2nd with fever, he tested positive for COVID on April 6, and was most recently seen in the respiratory clinic on the 10th  He was given Advair inhaler and cough medicine  In the clinic his O2 sat was >90% while sitting and above 95% while ambulating  He had a phone visit on Sunday and reported that he felt the same as before his visit to the respiratory clinic  He reports a 1 day history of worsening shortness of breath  He had a virtual visit today and was advised to come to the ED for further intervention  He reports still fevering to 103 this morning and has had a fever daily  He does not report worsening of cough "    While in the ED, he was started on azithromycin, plaquenil, ceftriaxone, and methylprednisolone  He was requiring 15L NC  He was also started on vitamin C and D, zinc, and atorvastatin per COVID-19 treatment protocol and placed in ICU  While in the ICU patient developed diarrhea that was treated with imodium   C  Dif was negative and the diarrhea was most likely due to COVID  His respiratory status continued to improve  On 4/18 patient was transferred to the medical floor and he was requiring 5 L O2  He was continued on a steroid taper  As his respiratory status continued to improve, his inflammatory markers continued to decrease as well  His LFT's continued to trend up and atorvastatin was discontinued  On 4/20 patient continued to improve and pulmonology cleared patient for discharge with home oxygen  On 4/23 a home oxygen evaluation was completed for patient, confirming his need for home oxygen therapy  On day of discharge patient was clinically improved  He was requiring 2L oxygen and O2 sat remained above 90%  The plan was discussed with the patient and he was agreeable  Home oxygen and a pulse ox was sent to patient's home  He will continue his prednisone taper and follow-up with his PCP in 1 week  Complications: None    Discharge Diagnosis: Acute respiratory failure due to COVID-19 infection    Exam on Day of Discharge:   Please see progress note on 4/23 for full physical exam     Condition at Discharge: good     Discharge instructions/Information to patient and family:   See after visit summary for information provided to patient and family  Provisions for Follow-Up Care:  See after visit summary for information related to follow-up care and any pertinent home health orders  Disposition: Home    Planned Readmission: No    Discharge Statement   I spent 30 minutes discharging the patient  This time was spent on the day of discharge  I had direct contact with the patient on the day of discharge  Additional documentation is required if more than 30 minutes were spent on discharge  Discharge Medications:  See after visit summary for reconciled discharge medications provided to patient and family    Medication changes made with this admission:  · Start:  · None  · Stop:  · None  · Continue:  · Prednisone taper    Jaylon Ballesteros MD  [facility] Jeffrey Wray PGY1  4/23/2020  12:24 PM

## 2020-04-23 NOTE — QUICK NOTE
Home oxygen evaluation performed today 4/23 by respiratory therapy  While on room air, his baseline O2 at rest is 94%  During exercise on room air O2 decreased to 86%  While at rest, on 2L, O2 is 98%  During exercise while on 1L of oxygen, O2 was 91%  From these results, patient will need home oxygen therapy at 2 L for minimum 3 months in order to maintain an O2 saturation >88%

## 2020-04-23 NOTE — SOCIAL WORK
CM sent O2 order to [facility] via [facility]  Pt is unable to to order pulse ox  CM provide pulse ox via [facility] Case [facility]  CM notified pt that O2 and pulse ox has been delivered  Pt's wife will pick him up today at 3:30 pm      No other CM needs noted

## 2020-04-23 NOTE — NURSING NOTE
Patient noted to be hypoxic in the mid 80s on room air via [address]  2L O2 via nasal cannula applied, patient's oxygen saturation came up to 90%  Patient endorses some shortness of breath, especially while he was trying to work with his incentive spirometer  Lauro Lobo with family medicine made aware via [employer]

## 2020-04-24 ENCOUNTER — TRANSITIONAL CARE MANAGEMENT (OUTPATIENT)
Dept: FAMILY MEDICINE CLINIC | Facility: CLINIC | Age: 50
End: 2020-04-24

## 2020-04-24 LAB — HIV 1+2 AB+HIV1 P24 AG SERPL QL IA: NORMAL

## 2020-04-27 ENCOUNTER — TELEMEDICINE (OUTPATIENT)
Dept: FAMILY MEDICINE CLINIC | Facility: CLINIC | Age: 50
End: 2020-04-27

## 2020-04-27 ENCOUNTER — TELEPHONE (OUTPATIENT)
Dept: FAMILY MEDICINE CLINIC | Facility: CLINIC | Age: 50
End: 2020-04-27

## 2020-04-27 VITALS — OXYGEN SATURATION: 90 %

## 2020-04-27 DIAGNOSIS — U07.1 COVID-19 VIRUS INFECTION: Primary | ICD-10-CM

## 2020-04-27 PROCEDURE — G2025 DIS SITE TELE SVCS RHC/FQHC: HCPCS | Performed by: FAMILY MEDICINE

## 2020-04-28 ENCOUNTER — TELEMEDICINE (OUTPATIENT)
Dept: FAMILY MEDICINE CLINIC | Facility: CLINIC | Age: 50
End: 2020-04-28

## 2020-04-28 DIAGNOSIS — U07.1 COVID-19 VIRUS INFECTION: Primary | ICD-10-CM

## 2020-04-28 PROCEDURE — G2025 DIS SITE TELE SVCS RHC/FQHC: HCPCS | Performed by: FAMILY MEDICINE

## 2020-04-29 ENCOUNTER — TELEMEDICINE (OUTPATIENT)
Dept: FAMILY MEDICINE CLINIC | Facility: CLINIC | Age: 50
End: 2020-04-29

## 2020-04-29 DIAGNOSIS — U07.1 COVID-19 VIRUS INFECTION: Primary | ICD-10-CM

## 2020-04-29 PROCEDURE — G2025 DIS SITE TELE SVCS RHC/FQHC: HCPCS | Performed by: FAMILY MEDICINE

## 2020-04-30 ENCOUNTER — TELEMEDICINE (OUTPATIENT)
Dept: FAMILY MEDICINE CLINIC | Facility: CLINIC | Age: 50
End: 2020-04-30

## 2020-04-30 VITALS — BODY MASS INDEX: 33.15 KG/M2 | TEMPERATURE: 97 F | WEIGHT: 218 LBS | OXYGEN SATURATION: 96 %

## 2020-04-30 DIAGNOSIS — U07.1 COVID-19 VIRUS INFECTION: Primary | ICD-10-CM

## 2020-04-30 PROCEDURE — G2025 DIS SITE TELE SVCS RHC/FQHC: HCPCS | Performed by: FAMILY MEDICINE

## 2020-05-01 ENCOUNTER — TELEMEDICINE (OUTPATIENT)
Dept: FAMILY MEDICINE CLINIC | Facility: CLINIC | Age: 50
End: 2020-05-01

## 2020-05-01 DIAGNOSIS — U07.1 COVID-19 VIRUS INFECTION: Primary | ICD-10-CM

## 2020-05-01 PROCEDURE — G2025 DIS SITE TELE SVCS RHC/FQHC: HCPCS | Performed by: FAMILY MEDICINE

## 2020-05-02 ENCOUNTER — TELEMEDICINE (OUTPATIENT)
Dept: OTHER | Facility: HOSPITAL | Age: 50
End: 2020-05-02
Payer: MEDICARE

## 2020-05-02 DIAGNOSIS — J96.01 ACUTE RESPIRATORY FAILURE WITH HYPOXIA (HCC): Primary | ICD-10-CM

## 2020-05-02 DIAGNOSIS — U07.1 COVID-19 VIRUS INFECTION: ICD-10-CM

## 2020-05-02 PROBLEM — R50.9 FEVER: Status: RESOLVED | Noted: 2020-04-03 | Resolved: 2020-05-02

## 2020-05-02 PROBLEM — R19.7 DIARRHEA: Status: RESOLVED | Noted: 2020-04-14 | Resolved: 2020-05-02

## 2020-05-02 PROCEDURE — 99214 OFFICE O/P EST MOD 30 MIN: CPT | Performed by: FAMILY MEDICINE

## 2020-05-03 ENCOUNTER — TELEMEDICINE (OUTPATIENT)
Dept: FAMILY MEDICINE CLINIC | Facility: CLINIC | Age: 50
End: 2020-05-03

## 2020-05-03 DIAGNOSIS — J96.01 ACUTE RESPIRATORY FAILURE WITH HYPOXIA (HCC): Primary | ICD-10-CM

## 2020-05-03 DIAGNOSIS — U07.1 COVID-19 VIRUS INFECTION: ICD-10-CM

## 2020-05-03 PROCEDURE — G2025 DIS SITE TELE SVCS RHC/FQHC: HCPCS | Performed by: FAMILY MEDICINE

## 2020-05-06 ENCOUNTER — TELEMEDICINE (OUTPATIENT)
Dept: FAMILY MEDICINE CLINIC | Facility: CLINIC | Age: 50
End: 2020-05-06

## 2020-05-06 DIAGNOSIS — R09.02 HYPOXIA: Primary | ICD-10-CM

## 2020-05-06 PROCEDURE — G2025 DIS SITE TELE SVCS RHC/FQHC: HCPCS | Performed by: FAMILY MEDICINE

## 2020-05-07 ENCOUNTER — TELEMEDICINE (OUTPATIENT)
Dept: FAMILY MEDICINE CLINIC | Facility: CLINIC | Age: 50
End: 2020-05-07

## 2020-05-07 DIAGNOSIS — J96.01 ACUTE RESPIRATORY FAILURE WITH HYPOXIA (HCC): Primary | ICD-10-CM

## 2020-05-07 DIAGNOSIS — U07.1 COVID-19 VIRUS INFECTION: ICD-10-CM

## 2020-05-07 PROCEDURE — G2025 DIS SITE TELE SVCS RHC/FQHC: HCPCS | Performed by: FAMILY MEDICINE

## 2020-05-08 ENCOUNTER — TELEPHONE (OUTPATIENT)
Dept: FAMILY MEDICINE CLINIC | Facility: CLINIC | Age: 50
End: 2020-05-08

## 2020-05-12 ENCOUNTER — TELEMEDICINE (OUTPATIENT)
Dept: FAMILY MEDICINE CLINIC | Facility: CLINIC | Age: 50
End: 2020-05-12

## 2020-05-12 DIAGNOSIS — U07.1 COVID-19 VIRUS INFECTION: ICD-10-CM

## 2020-05-12 DIAGNOSIS — R05.9 COUGH: ICD-10-CM

## 2020-05-12 DIAGNOSIS — R06.03 ACUTE RESPIRATORY DISTRESS: Primary | ICD-10-CM

## 2020-05-12 DIAGNOSIS — R06.2 WHEEZING: ICD-10-CM

## 2020-05-12 PROBLEM — J06.9 VIRAL URI WITH COUGH: Status: RESOLVED | Noted: 2018-10-29 | Resolved: 2020-05-12

## 2020-05-12 PROCEDURE — G2025 DIS SITE TELE SVCS RHC/FQHC: HCPCS | Performed by: FAMILY MEDICINE

## 2020-05-14 ENCOUNTER — TELEMEDICINE (OUTPATIENT)
Dept: FAMILY MEDICINE CLINIC | Facility: CLINIC | Age: 50
End: 2020-05-14

## 2020-05-14 DIAGNOSIS — U07.1 COVID-19 VIRUS INFECTION: Primary | ICD-10-CM

## 2020-05-14 PROCEDURE — 99213 OFFICE O/P EST LOW 20 MIN: CPT | Performed by: FAMILY MEDICINE

## 2020-05-18 ENCOUNTER — TELEMEDICINE (OUTPATIENT)
Dept: FAMILY MEDICINE CLINIC | Facility: CLINIC | Age: 50
End: 2020-05-18

## 2020-05-18 DIAGNOSIS — U07.1 COVID-19 VIRUS INFECTION: Primary | ICD-10-CM

## 2020-05-18 PROCEDURE — G2025 DIS SITE TELE SVCS RHC/FQHC: HCPCS | Performed by: FAMILY MEDICINE

## 2020-06-02 ENCOUNTER — TELEPHONE (OUTPATIENT)
Dept: FAMILY MEDICINE CLINIC | Facility: CLINIC | Age: 50
End: 2020-06-02

## 2020-06-15 ENCOUNTER — TELEPHONE (OUTPATIENT)
Dept: FAMILY MEDICINE CLINIC | Facility: CLINIC | Age: 50
End: 2020-06-15

## 2020-06-24 ENCOUNTER — TELEPHONE (OUTPATIENT)
Dept: FAMILY MEDICINE CLINIC | Facility: CLINIC | Age: 50
End: 2020-06-24

## 2020-06-25 ENCOUNTER — TELEPHONE (OUTPATIENT)
Dept: FAMILY MEDICINE CLINIC | Facility: CLINIC | Age: 50
End: 2020-06-25

## 2020-06-26 ENCOUNTER — OFFICE VISIT (OUTPATIENT)
Dept: FAMILY MEDICINE CLINIC | Facility: CLINIC | Age: 50
End: 2020-06-26

## 2020-06-26 VITALS
SYSTOLIC BLOOD PRESSURE: 124 MMHG | HEART RATE: 84 BPM | RESPIRATION RATE: 18 BRPM | OXYGEN SATURATION: 95 % | DIASTOLIC BLOOD PRESSURE: 76 MMHG | BODY MASS INDEX: 36.49 KG/M2 | TEMPERATURE: 98.1 F | WEIGHT: 240 LBS

## 2020-06-26 DIAGNOSIS — R09.02 HYPOXIA: Primary | ICD-10-CM

## 2020-06-26 PROCEDURE — 1036F TOBACCO NON-USER: CPT | Performed by: FAMILY MEDICINE

## 2020-06-26 PROCEDURE — 99213 OFFICE O/P EST LOW 20 MIN: CPT | Performed by: FAMILY MEDICINE

## 2020-07-01 ENCOUNTER — TELEPHONE (OUTPATIENT)
Dept: FAMILY MEDICINE CLINIC | Facility: CLINIC | Age: 50
End: 2020-07-01

## 2020-07-01 NOTE — TELEPHONE ENCOUNTER
COVID 19 Plasma Donation Project:    Patient outreached for potential candidate for plasma donation  Patient education on need for individuals who have recovered from [place] 19 and have developed antibodies in their blood to donate plasma  Care Manager explained to patient that the plasma will go to help others currently with moderate to severe COVID 19 symptoms recover  Patient verbalized understanding  Patient tested positive on 4/2/2020  Patient has been asymptomatic for 43 days     Patient agreeable to donate at this time  Jean Lim 44 y o  male   MRN: [id number]  1970    COVID-19 Convalescent Plasma Donor Attestation  Verification of Eligibility    Mayuri Aviles is a 44 y o  male who was diagnosed with COVID-19 infection, has recovered from the illness and wishes to participate in the [facility] convalescent plasma donation program to treat critically ill COVID-19 patients  Donor understands that they will be screened by [address] and if deemed a suitable candidate, donation appointment time will be arranged directly through [address]  They were also informed that their plasma will be in a donor pool and cannot be directed to a specific patient  Patient tested positive for 2019 Novel Coronavirus 2019 (SARS-CoV-2 RNA) on 04/02/2020  To facilitate donation, eligibility form sent directly to [address]  Patients preferred contact number:[phone]    All of the patient's questions were answered via telephone  he is aware to call our office with any further questions or concerns as needed

## 2020-09-26 ENCOUNTER — APPOINTMENT (EMERGENCY)
Dept: RADIOLOGY | Facility: HOSPITAL | Age: 50
End: 2020-09-26
Payer: COMMERCIAL

## 2020-09-26 ENCOUNTER — HOSPITAL ENCOUNTER (EMERGENCY)
Facility: HOSPITAL | Age: 50
Discharge: HOME | End: 2020-09-27
Attending: EMERGENCY MEDICINE | Admitting: EMERGENCY MEDICINE
Payer: COMMERCIAL

## 2020-09-26 DIAGNOSIS — F10.929 ALCOHOL INTOXICATION (HCC): ICD-10-CM
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
## 2020-09-26 PROCEDURE — 99284 EMERGENCY DEPT VISIT MOD MDM: CPT | Performed by: EMERGENCY MEDICINE

## 2020-09-26 PROCEDURE — 72125 CT NECK SPINE W/O DYE: CPT

## 2020-09-26 PROCEDURE — 99284 EMERGENCY DEPT VISIT MOD MDM: CPT

## 2020-09-26 PROCEDURE — G1004 CDSM NDSC: HCPCS

## 2020-09-26 PROCEDURE — 70450 CT HEAD/BRAIN W/O DYE: CPT

## 2020-09-27 VITALS
BODY MASS INDEX: 34.86 KG/M2 | TEMPERATURE: 97.9 F | SYSTOLIC BLOOD PRESSURE: 123 MMHG | HEART RATE: 91 BPM | RESPIRATION RATE: 16 BRPM | OXYGEN SATURATION: 97 % | DIASTOLIC BLOOD PRESSURE: 63 MMHG | WEIGHT: 230 LBS | HEIGHT: 68 IN
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
## 2021-03-10 DIAGNOSIS — Z23 ENCOUNTER FOR IMMUNIZATION: ICD-10-CM

## 2022-04-21 ENCOUNTER — TELEPHONE (OUTPATIENT)
Dept: FAMILY MEDICINE CLINIC | Facility: CLINIC | Age: 52
End: 2022-04-21

## 2022-05-20 ENCOUNTER — OFFICE VISIT (OUTPATIENT)
Dept: FAMILY MEDICINE CLINIC | Facility: CLINIC | Age: 52
End: 2022-05-20

## 2022-05-20 VITALS
WEIGHT: 248.6 LBS | OXYGEN SATURATION: 98 % | DIASTOLIC BLOOD PRESSURE: 79 MMHG | TEMPERATURE: 97.6 F | HEIGHT: 68 IN | RESPIRATION RATE: 18 BRPM | SYSTOLIC BLOOD PRESSURE: 143 MMHG | BODY MASS INDEX: 37.68 KG/M2 | HEART RATE: 89 BPM

## 2022-05-20 DIAGNOSIS — Z12.11 ENCOUNTER FOR SCREENING COLONOSCOPY: ICD-10-CM

## 2022-05-20 DIAGNOSIS — Z72.89 ALCOHOL USE: ICD-10-CM

## 2022-05-20 DIAGNOSIS — R23.2 FLUSHING: ICD-10-CM

## 2022-05-20 DIAGNOSIS — I10 ESSENTIAL (PRIMARY) HYPERTENSION: Primary | ICD-10-CM

## 2022-05-20 PROBLEM — Z78.9 ALCOHOL USE: Status: ACTIVE | Noted: 2022-05-20

## 2022-05-20 PROBLEM — R03.0 ELEVATED BLOOD PRESSURE READING: Status: ACTIVE | Noted: 2022-05-20

## 2022-05-20 PROBLEM — F10.90 ALCOHOL USE: Status: ACTIVE | Noted: 2022-05-20

## 2022-05-20 PROCEDURE — 99213 OFFICE O/P EST LOW 20 MIN: CPT | Performed by: FAMILY MEDICINE

## 2022-05-20 NOTE — ASSESSMENT & PLAN NOTE
Patient binge drinks, sometimes as greater than 10 beers daily   -education regarding importance of alcohol control regarding blood pressure elevation and for general health purposes  -patient states he will decrease drinking at this time  , does not require any help or counseling

## 2022-05-20 NOTE — ASSESSMENT & PLAN NOTE
Patient with history of elevated blood pressure readings in the office, will give formal diagnosis of essential hypertension at this time  BP: 143/79 mmHg, will trial lifestyle modifications and reduction of alcohol use for 6 months  - patient will check BP at home 2x weekly until f/u in 6 months   -lifestyle modifications at this time with lab work ordered

## 2022-05-20 NOTE — PROGRESS NOTES
Assessment/Plan:    Encounter for screening colonoscopy  Referral to GI for colonoscopy  No history of colonoscopy in the past     Alcohol use  Patient binge drinks, sometimes as greater than 10 beers daily   -education regarding importance of alcohol control regarding blood pressure elevation and for general health purposes  -patient states he will decrease drinking at this time  , does not require any help or counseling  Essential (primary) hypertension   Patient with history of elevated blood pressure readings in the office, will give formal diagnosis of essential hypertension at this time  BP: 143/79 mmHg, will trial lifestyle modifications and reduction of alcohol use for 6 months  - patient will check BP at home 2x weekly until f/u in 6 months   -lifestyle modifications at this time with lab work ordered  [place]  Related to excessive drinking elevated blood pressure reading, see principal problem  Problem List Items Addressed This Visit          Cardiovascular and Mediastinum    Flushing     Related to excessive drinking elevated blood pressure reading, see principal problem  Relevant Orders    Comprehensive metabolic panel    TSH, 3rd generation with Free T4 reflex    Essential (primary) hypertension  - Primary     Patient with history of elevated blood pressure readings in the office, will give formal diagnosis of essential hypertension at this time  BP: 143/79 mmHg, will trial lifestyle modifications and reduction of alcohol use for 6 months  - patient will check BP at home 2x weekly until f/u in 6 months   -lifestyle modifications at this time with lab work ordered             Relevant Orders    Comprehensive metabolic panel    CBC and Platelet    TSH, 3rd generation with Free T4 reflex       Other    Alcohol use     Patient binge drinks, sometimes as greater than 10 beers daily   -education regarding importance of alcohol control regarding blood pressure elevation and for general health purposes  -patient states he will decrease drinking at this time  , does not require any help or counseling  Relevant Orders    Comprehensive metabolic panel    CBC and Platelet    Encounter for screening colonoscopy     Referral to GI for colonoscopy  No history of colonoscopy in the past            Relevant Orders    Ambulatory Referral to Gastroenterology              Subjective:      Patient ID: Vicky Staley is a 46 y o  male  Hypertension  This is a new problem  The current episode started today  The problem is unchanged  The problem is uncontrolled  Associated symptoms include headaches  Pertinent negatives include no anxiety, blurred vision, chest pain, malaise/fatigue, neck pain, orthopnea, palpitations, peripheral edema, PND, shortness of breath or sweats  There are no associated agents to hypertension  Risk factors for coronary artery disease include dyslipidemia, family history, obesity, male gender, sedentary lifestyle and smoking/tobacco exposure (alcohol use)  Past treatments include nothing  There are no compliance problems  There is no history of angina or kidney disease  There is no history of chronic renal disease  The following portions of the patient's history were reviewed and updated as appropriate: allergies, current medications, past family history, past medical history, past social history, past surgical history and problem list     Review of Systems   Constitutional: Negative for chills, fever and malaise/fatigue  HENT: Negative for ear pain and sore throat  Eyes: Negative for blurred vision, pain and visual disturbance  Respiratory: Negative for cough and shortness of breath  Cardiovascular: Negative for chest pain, palpitations, orthopnea and PND  Gastrointestinal: Negative for abdominal pain and vomiting  Genitourinary: Negative for dysuria and hematuria  Musculoskeletal: Negative for arthralgias, back pain and neck pain     Skin: Negative for color change and rash  Neurological: Positive for headaches  Negative for seizures and syncope  All other systems reviewed and are negative  Objective:      /79 (BP Location: Left arm, Patient Position: Sitting, Cuff Size: Large)   Pulse 89   Temp 97 6 °F (36 4 °C) (Temporal)   Resp 18   Ht 5' 8" (1 727 m)   Wt 113 kg (248 lb 9 6 oz)   SpO2 98%   BMI 37 80 kg/m²          Physical Exam  Vitals reviewed  Constitutional:       General: He is not in acute distress  Appearance: Normal appearance  He is normal weight  He is not ill-appearing  HENT:      Head: Normocephalic and atraumatic  Nose: Nose normal  No congestion  Eyes:      General: No scleral icterus  Conjunctiva/sclera: Conjunctivae normal       Pupils: Pupils are equal, round, and reactive to light  Cardiovascular:      Rate and Rhythm: Normal rate and regular rhythm  Pulses: Normal pulses  Heart sounds: Normal heart sounds  No murmur heard  Pulmonary:      Effort: Pulmonary effort is normal       Breath sounds: Normal breath sounds  No wheezing  Chest:      Chest wall: No tenderness  Abdominal:      General: Abdomen is flat  Bowel sounds are normal       Palpations: There is no mass  Tenderness: There is no abdominal tenderness  Musculoskeletal:         General: No tenderness  Normal range of motion  Cervical back: Normal range of motion  Right lower leg: No edema  Left lower leg: No edema  Skin:     General: Skin is warm  Capillary Refill: Capillary refill takes less than 2 seconds  Findings: No bruising or rash  Neurological:      Mental Status: He is alert and oriented to person, place, and time     Psychiatric:         Mood and Affect: Mood normal          Behavior: Behavior normal          Fredis Arvizu DO  Family Medicine Resident, PGY2  1:44 PM

## 2022-05-20 NOTE — PATIENT INSTRUCTIONS
DASH Eating Plan   WHAT YOU NEED TO KNOW:   What is the DASH Eating Plan? The DASH (Dietary Approaches to Stop Hypertension) Eating Plan is designed to help prevent or lower high blood pressure  It can also help to lower LDL (bad) cholesterol and decrease your risk for heart disease  The plan is low in sodium, sugar, unhealthy fats, and total fat  It is high in potassium, calcium, magnesium, and fiber  These nutrients are added when you eat more fruits, vegetables, and whole grains  With the DASH eating plan, you need to eat a certain number of servings from each food group  This will help you get enough of certain nutrients and limit others  The amount of servings you should eat depends on how many calories you need  Your dietitian can help you create meal plans with the right number of servings for each food group  What do I need to know about sodium? Your dietitian will tell you how much sodium is safe for you to have each day  People with high blood pressure should have no more than 1,500 to 2,300 mg of sodium in a day  A teaspoon (tsp) of salt has 2,300 mg of sodium  This may seem like a difficult goal, but small changes to the foods you eat can make a big difference  Your healthcare provider or dietitian can help you create a meal plan that follows your sodium limit  Read food labels  Food labels can help you choose foods that are low in sodium  The amount of sodium is listed in milligrams (mg)  The % Daily Value (DV) column tells you how much of your daily needs are met by 1 serving of the food for each nutrient listed  Choose foods that have less than 5% of the DV of sodium  These foods are considered low in sodium  Foods that have 20% or more of the DV of sodium are considered high in sodium  Avoid foods that have more than 300 mg of sodium in each serving  Choose foods that say low-sodium, reduced-sodium, or no salt added on the food label  Limit added salt    Do not salt food at the table if you add salt when you cook  Use herbs and spices, such as onions, garlic, and salt-free seasonings to add flavor  Try lemon or lime juice or vinegar to add a tart flavor  Use hot peppers or a small amount of hot pepper sauce to add a spicy flavor  Limit foods high in added salt, such as the following:    Seasonings made with salt, such as garlic salt, celery salt, onion salt, seasoned salt, meat tenderizers, and monosodium glutamate (MSG)    Miso soup and canned or dried soup mixes    Regular soy sauce, barbecue sauce, teriyaki sauce, steak sauce, Worcestershire sauce, and most flavored vinegars    Snack foods, such as salted chips, popcorn, pretzels, pork rinds, salted crackers, and salted nuts    Frozen foods, such as dinners, entrees, vegetables with sauces, and breaded meats    Ask about salt substitutes  Ask your healthcare provider if you may use salt substitutes  Some salt substitutes have ingredients that can be harmful if you have certain health conditions  Choose foods carefully at restaurants  Meals from restaurants, especially fast food restaurants, are often high in sodium  Some restaurants have nutrition information that tells you the amount of sodium in their foods  Ask to have your food prepared with less, or no salt  What do I need to know about fats? Healthy fats include unsaturated fats and omega-3 fatty acids  Unhealthy fats include saturated fats and trans fats    Include healthy fats, such as the following:      Cooking oils, such as soybean, canola, olive, or sunflower    Fatty fish, such as salmon, tuna, mackerel, or sardines    Flaxseed oil or ground flaxseed    ½ cup of cooked beans, such as black beans, kidney beans, or chu beans    1½ ounces of low-sodium nuts, such as almonds or walnuts    Low-sugar, low-sodium peanut butter    Seeds such as lana seeds or sunflower seeds       Limit or do not have unhealthy fats, such as the following:      Foods that contain fat from animals, such as fatty meats, whole milk, butter, and cream    Shortening, stick margarine, palm oil, and coconut oil    Full-fat or creamy salad dressing    Creamy soup    Crackers, chips, and baked goods made with margarine or shortening    Foods that are fried in unhealthy fats    Gravy and sauces, such as Kade or cheese sauces    What do I need to know about carbohydrates (carbs)? All carbs break down into sugar  Complex carbs contain more fiber than simple carbs  This means complex carbs go into the bloodstream more slowly and cause less of a blood sugar spike  Try to include more complex carbs and fewer simple carbs  Include complex carbs, such as the followin slice of whole-grain bread    1 ounce of dry cereal that does not contain added sugar    ½ cup of cooked oatmeal    2 ounces of cooked whole-grain pasta    ½ cup of cooked brown rice    Limit or do not have simple carbs, such as the following:      [employer], such as doughnuts, pastries, and cookies    Mixes for cornbread and biscuits    White rice and pasta mixes, such as boxed macaroni and cheese    Instant and cold cereals that contain sugar    Jelly, jam, and ice cream that contain sugar    Condiments such as ketchup    Drinks high in sugar, such as soft drinks, lemonade, and fruit juice    What do I need to know about vegetables and fruits? Vegetables and fruits can be fresh, frozen, or canned  If possible, try to choose low-sodium canned options    Include a variety of vegetables and fruits, such as the followin medium apple, pear, or peach (about ½ cup chopped)    ½ small banana    ½ cup berries, such as blueberries, strawberries, or blackberries    1 cup of raw leafy greens, such as lettuce, spinach, kale, or mendy greens    ½ cup of frozen or canned (no added salt) vegetables, such as green beans    ½ cup of fresh, frozen, or canned fruit (canned in light syrup or fruit juice)    ½ cup of vegetable or fruit juice    Limit or do not have vegetables and fruits made in the following ways:      Frozen fruit such as cherries that have added sugar    Fruit in cream or butter sauce    Canned vegetables that are high in sodium    Sauerkraut, pickled vegetables, and other foods prepared in brine    Fried vegetables or vegetables in butter or high-fat sauces    What do I need to know about protein foods? Include lean or low-fat protein foods, such as the following:      Poultry (chicken, turkey) with no skin    Fish (especially fatty fish, such as salmon, fresh tuna, or mackerel)    Lean beef and pork (loin, round, extra lean hamburger)    Egg whites and egg substitutes    1 cup of nonfat (skim) or 1% milk    1½ ounces of fat-free or low-fat cheese    6 ounces of nonfat or low-fat yogurt    Limit or do not have high-fat protein foods, such as the following:      Smoked or cured meat, such as corned beef, knight, ham, hot dogs, and sausage    Canned beans and canned meats or spreads, such as potted meats, sardines, anchovies, and imitation seafood    Deli or lunch meats, such as bologna, ham, turkey, and roast beef    High-fat meat (T-bone steak, regular hamburger, and ribs)    Whole eggs and egg yolks    Whole milk, 2% milk, and cream    Regular cheese and processed cheese    What other guidelines should I follow? Maintain a healthy weight  Your risk for heart disease is higher if you are overweight  Your healthcare provider may suggest that you lose weight if you are overweight  You can lose weight by eating fewer calories and foods that have added sugars and fat  The DASH meal plan can help you do this  Decrease calories by eating smaller portions at each meal and fewer snacks  Ask your healthcare provider for more information about how to lose weight  Exercise regularly  Regular exercise can help you reach or maintain a healthy weight   Regular exercise can also help decrease your blood pressure and improve your cholesterol levels  Get 30 minutes or more of moderate exercise each day of the week  To lose weight, get at least 60 minutes of exercise  Talk to your healthcare provider about the best exercise program for you  Limit alcohol  Women should limit alcohol to 1 drink a day  Men should limit alcohol to 2 drinks a day  A drink of alcohol is 12 ounces of beer, 5 ounces of wine, or 1½ ounces of liquor  Where can I find more information? [employer] [facility] and [address]  [address] [address]  [place] [address]  Phone: [phone]  Web Address: [facility] no    CARE AGREEMENT:   You have the right to help plan your care  Discuss treatment options with your healthcare provider to decide what care you want to receive  You always have the right to refuse treatment  The above information is an  only  It is not intended as medical advice for individual conditions or treatments  Talk to your doctor, nurse or pharmacist before following any medical regimen to see if it is safe and effective for you  © Copyright [facility] 2022 Information is for End User's use only and may not be sold, redistributed or otherwise used for commercial purposes   All illustrations and images included in CareNotes® are the copyrighted property of [employer]  or [address] [place] [address]

## 2022-05-20 NOTE — ASSESSMENT & PLAN NOTE
Patient stated he had elevated blood pressure reading to her vacation last Saturday, blood pressure was 180/100 mmHg  Patient had flushing very mild chest tightness and headaches  Patient was also binge drinking at that time  -currently asymptomatic , blood pressure 143/79 mmHg   -handout given regarding-diet  -3 month follow-up with labs and lifestyle modifications only  If worse will initiate antihypertensive therapy

## 2022-06-08 ENCOUNTER — TELEPHONE (OUTPATIENT)
Dept: GASTROENTEROLOGY | Facility: CLINIC | Age: 52
End: 2022-06-08

## 2022-06-08 NOTE — TELEPHONE ENCOUNTER
Scheduled date of colonoscopy (as of today):07 29 22  Physician performing colonoscopy:DR HIGGINS  [facility] of colonoscopy:[facility]  Bowel prep reviewed with patient:DULCOLAX/MIRALAX  Instructions reviewed with patient by:PHUC VERBALLY/MAILED  Clearances: N/A      06/08/22  Screened by: Cachorro Vásquez    Referring Provider   John Brandon, DO    Pre- Screening: Body mass index is 37 8 kg/m²  Has patient been referred for a routine screening Colonoscopy? yes  Is the patient between 39-70 years old? yes      Previous Colonoscopy no   If yes:    Date:     Facility:     Reason:       SCHEDULING STAFF: If the patient is between 45yrs-49yrs, please advise patient to confirm benefits/coverage with their insurance company for a routine screening colonoscopy, some insurance carriers will only cover at [address] or older  If the patient is over 66years old, please schedule an office visit  Does the patient want to see a Gastroenterologist prior to their procedure OR are they having any GI symptoms? no    Has the patient been hospitalized or had abdominal surgery in the past 6 months? no    Does the patient use supplemental oxygen? no    Does the patient take Coumadin, Lovenox, Plavix, Elliquis, Xarelto, or other blood thinning medication? no    Has the patient had a stroke, cardiac event, or stent placed in the past year? no    SCHEDULING STAFF: If patient answers NO to above questions, then schedule procedure  If patient answers YES to above questions, then schedule office appointment  If patient is between 45yrs - 49yrs, please advise patient that we will have to confirm benefits & coverage with their insurance company for a routine screening colonoscopy        Passed oa

## 2022-07-26 ENCOUNTER — APPOINTMENT (OUTPATIENT)
Dept: LAB | Facility: CLINIC | Age: 52
End: 2022-07-26
Payer: MEDICARE

## 2022-07-26 DIAGNOSIS — Z78.9 ALCOHOL USE: ICD-10-CM

## 2022-07-26 DIAGNOSIS — R23.2 FLUSHING: ICD-10-CM

## 2022-07-26 DIAGNOSIS — I10 ESSENTIAL (PRIMARY) HYPERTENSION: ICD-10-CM

## 2022-07-26 LAB
ALBUMIN SERPL BCP-MCNC: 3.6 G/DL (ref 3.5–5)
ALP SERPL-CCNC: 44 U/L (ref 46–116)
ALT SERPL W P-5'-P-CCNC: 37 U/L (ref 12–78)
ANION GAP SERPL CALCULATED.3IONS-SCNC: 4 MMOL/L (ref 4–13)
AST SERPL W P-5'-P-CCNC: 23 U/L (ref 5–45)
BILIRUB SERPL-MCNC: 0.57 MG/DL (ref 0.2–1)
BUN SERPL-MCNC: 14 MG/DL (ref 5–25)
CALCIUM SERPL-MCNC: 9.4 MG/DL (ref 8.3–10.1)
CHLORIDE SERPL-SCNC: 106 MMOL/L (ref 96–108)
CO2 SERPL-SCNC: 29 MMOL/L (ref 21–32)
CREAT SERPL-MCNC: 0.84 MG/DL (ref 0.6–1.3)
ERYTHROCYTE [DISTWIDTH] IN BLOOD BY AUTOMATED COUNT: 12.7 % (ref 11.6–15.1)
GFR SERPL CREATININE-BSD FRML MDRD: 99 ML/MIN/1.73SQ M
GLUCOSE P FAST SERPL-MCNC: 105 MG/DL (ref 65–99)
HCT VFR BLD AUTO: 49 % (ref 36.5–49.3)
HGB BLD-MCNC: 15.7 G/DL (ref 12–17)
MCH RBC QN AUTO: 28.8 PG (ref 26.8–34.3)
MCHC RBC AUTO-ENTMCNC: 32 G/DL (ref 31.4–37.4)
MCV RBC AUTO: 90 FL (ref 82–98)
PLATELET # BLD AUTO: 302 THOUSANDS/UL (ref 149–390)
PMV BLD AUTO: 9.9 FL (ref 8.9–12.7)
POTASSIUM SERPL-SCNC: 4.2 MMOL/L (ref 3.5–5.3)
PROT SERPL-MCNC: 7.3 G/DL (ref 6.4–8.4)
RBC # BLD AUTO: 5.45 MILLION/UL (ref 3.88–5.62)
SODIUM SERPL-SCNC: 139 MMOL/L (ref 135–147)
TSH SERPL DL<=0.05 MIU/L-ACNC: 1.78 UIU/ML (ref 0.45–4.5)
WBC # BLD AUTO: 7.43 THOUSAND/UL (ref 4.31–10.16)

## 2022-07-26 PROCEDURE — 36415 COLL VENOUS BLD VENIPUNCTURE: CPT

## 2022-07-26 PROCEDURE — 84443 ASSAY THYROID STIM HORMONE: CPT

## 2022-07-26 PROCEDURE — 85027 COMPLETE CBC AUTOMATED: CPT

## 2022-07-26 PROCEDURE — 80053 COMPREHEN METABOLIC PANEL: CPT

## 2022-07-29 ENCOUNTER — ANESTHESIA EVENT (OUTPATIENT)
Dept: GASTROENTEROLOGY | Facility: MEDICAL CENTER | Age: 52
End: 2022-07-29

## 2022-07-29 ENCOUNTER — ANESTHESIA (OUTPATIENT)
Dept: GASTROENTEROLOGY | Facility: MEDICAL CENTER | Age: 52
End: 2022-07-29

## 2022-07-29 ENCOUNTER — HOSPITAL ENCOUNTER (OUTPATIENT)
Dept: GASTROENTEROLOGY | Facility: MEDICAL CENTER | Age: 52
Setting detail: OUTPATIENT SURGERY
Discharge: HOME | End: 2022-07-29
Admitting: INTERNAL MEDICINE
Payer: MEDICARE

## 2022-07-29 VITALS
WEIGHT: 230 LBS | DIASTOLIC BLOOD PRESSURE: 78 MMHG | RESPIRATION RATE: 20 BRPM | HEIGHT: 68 IN | BODY MASS INDEX: 34.86 KG/M2 | OXYGEN SATURATION: 98 % | SYSTOLIC BLOOD PRESSURE: 134 MMHG | TEMPERATURE: 97.8 F | HEART RATE: 68 BPM

## 2022-07-29 DIAGNOSIS — Z12.11 SPECIAL SCREENING FOR MALIGNANT NEOPLASMS, COLON: ICD-10-CM

## 2022-07-29 PROCEDURE — G0121 COLON CA SCRN NOT HI RSK IND: HCPCS | Performed by: INTERNAL MEDICINE

## 2022-07-29 RX ORDER — SODIUM CHLORIDE 9 MG/ML
125 INJECTION, SOLUTION INTRAVENOUS CONTINUOUS
Status: DISCONTINUED | OUTPATIENT
Start: 2022-07-29 | End: 2022-08-02 | Stop reason: HOSPADM

## 2022-07-29 RX ORDER — PROPOFOL 10 MG/ML
INJECTION, EMULSION INTRAVENOUS AS NEEDED
Status: DISCONTINUED | OUTPATIENT
Start: 2022-07-29 | End: 2022-07-29

## 2022-07-29 RX ORDER — SODIUM CHLORIDE 9 MG/ML
125 INJECTION, SOLUTION INTRAVENOUS CONTINUOUS
Status: CANCELLED | OUTPATIENT
Start: 2022-07-29

## 2022-07-29 RX ADMIN — PROPOFOL 100 MG: 10 INJECTION, EMULSION INTRAVENOUS at 12:04

## 2022-07-29 RX ADMIN — PROPOFOL 150 MG: 10 INJECTION, EMULSION INTRAVENOUS at 11:51

## 2022-07-29 RX ADMIN — SODIUM CHLORIDE 125 ML/HR: 0.9 INJECTION, SOLUTION INTRAVENOUS at 11:47

## 2022-07-29 RX ADMIN — PROPOFOL 50 MG: 10 INJECTION, EMULSION INTRAVENOUS at 11:53

## 2022-07-29 NOTE — H&P
History and Physical - [facility]  Hoa Lim 45 y o  male MRN: [id number]                  HPI: Janee Muller is a 45y o  year old male who presents for colonoscopy for colon cancer screening  REVIEW OF SYSTEMS: Per the HPI, and otherwise unremarkable  Historical Information   Past Medical History:   Diagnosis Date    COVID-19      History reviewed  No pertinent surgical history  Social History   Social History     Substance and Sexual Activity   Alcohol Use Yes    Comment: weekends     Social History     Substance and Sexual Activity   Drug Use Never     Social History     Tobacco Use   Smoking Status Former Smoker    Types: Cigarettes   Smokeless Tobacco Never Used     Family History   Problem Relation Age of Onset    No Known Problems Mother     No Known Problems Father        Meds/Allergies     (Not in a hospital admission)      No Known Allergies    Objective     Blood pressure 148/94, pulse 73, temperature 97 8 °F (36 6 °C), temperature source Temporal, resp  rate 20, height 5' 8" (1 727 m), weight 104 kg (230 lb), SpO2 97 %  PHYSICAL EXAM    Gen: NAD  CV: RRR  CHEST: Clear  ABD: soft, NT/ND  EXT: no edema      ASSESSMENT/PLAN:  Janee Muller is a 45y o  year old male who presents for colonoscopy for colon cancer screening  The patient is stable and optimized for the procedure, we reviewed risk and benefits  Risk include but not limited to infection, bleeding, perforation and missing a lesion

## 2022-07-29 NOTE — ANESTHESIA PREPROCEDURE EVALUATION
Procedure:  COLONOSCOPY    Relevant Problems   CARDIO   (+) Essential (primary) hypertension       Other   (+) Alcohol use        Physical Exam    Airway    Mallampati score: II  TM Distance: >3 FB  Neck ROM: full     Dental       Cardiovascular  Rhythm: regular, Rate: normal,     Pulmonary  Breath sounds clear to auscultation,     Other Findings        Anesthesia Plan  ASA Score- 2     Anesthesia Type- IV sedation with anesthesia with ASA Monitors  Additional Monitors:   Airway Plan:           Plan Factors-Exercise tolerance (METS): >4 METS  Chart reviewed  Existing labs reviewed  Patient summary reviewed  Patient is not a current smoker  Patient not instructed to abstain from smoking on day of procedure  Patient did not smoke on day of surgery  Obstructive sleep apnea risk education given perioperatively  Induction- intravenous  Postoperative Plan-     Informed Consent- Anesthetic plan and risks discussed with patient

## 2022-08-31 ENCOUNTER — RA CDI HCC (OUTPATIENT)
Dept: OTHER | Facility: HOSPITAL | Age: 52
End: 2022-08-31

## 2022-08-31 NOTE — PROGRESS NOTES
[address]  coding opportunities       Chart reviewed, no opportunity found: CHART REVIEWED, NO OPPORTUNITY FOUND        Patients Insurance     Medicare Insurance: Medicare

## 2022-09-06 ENCOUNTER — TELEPHONE (OUTPATIENT)
Dept: FAMILY MEDICINE CLINIC | Facility: CLINIC | Age: 52
End: 2022-09-06

## 2023-04-28 ENCOUNTER — OFFICE VISIT (OUTPATIENT)
Dept: FAMILY MEDICINE CLINIC | Facility: CLINIC | Age: 53
End: 2023-04-28

## 2023-04-28 VITALS
RESPIRATION RATE: 20 BRPM | SYSTOLIC BLOOD PRESSURE: 144 MMHG | BODY MASS INDEX: 37.13 KG/M2 | WEIGHT: 245 LBS | TEMPERATURE: 97.9 F | DIASTOLIC BLOOD PRESSURE: 70 MMHG | HEIGHT: 68 IN | OXYGEN SATURATION: 96 % | HEART RATE: 91 BPM

## 2023-04-28 DIAGNOSIS — F41.9 ANXIETY: Primary | ICD-10-CM

## 2023-04-28 RX ORDER — HYDROXYZINE HYDROCHLORIDE 25 MG/1
25 TABLET, FILM COATED ORAL EVERY 6 HOURS PRN
Qty: 90 TABLET | Refills: 0 | Status: SHIPPED | OUTPATIENT
Start: 2023-04-28

## 2023-04-28 RX ORDER — SERTRALINE HYDROCHLORIDE 25 MG/1
25 TABLET, FILM COATED ORAL DAILY
Qty: 90 TABLET | Refills: 0 | Status: SHIPPED | OUTPATIENT
Start: 2023-04-28

## 2023-04-28 NOTE — ASSESSMENT & PLAN NOTE
Chronic history of anxiety since his young 25s, previously managed with counseling and medications but had to stop due to loss of insurance  CODY-7 score of 14 (moderate anxiety)  · Discussed different interventions - patient is willing to try both medical management and therapy  · Will start with Zoloft 25mg daily, discussed side-effects  · Social work referral placed for outpatient [facility] resources

## 2023-04-28 NOTE — PROGRESS NOTES
Name: Rachelle Young      : 1970      MRN: [id number]  Encounter Provider: Nighat Merlos MD  Encounter Date: 2023   Encounter department: [address] [place] [address]  Anxiety  Assessment & Plan:  Chronic history of anxiety since his young 23s, previously managed with counseling and medications but had to stop due to loss of insurance  CODY-7 score of 14 (moderate anxiety)  · Discussed different interventions - patient is willing to try both medical management and therapy  · Will start with Zoloft 25mg daily, discussed side-effects  · Social work referral placed for outpatient [place] [address] resources    Orders:  -     sertraline (Zoloft) 25 mg tablet; Take 1 tablet (25 mg total) by mouth daily  -     hydrOXYzine HCL (ATARAX) 25 mg tablet; Take 1 tablet (25 mg total) by mouth every 6 (six) hours as needed for itching  -     Ambulatory Referral to Social Work Care Management Program; Future         Subjective     He reports he was diagnosed with anxiety in his early 25s and he saw both a therapist and was started on medical therapy (Xanax); however, he lost his insurance and had to discontinue therapy  Patient reports he went to [address] [place] [address] 2 years ago with his family and one of his triggers is being in large places with a lot of people  Other triggers include confrontations, talking with authority figures  Denies any SI/HI  He is coming for evaluation today because he feels like he's been feeling this way too long and he can't do the things he wants to enjoy (ie  Enjoying time and games with his daughter)  He is also anxious about being on jury duty in   Review of Systems   Constitutional: Negative for chills and fever  HENT: Negative for ear pain and sore throat  Eyes: Negative for pain and visual disturbance  Respiratory: Negative for cough and shortness of breath  Cardiovascular: Negative for chest pain and palpitations     Gastrointestinal: Negative "for abdominal pain and vomiting  Genitourinary: Negative for dysuria and hematuria  Musculoskeletal: Negative for arthralgias and back pain  Skin: Negative for color change and rash  Neurological: Negative for seizures and syncope  Psychiatric/Behavioral: Negative for self-injury and suicidal ideas  The patient is nervous/anxious  All other systems reviewed and are negative  Current Outpatient Medications on File Prior to Visit   Medication Sig   • fluticasone-salmeterol (ADVAIR, WIXELA) 250-50 mcg/dose inhaler Inhale 1 puff 2 (two) times a day Rinse mouth after use  (Patient not taking: Reported on 5/20/2022)       Objective     /70 (BP Location: Left arm, Patient Position: Sitting, Cuff Size: Large)   Pulse 91   Temp 97 9 °F (36 6 °C) (Temporal)   Resp 20   Ht 5' 8\" (1 727 m)   Wt 111 kg (245 lb)   SpO2 96%   BMI 37 25 kg/m²     Physical Exam  Vitals reviewed  Constitutional:       General: He is not in acute distress  Appearance: Normal appearance  HENT:      Head: Normocephalic and atraumatic  Nose: Nose normal    Eyes:      Extraocular Movements: Extraocular movements intact  Conjunctiva/sclera: Conjunctivae normal    Cardiovascular:      Rate and Rhythm: Normal rate and regular rhythm  Heart sounds: No murmur heard  Pulmonary:      Effort: Pulmonary effort is normal  No respiratory distress  Breath sounds: Normal breath sounds  Musculoskeletal:         General: Normal range of motion  Cervical back: Normal range of motion and neck supple  Neurological:      Mental Status: He is alert and oriented to person, place, and time  Psychiatric:         Mood and Affect: Mood is anxious         Pat Faustin MD  "

## 2023-06-01 ENCOUNTER — TELEPHONE (OUTPATIENT)
Dept: FAMILY MEDICINE CLINIC | Facility: CLINIC | Age: 53
End: 2023-06-01

## 2023-08-07 ENCOUNTER — PATIENT OUTREACH (OUTPATIENT)
Dept: FAMILY MEDICINE CLINIC | Facility: CLINIC | Age: 53
End: 2023-08-07

## 2023-08-07 NOTE — PROGRESS NOTES
SW CM referral received 4/28 due to pt's anxiety. Chart review completed. Per chart review, pt has chronic hx of anxiety since his young 23s. Per chart, pt previously managed this with counseling and medication, but had to stop due to loss of insurance. Per chart, pt is willing to try both medical management and therapy at this time. Per chart, pt started on Zoloft 25mg daily. Per chart, pt reports one of his triggers being in large places with a lot of people and other triggers include confrontation and talking with authority figures. Per chart, pt denies and SI/HI at this time. Per chart, pt was being seen as he felt he's been feeling this way too long and can't do things he wants to enjoy. OP SWCM called pt introducing self, role and reason for calling. OP SWCM confirmed that pt is still interested in [facility] resource list. Pt asked that OP [facility] mail it to him. OP SWCM mailed out information with [facility] resources. OP SWCM advised pt to reach out with any other needs and pt declined further assistance at this time. OP SWCM to close referral as needs were met in providing [facility] list to pt.

## 2023-11-30 ENCOUNTER — OFFICE VISIT (OUTPATIENT)
Dept: FAMILY MEDICINE CLINIC | Facility: CLINIC | Age: 53
End: 2023-11-30

## 2023-11-30 VITALS
WEIGHT: 242.4 LBS | DIASTOLIC BLOOD PRESSURE: 88 MMHG | RESPIRATION RATE: 20 BRPM | BODY MASS INDEX: 36.86 KG/M2 | HEART RATE: 76 BPM | SYSTOLIC BLOOD PRESSURE: 143 MMHG | TEMPERATURE: 98.2 F

## 2023-11-30 DIAGNOSIS — J06.9 VIRAL UPPER RESPIRATORY TRACT INFECTION: Primary | ICD-10-CM

## 2023-11-30 LAB
SARS-COV-2 AG UPPER RESP QL IA: NEGATIVE
VALID CONTROL: NORMAL

## 2023-11-30 PROCEDURE — 87811 SARS-COV-2 COVID19 W/OPTIC: CPT | Performed by: FAMILY MEDICINE

## 2023-11-30 PROCEDURE — 99213 OFFICE O/P EST LOW 20 MIN: CPT | Performed by: FAMILY MEDICINE

## 2023-11-30 RX ORDER — FLUTICASONE PROPIONATE 50 MCG
1 SPRAY, SUSPENSION (ML) NASAL DAILY
Qty: 9.9 ML | Refills: 0 | Status: SHIPPED | OUTPATIENT
Start: 2023-11-30

## 2023-11-30 NOTE — PROGRESS NOTES
Name: Emperatriz Bustillos      : 1970      MRN: [id number]  Encounter Provider: Corie Williamson DO  Encounter Date: 2023   Encounter department: [address]     1. Viral upper respiratory tract infection  Assessment & Plan:  POCT Covid negative  - supportive care with hydration, rest and flonase   - follow up if condition worsens or fails to improve     Orders:  -     POCT Rapid Covid Ag  -     fluticasone (FLONASE) 50 mcg/act nasal spray; 1 spray into each nostril daily        Depression Screening and Follow-up Plan: Patient was screened for depression during today's encounter. They screened negative with a PHQ-2 score of 2. Subjective     URI   This is a new problem. The current episode started in the past 7 days. The problem has been gradually improving. There has been no fever. Associated symptoms include congestion, coughing, rhinorrhea, sneezing and a sore throat. Pertinent negatives include no abdominal pain, chest pain, diarrhea, dysuria, headaches, nausea, plugged ear sensation, rash or vomiting. Treatments tried: nyquil, vicks, cough drops. The treatment provided moderate relief. Review of Systems   HENT:  Positive for congestion, rhinorrhea, sneezing and sore throat. Respiratory:  Positive for cough. Cardiovascular:  Negative for chest pain. Gastrointestinal:  Negative for abdominal pain, diarrhea, nausea and vomiting. Genitourinary:  Negative for dysuria. Skin:  Negative for rash. Neurological:  Negative for headaches. Past Medical History:   Diagnosis Date    COVID-19      No past surgical history on file.   Family History   Problem Relation Age of Onset    No Known Problems Mother     No Known Problems Father      Social History     Socioeconomic History    Marital status: Single     Spouse name: None    Number of children: None    Years of education: None    Highest education level: None Occupational History    None   Tobacco Use    Smoking status: Former     Types: Cigarettes    Smokeless tobacco: Never   Vaping Use    Vaping Use: Never used   Substance and Sexual Activity    Alcohol use: Yes     Comment: weekends    Drug use: Never    Sexual activity: Not Currently     Partners: Female   Other Topics Concern    None   Social History Narrative    None     Social Determinants of Health     Financial Resource Strain: Low Risk  (11/30/2023)    Overall Financial Resource Strain (CARDIA)     Difficulty of Paying Living Expenses: Not hard at all   Food Insecurity: No Food Insecurity (11/30/2023)    Hunger Vital Sign     Worried About Running Out of Food in the Last Year: Never true     Ran Out of Food in the Last Year: Never true   Transportation Needs: No Transportation Needs (11/30/2023)    PRAPARE - Transportation     Lack of Transportation (Medical): No     Lack of Transportation (Non-Medical): No   Physical Activity: Not on file   Stress: No Stress Concern Present (11/30/2023)    [facility]     Feeling of Stress : Only a little   Social Connections: Not on file   Intimate Partner Violence: Not At Risk (11/30/2023)    Humiliation, Afraid, Rape, and Kick questionnaire     Fear of Current or Ex-Partner: No     Emotionally Abused: No     Physically Abused: No     Sexually Abused: No   Housing Stability: Low Risk  (11/30/2023)    Housing Stability Vital Sign     Unable to Pay for Housing in the Last Year: No     Number of [address] in the Last Year: 1     Unstable Housing in the Last Year: No     Current Outpatient Medications on File Prior to Visit   Medication Sig    fluticasone-salmeterol (ADVAIR, WIXELA) 250-50 mcg/dose inhaler Inhale 1 puff 2 (two) times a day Rinse mouth after use.  (Patient not taking: Reported on 5/20/2022)    hydrOXYzine HCL (ATARAX) 25 mg tablet Take 1 tablet (25 mg total) by mouth every 6 (six) hours as needed for itching (Patient not taking: Reported on 11/30/2023)    sertraline (Zoloft) 25 mg tablet Take 1 tablet (25 mg total) by mouth daily (Patient not taking: Reported on 11/30/2023)     No Known Allergies  Immunization History   Administered Date(s) Administered    COVID-19 PFIZER VACCINE 0.3 ML IM 04/10/2021, 05/01/2021    COVID-19 Pfizer Vac BIVALENT Helio-sucrose 12 Yr+ IM 01/09/2023    COVID-19 Pfizer vac (Helio-sucrose, gray cap) 12 yr+ IM 01/14/2022       Objective     /88   Pulse 76   Temp 98.2 °F (36.8 °C)   Resp 20   Wt 110 kg (242 lb 6.4 oz)   BMI 36.86 kg/m²     Physical Exam  Constitutional:       General: He is not in acute distress. Appearance: He is not ill-appearing or toxic-appearing. HENT:      Head: Normocephalic and atraumatic. Right Ear: Tympanic membrane, ear canal and external ear normal.      Left Ear: Tympanic membrane, ear canal and external ear normal.      Nose: Congestion present. Mouth/Throat:      Mouth: Mucous membranes are moist.      Pharynx: Oropharynx is clear. No oropharyngeal exudate or posterior oropharyngeal erythema. Eyes:      General: No scleral icterus. Right eye: No discharge. Left eye: No discharge. Extraocular Movements: Extraocular movements intact. Conjunctiva/sclera: Conjunctivae normal.   Cardiovascular:      Rate and Rhythm: Normal rate and regular rhythm. Pulmonary:      Effort: Pulmonary effort is normal. No respiratory distress. Breath sounds: Normal breath sounds. No wheezing or rales. Abdominal:      General: Bowel sounds are normal.      Palpations: Abdomen is soft. Tenderness: There is no abdominal tenderness. Musculoskeletal:      Right lower leg: No edema. Left lower leg: No edema. Skin:     General: Skin is warm and dry. Neurological:      General: No focal deficit present. Mental Status: He is alert. Motor: No weakness.       Gait: Gait normal.   Psychiatric: Mood and Affect: Mood normal.         Behavior: Behavior normal.         Thought Content:  Thought content normal.         Judgment: Judgment normal.       Jayda Santana, DO

## 2023-12-01 NOTE — ASSESSMENT & PLAN NOTE
POCT Covid negative  - supportive care with hydration, rest and flonase   - follow up if condition worsens or fails to improve

## 2024-01-29 PROBLEM — J06.9 VIRAL UPPER RESPIRATORY TRACT INFECTION: Status: RESOLVED | Noted: 2018-10-29 | Resolved: 2024-01-29

## 2025-01-16 ENCOUNTER — OFFICE VISIT (OUTPATIENT)
Dept: FAMILY MEDICINE CLINIC | Facility: CLINIC | Age: 55
End: 2025-01-16

## 2025-01-16 VITALS
SYSTOLIC BLOOD PRESSURE: 160 MMHG | DIASTOLIC BLOOD PRESSURE: 77 MMHG | HEIGHT: 68 IN | WEIGHT: 238.6 LBS | BODY MASS INDEX: 36.16 KG/M2 | TEMPERATURE: 98 F | RESPIRATION RATE: 20 BRPM | OXYGEN SATURATION: 99 % | HEART RATE: 64 BPM

## 2025-01-16 DIAGNOSIS — F41.9 ANXIETY: ICD-10-CM

## 2025-01-16 DIAGNOSIS — F41.1 GENERALIZED ANXIETY DISORDER: ICD-10-CM

## 2025-01-16 DIAGNOSIS — M25.561 ACUTE PAIN OF RIGHT KNEE: Primary | ICD-10-CM

## 2025-01-16 PROCEDURE — G2211 COMPLEX E/M VISIT ADD ON: HCPCS | Performed by: FAMILY MEDICINE

## 2025-01-16 PROCEDURE — 99213 OFFICE O/P EST LOW 20 MIN: CPT | Performed by: FAMILY MEDICINE

## 2025-01-16 RX ORDER — ACETAMINOPHEN 500 MG
1000 TABLET ORAL 3 TIMES DAILY
Qty: 90 TABLET | Refills: 1 | Status: SHIPPED | OUTPATIENT
Start: 2025-01-16

## 2025-01-16 RX ORDER — LIDOCAINE 40 MG/G
CREAM TOPICAL AS NEEDED
Qty: 120 G | Refills: 2 | Status: SHIPPED | OUTPATIENT
Start: 2025-01-16

## 2025-01-16 RX ORDER — SERTRALINE HYDROCHLORIDE 25 MG/1
25 TABLET, FILM COATED ORAL DAILY
Qty: 30 TABLET | Refills: 1 | Status: SHIPPED | OUTPATIENT
Start: 2025-01-16

## 2025-01-16 NOTE — PROGRESS NOTES
Name: Dar Lim      : 1970      MRN: [id number]  Encounter Provider: Mikel Cedeño DO  Encounter Date: 2025   Encounter department: [facility] [place]  :  Assessment & Plan  Acute pain of right knee  Posterior-lateral knee pain worse with knee extension. Sharp pain that will occasionally radiate to thigh and leg. Has a sense that his knee is locking up. Negative Sharon, lachman, anterior + posterior drawer, valgus and varus testing.   Plan:    This is likely a Baker's cyst on the posterior aspect of his right knee.  He has not seen a doctor for some while so reasonable to get some updated labs.  I will order a limited ultrasound to confirm.  Also a good idea to obtain plain film imaging to rule out arthritic degenerative contributors.  Can do topical lidocaine and Tylenol 1000 mg 3 times daily for pain control and rotate ibuprofen through.  Orders:    US MSK limited; Future    XR knee 3 vw right non injury; Future    lidocaine (LMX) 4 % cream; Apply topically as needed for mild pain    acetaminophen (TYLENOL) 500 mg tablet; Take 2 tablets (1,000 mg total) by mouth 3 (three) times a day    Comprehensive metabolic panel; Future    CBC and differential; Future    Lipid panel; Future    Generalized anxiety disorder  CODY-7 = 14. Anxiety started after many of his friends passed away. He did not have any anxiety attacks over the last 2 years. He has not been taking his anxiety medicines for 2 years, and he did not feel like Ddx adjustment disorder          His CODY-7 score is 20.  He has been on Zoloft in the past 25 mg.  He has had some friends who have unfortunately passed recently and this is caused him more anxiety.  He is noting some anxious like tension in his chest.  We will check updated labs but this is likely noncardiac chest pain.  I will resume him on his 25 mg of Zoloft and follow-up with him in a month.  I have also sent referrals to behavioral health  talk therapy in the network and an external referral to psych for talk therapy.  He is interested in both.      Orders:    Comprehensive metabolic panel; Future    CBC and differential; Future    Lipid panel; Future           History of Present Illness     Leg Pain   Pertinent negatives include no numbness.     1 month of R knee pain on the posterior-lateral side. Has some locking up occasionally as well as an occasional popping sound. Stretching has not helped. Does not remember an inciting event for this to start. Occasionally the pain has radiated to his lateral thigh and leg. Usually stays in knee. Sharp. Knee extension brings out the pain, especially with driving. Ibuprofen has kind of helped. Icing didn't help. Knee not swollen.     Pressure over sternum. Feels the pain randomly. Reminds him of his anxiety attacks he has had in the past. No SOB. No swelling in legs. No syncope, lightheadedness.    Zoloft and hydroxyzine. Did not do talk therapy. Trouble sleeping. Enjoys his job as a  as he is not around people.   Review of Systems   Constitutional:  Negative for activity change, fatigue, fever and unexpected weight change.   HENT:  Negative for congestion, rhinorrhea and sore throat.    Eyes:  Negative for visual disturbance.   Respiratory:  Negative for cough, chest tightness, shortness of breath and wheezing.    Cardiovascular:  Negative for chest pain, palpitations and leg swelling.   Gastrointestinal:  Negative for abdominal pain, constipation, diarrhea, nausea and vomiting.   Endocrine: Negative for polydipsia, polyphagia and polyuria.   Genitourinary:  Negative for decreased urine volume, difficulty urinating and urgency.   Musculoskeletal:  Positive for joint swelling. Negative for arthralgias, back pain and gait problem.   Skin:  Negative for rash.   Neurological:  Negative for dizziness, seizures, weakness, light-headedness, numbness and headaches.   Psychiatric/Behavioral:  Negative for  "behavioral problems and confusion.        Objective   /77   Pulse 64   Temp 98 °F (36.7 °C) (Temporal)   Resp 20   Ht 5' 8\" (1.727 m)   Wt 108 kg (238 lb 9.6 oz)   SpO2 99%   BMI 36.28 kg/m²      Physical Exam  Constitutional:       General: He is not in acute distress.     Appearance: Normal appearance. He is normal weight. He is not ill-appearing or toxic-appearing.   HENT:      Head: Normocephalic and atraumatic.      Right Ear: External ear normal.      Left Ear: External ear normal.      Nose: Nose normal.      Mouth/Throat:      Pharynx: Oropharynx is clear.   Eyes:      Pupils: Pupils are equal, round, and reactive to light.   Cardiovascular:      Rate and Rhythm: Normal rate and regular rhythm.      Pulses: Normal pulses.      Heart sounds: Normal heart sounds. No murmur heard.     No friction rub. No gallop.   Pulmonary:      Effort: Pulmonary effort is normal. No respiratory distress.      Breath sounds: Normal breath sounds. No wheezing.   Abdominal:      General: Abdomen is flat.      Palpations: Abdomen is soft.      Tenderness: There is no abdominal tenderness.   Musculoskeletal:      Cervical back: Normal range of motion and neck supple.      Comments: Negative Sharon test on R leg. Negative lachman. Normal ROM. Non-tender.  There is a collection on the posterior right knee consistent with Baker's cyst.   Skin:     General: Skin is warm and dry.      Capillary Refill: Capillary refill takes less than 2 seconds.   Neurological:      General: No focal deficit present.      Mental Status: He is alert and oriented to person, place, and time. Mental status is at baseline.   Psychiatric:         Mood and Affect: Mood normal.         Behavior: Behavior normal.         Thought Content: Thought content normal.         Judgment: Judgment normal.       Mikel Cedeño DO  PGY-2 Family Medicine - [place]   01/16/25, 4:25 PM    "

## 2025-01-23 ENCOUNTER — HOSPITAL ENCOUNTER (OUTPATIENT)
Dept: ULTRASOUND IMAGING | Facility: HOSPITAL | Age: 55
Discharge: HOME | End: 2025-01-23
Payer: MEDICARE

## 2025-01-23 DIAGNOSIS — M25.561 ACUTE PAIN OF RIGHT KNEE: ICD-10-CM

## 2025-01-23 PROCEDURE — 76882 US LMTD JT/FCL EVL NVASC XTR: CPT

## 2025-01-27 ENCOUNTER — RESULTS FOLLOW-UP (OUTPATIENT)
Dept: OTHER | Facility: HOSPITAL | Age: 55
End: 2025-01-27

## 2025-01-27 ENCOUNTER — HOSPITAL ENCOUNTER (OUTPATIENT)
Dept: RADIOLOGY | Facility: HOSPITAL | Age: 55
Discharge: HOME | End: 2025-01-27
Payer: MEDICARE

## 2025-01-27 DIAGNOSIS — M25.561 ACUTE PAIN OF RIGHT KNEE: ICD-10-CM

## 2025-01-27 PROCEDURE — 73562 X-RAY EXAM OF KNEE 3: CPT

## 2025-01-30 ENCOUNTER — TELEPHONE (OUTPATIENT)
Dept: FAMILY MEDICINE CLINIC | Facility: CLINIC | Age: 55
End: 2025-01-30

## 2025-02-10 ENCOUNTER — TELEPHONE (OUTPATIENT)
Age: 55
End: 2025-02-10

## 2025-02-10 NOTE — TELEPHONE ENCOUNTER
Contacted patient in regards to Routine Referral in attempts to verify patient's needs of services and add patient to proper wait list. spoke with patient whom stated he is interested in talk therapy. Pt has been added to proper wait list for TT.

## 2025-02-17 ENCOUNTER — RA CDI HCC (OUTPATIENT)
Dept: OTHER | Facility: HOSPITAL | Age: 55
End: 2025-02-17

## 2025-02-25 ENCOUNTER — OFFICE VISIT (OUTPATIENT)
Dept: FAMILY MEDICINE CLINIC | Facility: CLINIC | Age: 55
End: 2025-02-25

## 2025-02-25 VITALS
OXYGEN SATURATION: 99 % | HEART RATE: 70 BPM | BODY MASS INDEX: 35.31 KG/M2 | TEMPERATURE: 99.1 F | HEIGHT: 68 IN | WEIGHT: 233 LBS | DIASTOLIC BLOOD PRESSURE: 77 MMHG | SYSTOLIC BLOOD PRESSURE: 145 MMHG | RESPIRATION RATE: 20 BRPM

## 2025-02-25 DIAGNOSIS — F41.9 ANXIETY: Primary | ICD-10-CM

## 2025-02-25 DIAGNOSIS — M25.561 ACUTE PAIN OF RIGHT KNEE: ICD-10-CM

## 2025-02-25 PROCEDURE — G2211 COMPLEX E/M VISIT ADD ON: HCPCS | Performed by: FAMILY MEDICINE

## 2025-02-25 PROCEDURE — 99213 OFFICE O/P EST LOW 20 MIN: CPT | Performed by: FAMILY MEDICINE

## 2025-02-25 NOTE — PROGRESS NOTES
Name: Dar Lim      : 1970      MRN: [id number]  Encounter Provider: Mikel Cedeño DO  Encounter Date: 2025   Encounter department: [facility] [place]  :  Assessment & Plan  Anxiety  PHQ-2/9 Depression Screening    Little interest or pleasure in doing things: 2 - more than half the days  Feeling down, depressed, or hopeless: 2 - more than half the days  Trouble falling or staying asleep, or sleeping too much: 1 - several days  Feeling tired or having little energy: 2 - more than half the days  Poor appetite or overeatin - not at all  Feeling bad about yourself - or that you are a failure or have let yourself or your family down: 1 - several days  Trouble concentrating on things, such as reading the newspaper or watching television: 2 - more than half the days  Moving or speaking so slowly that other people could have noticed. Or the opposite - being so fidgety or restless that you have been moving around a lot more than usual: 2 - more than half the days  Thoughts that you would be better off dead, or of hurting yourself in some way: 1 - several days  PHQ-2 Score: 4  PHQ-2 Interpretation: POSITIVE depression screen  PHQ-9 Score: 13  PHQ-9 Interpretation: Moderate depression       CODY-7 Flowsheet Screening      Flowsheet Row Most Recent Value   Over the last two weeks, how often have you been bothered by the following problems?     Feeling nervous, anxious, or on edge 2   Not being able to stop or control worrying 2   Worrying too much about different things 1   Trouble relaxing  1   Being so restless that it's hard to sit still 1   Becoming easily annoyed or irritable  2   Feeling afraid as if something awful might happen 2   CODY Score  11          Doing better. CODY 7 has decreased. Not quite there though. Agreeable to intensify zoloft to 50mg daily.     Follow up 6 weeks   Orders:    sertraline (Zoloft) 50 mg tablet; Take 1 tablet (50 mg total) by mouth  "daily    Acute pain of right knee  Doing better here as well.  He has had a decrease in his pain and swelling.  His ultrasound was negative for Baker's cyst.  His pain is well managed with Tylenol.  I offered PT but he would like to hold off and we will revisit in 6 weeks to see how he is doing.  But overall he states his knee is getting a little better.              History of Present Illness   54-year-old male here for follow-up regarding right knee pain review of imaging findings as well as anxiety depression follow-up.  He states that Zoloft has helped but he is not quite there in terms of anxiety relief.  He says his knee has been feeling better as well.  Reviewed ultrasound results with him.  No other concerns.      Review of Systems   Constitutional:  Negative for activity change, fatigue, fever and unexpected weight change.   HENT:  Negative for congestion, rhinorrhea and sore throat.    Eyes:  Negative for visual disturbance.   Respiratory:  Negative for cough, chest tightness, shortness of breath and wheezing.    Cardiovascular:  Negative for chest pain, palpitations and leg swelling.   Gastrointestinal:  Negative for abdominal pain, constipation, diarrhea, nausea and vomiting.   Endocrine: Negative for polydipsia, polyphagia and polyuria.   Genitourinary:  Negative for decreased urine volume, difficulty urinating and urgency.   Musculoskeletal:  Negative for arthralgias, back pain, gait problem and joint swelling.        Right knee pain    Skin:  Negative for rash.   Neurological:  Negative for dizziness, seizures, weakness, light-headedness, numbness and headaches.   Psychiatric/Behavioral:  Negative for behavioral problems and confusion.        Objective   /77   Pulse 70   Temp 99.1 °F (37.3 °C) (Temporal)   Resp 20   Ht 5' 8\" (1.727 m)   Wt 106 kg (233 lb)   SpO2 99%   BMI 35.43 kg/m²      Physical Exam  Constitutional:       General: He is not in acute distress.     Appearance: Normal " appearance.   HENT:      Head: Normocephalic and atraumatic.      Right Ear: External ear normal.      Left Ear: External ear normal.      Mouth/Throat:      Pharynx: Oropharynx is clear.   Eyes:      Conjunctiva/sclera: Conjunctivae normal.   Cardiovascular:      Rate and Rhythm: Normal rate and regular rhythm.      Pulses: Normal pulses.      Heart sounds: Normal heart sounds. No murmur heard.     No friction rub. No gallop.   Pulmonary:      Effort: Pulmonary effort is normal. No respiratory distress.      Breath sounds: Normal breath sounds. No wheezing.   Abdominal:      Palpations: Abdomen is soft.   Musculoskeletal:         General: Normal range of motion.   Skin:     General: Skin is warm and dry.      Capillary Refill: Capillary refill takes less than 2 seconds.   Neurological:      General: No focal deficit present.      Mental Status: He is alert and oriented to person, place, and time. Mental status is at baseline.   Psychiatric:         Mood and Affect: Mood normal.         Behavior: Behavior normal.         Thought Content: Thought content normal.         Judgment: Judgment normal.       Mikel Cedeño DO  PGY-2 [facility]   02/25/25, 4:18 PM

## 2025-02-25 NOTE — ASSESSMENT & PLAN NOTE
PHQ-2/9 Depression Screening    Little interest or pleasure in doing things: 2 - more than half the days  Feeling down, depressed, or hopeless: 2 - more than half the days  Trouble falling or staying asleep, or sleeping too much: 1 - several days  Feeling tired or having little energy: 2 - more than half the days  Poor appetite or overeatin - not at all  Feeling bad about yourself - or that you are a failure or have let yourself or your family down: 1 - several days  Trouble concentrating on things, such as reading the newspaper or watching television: 2 - more than half the days  Moving or speaking so slowly that other people could have noticed. Or the opposite - being so fidgety or restless that you have been moving around a lot more than usual: 2 - more than half the days  Thoughts that you would be better off dead, or of hurting yourself in some way: 1 - several days  PHQ-2 Score: 4  PHQ-2 Interpretation: POSITIVE depression screen  PHQ-9 Score: 13  PHQ-9 Interpretation: Moderate depression       CODY-7 Flowsheet Screening      Flowsheet Row Most Recent Value   Over the last two weeks, how often have you been bothered by the following problems?     Feeling nervous, anxious, or on edge 2   Not being able to stop or control worrying 2   Worrying too much about different things 1   Trouble relaxing  1   Being so restless that it's hard to sit still 1   Becoming easily annoyed or irritable  2   Feeling afraid as if something awful might happen 2   CODY Score  11          Doing better. CODY 7 has decreased. Not quite there though. Agreeable to intensify zoloft to 50mg daily.     Follow up 6 weeks   Orders:    sertraline (Zoloft) 50 mg tablet; Take 1 tablet (50 mg total) by mouth daily

## 2025-05-02 ENCOUNTER — SOCIAL WORK (OUTPATIENT)
Dept: BEHAVIORAL/MENTAL HEALTH CLINIC | Facility: CLINIC | Age: 55
End: 2025-05-02

## 2025-05-02 DIAGNOSIS — F40.10 SOCIAL ANXIETY DISORDER: ICD-10-CM

## 2025-05-02 DIAGNOSIS — F41.9 ANXIETY: Primary | ICD-10-CM

## 2025-05-02 DIAGNOSIS — F22 PARANOIA (HCC): ICD-10-CM

## 2025-05-02 NOTE — BH TREATMENT PLAN
"Behavioral Health Clinician ([facility]) Treatment Plan      Name:  Dar Lim   :  1970      Initial [facility] Assessment Date: 25   Target Date:   25  Expiration Date:   10/10/25    Assessment:  Patient Active Problem List   Diagnosis    Exposure to SARS-associated coronavirus    COVID-19 virus infection    Acute respiratory distress    Vitamin D deficiency    Elevated liver enzymes    Hypoxia    Wheezing    Alcohol use    Flushing    Elevated blood pressure reading    Essential (primary) hypertension     Encounter for screening colonoscopy    Anxiety    Anxiety disorder    Social anxiety disorder    Paranoia (HCC)         Treatment Plan         Identified Areas of Need:  Need: communication skills, learning how to be with others or interact with others, leisure/hobbies/activities  As evidenced by: avoiding social interactions    Due to:  anxiety/social anxiety, paranoia     Strengths (client's own words):  \"I am kind, light-hearted, and I provide for my family.\"       Supports:  Tommy (cousin), Lima (wife), friends   Risks:   Alcohol, law enforcement (makes me nervous),      Initial Plan Date: 25      Goals       1. Goal:  I will establish with an OP psychiatrist and talk therapist.      - Stage of Change: Preparation      - Target Date:  25     - Completion Date: TBD        2. Goal: I will attend my medical appointments.       - Stage of Change: Maintenance       - Target Date:  25     - Completion Date: TBD        3. Goal: I will take my medications as prescribed and address concerns with my treatment team.       - Stage of Change: Maintenance      - Target Date:  25     - Completion Date: TBD        Therapeutic Modalities: Engagement Strategies and Motivational Interviewing (MI)     Outpatient Psychiatric Care    - Psychiatrist: no    - Taking Medications: Yes, however reports that does not feel ZOLOFT  is helping. Dar is open to establishing with psychiatry for medication " management.      Discharge Goals    I will be ready for discharge when:  I am better able to manage my anxiety.     Legal Custody/Guardianship (If applicable)    - Any changes? No     Summary  Diagnosis and plan explained to Dar Lim.    Dar Lim acknowledges understanding.    Dar Lim agrees with the plan.    Copy of the plan: Accepted    Dar Lim aware to contact office if symptoms recur or worsen. Dar Lim verbalized understanding of [phone], [phone], and use of local emergency department if needed.
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
## 2025-05-02 NOTE — BH CRISIS PLAN
Client Name: Dar Lim       Client YOB: 1970    [facility] Safety Plan      Creation Date: 5/2/25 Update Date: 5/2/25   Created By: Vargas Wang LCSW Last Updated By: Vargas Wang LCSW      Step 1: Warning Signs:   Warning Signs   mind races   heart races   I get fidgety            Step 2: Internal Coping Strategies:   Internal Coping Strategies   pray   watch funny TikToks/videos   say positive self-talk/affirmations            Step 3: People and social settings that provide distraction:   Name Contact Information   Lima (wife) in cellphone   Tommy (cousin) in cellphone            Step 4: People whom I can ask for help during a crisis:      Name Contact Information    Lima (wife) in cellphone    Tommy (cousin) in cellphone      Step 5: Professionals or agencies I can contact during a crisis:      Clinican/Agency Name Phone Emergency Contact    [facility] [phone]         Crisis Phone Numbers:   Suicide Prevention Lifeline: Call or Text  988 Crisis Text Line: Text HOME to 197-701   Please note: Some [facility] do not have a separate number for Child/Adolescent specific crisis. If your county is not listed under Child/Adolescent, please call the adult number for your county      Adult Crisis Numbers: Child/Adolescent Crisis Numbers   [facility]: [phone] [facility]: [phone]   [facility]: [phone] [facility]: [phone]   [facility]: [phone] [place]: [phone]   [facility]: [phone] [place]: [phone]   [place]: [phone]   [facility]: [phone]   [facility]: [phone]   [facility]: [phone] (daytime) [phone] (after hours, weekends, holidays)      Step 6: Making the environment safer (plan for lethal means safety):   Patient did not identify any lethal methods: Yes     Optional: What is most  important to me and worth living for?      Dharmesh Safety Plan. Sana Walker and Jeramy Castellanos. Used with permission of the authors.

## 2025-05-09 ENCOUNTER — SOCIAL WORK (OUTPATIENT)
Dept: BEHAVIORAL/MENTAL HEALTH CLINIC | Facility: CLINIC | Age: 55
End: 2025-05-09

## 2025-05-09 ENCOUNTER — OFFICE VISIT (OUTPATIENT)
Dept: FAMILY MEDICINE CLINIC | Facility: CLINIC | Age: 55
End: 2025-05-09

## 2025-05-09 VITALS
HEIGHT: 68 IN | TEMPERATURE: 98.7 F | OXYGEN SATURATION: 96 % | SYSTOLIC BLOOD PRESSURE: 141 MMHG | HEART RATE: 76 BPM | DIASTOLIC BLOOD PRESSURE: 73 MMHG | WEIGHT: 230 LBS | BODY MASS INDEX: 34.86 KG/M2

## 2025-05-09 DIAGNOSIS — F22 PARANOIA (HCC): ICD-10-CM

## 2025-05-09 DIAGNOSIS — F41.9 ANXIETY: ICD-10-CM

## 2025-05-09 DIAGNOSIS — R44.0 AUDITORY HALLUCINATIONS: ICD-10-CM

## 2025-05-09 DIAGNOSIS — F41.0 PANIC ANXIETY SYNDROME: Primary | ICD-10-CM

## 2025-05-09 DIAGNOSIS — F41.9 ANXIETY: Primary | ICD-10-CM

## 2025-05-09 DIAGNOSIS — F40.10 SOCIAL ANXIETY DISORDER: ICD-10-CM

## 2025-05-09 PROCEDURE — 99213 OFFICE O/P EST LOW 20 MIN: CPT | Performed by: FAMILY MEDICINE

## 2025-05-09 PROCEDURE — G2211 COMPLEX E/M VISIT ADD ON: HCPCS | Performed by: FAMILY MEDICINE

## 2025-05-09 PROCEDURE — 90832 PSYTX W PT 30 MINUTES: CPT

## 2025-05-09 RX ORDER — ARIPIPRAZOLE 2 MG/1
2 TABLET ORAL DAILY
Qty: 30 TABLET | Refills: 0 | Status: SHIPPED | OUTPATIENT
Start: 2025-05-09

## 2025-05-09 RX ORDER — PROPRANOLOL HYDROCHLORIDE 10 MG/1
10 TABLET ORAL DAILY PRN
Qty: 15 TABLET | Refills: 0 | Status: SHIPPED | OUTPATIENT
Start: 2025-05-09

## 2025-05-09 NOTE — PSYCH
Behavioral Health Clinician ([facility]) Note        Name: Dar Lim  : 1970   Date: 25    Location: [facility], [facility]  - [facility]  Encounter Type: Behavioral Health Clinician Intervention     Time:   Start Time: 908  Stop Time: 936  Total Visit Time: 28 minutes    Diagnosis:  Presenting Problem/Diagnosis: Dar   Current Diagnosis:   1. Anxiety        2. Social anxiety disorder        3. Paranoia (HCC)            Chief Complaint: Dar Lim presented for treatment due to complaints of Anxiety symptoms, Panic attacks, Difficulty with communications, and Difficulty with interpreting social cues     Assessment & Safety Planning:     Risk Assessment:    The following ratings are based on my evaluation/assessment of Dar Lim.   Risk of Harm to Self:    Demographic risk factors include (check all that apply): Male  Historical Risk Factors include (check all that apply): Alcohol or other substance use (list other) - alcohol  Based on the above information, the client presents the following risk of harm to self or others: *CHECK ONE*  LOW  [x]  MEDIUM   []  HIGH    []  The following interventions are recommended: no intervention changes     Substance Abuse Assessment (check all that apply): Dar reports history of alcoholism. Dar reports that he used to drink a 12-pack of beer, 3-4 nights a week. Dar reports that he does drink occasionally now (1-2 times a month, 12-pack each time). Dar denies any other current or historical substance abuse.     Planning & Goal Setting: Dar Lim was seen for Individual  Treatment Modality Utilized (check all that apply): Engagement Strategies, Motivational Interviewing (MI), and Solution-Focused Therapy    Was this a virtual/tele-health visit? No    Additional Comments  Dar presents today for planned follow-up. Dar reports that he still has issues with paranoia and  auditory hallucinations. Dar discussed continued issues with feeling like people are talking to him or about him when he is out in public. Dar is able to rationalize and state that he knows they're not talking about him, however he finds this to be an on-going issue that causes him stress.    Dar also reports that he continues to have issues related to communicating with his friends and family. Dar reports that he has always had issues communicating his thoughts and feelings with his wife specifically. Dar states that sometimes he does not feel heard and so he will get very angry and frustrated and will have to remove himself from a situation and talk himself down. [facility] provided education on different ways to communicate thoughts and feelings and provided space for Dar to discuss what the benefits of this might be. Dar disclosed that openly talking about his thoughts and feelings was not a common practice growing up and this was something he carried into adulthood. Dar was able to verbalize how talking about feelings, thoughts, and stressors may be able to help him manage his anger and also his paranoia/hallucinations.     Dar reports that he is looking forward to talking to PCP after visit with [facility] today. Dar reports that he is still interested in establishing with on-going OP MH care (both psychiatry and talk therapy). Dar aware that he has been added to the [facility] and that they will outreach with availability once there's an opening. Dar provided with list of other OP MH providers that he has agreed to call and attempt to obtain and appointment with.     No SI/HI/SIB/VH disclosed during this encounter. Plan for Dar to follow-up with this [facility] and with PCP in 3-4 weeks. Dar aware to contact this [facility] or  if needing to return earlier.

## 2025-05-09 NOTE — PROGRESS NOTES
Name: Dar Lim      : 1970      MRN: [id number]  Encounter Provider: Mikel Cedeño DO  Encounter Date: 2025   Encounter department: [facility]  :    A portion of this note was written by medical student Ezra Ramirez. I have reviewed their contribution to the note and I agree with their documentation.      54-year-old male here for follow-up in regards to his anxiety.  He has had slight improvement in his CODY scoring with Zoloft although largely notes it is only been minimally effective.  Has a family history of schizophrenia in his sister.  Seen by behavioral health clinician here in the office there are some concerns for possible mood disorders and/or auditory delusions/hallucinations.  Per patient and chart review he seems to have the feeling that people are talking about him when they in fact are not.  This especially happens in large crowds.  He gets very nervous and anxious when going out in public and has associated panic type symptomatology.    For his panic disorder in conjunction with his anxiety we will keep Zoloft dosing the same at 50 mg daily and add on as needed daily propranolol 10 mg to take on an as needed basis for his panic attacks, they seem to be driven by somatiform exacerbations of feeling his heart racing.    He should likely be established with psychiatry at some point for formal workup if he indeed does have a mood disorder especially with family history of schizophrenia in his sister.  He is very reasonable on exam and his thought process and judgment are within normal limits however he does endorse a history, when taken in context may fit auditory hallucinations/delusions however they are very minimal and hard to dissect out in the setting of his anxiety and panic disorder.    For this, we will add on Abilify 5 mg daily.  Whether this is pure anxiety with panic overlay or anxiety with an overlying mood disorder such as  schizoaffective versus other psychotic features he may have benefit from this antipsychotic medication either way.    Follow up in 4 weeks to ascertain efficacy of pharmacotherapy      Mikel Cedeño D.O.  [facility], PGY-2  05/09/25 11:19 AM      Assessment & Plan  Panic anxiety syndrome  Assess: Patient sometimes has episodes where he feels as thought his heart is beating pathologically fast and hard and it causes him to have an anxiety/panic attack at times.    Plan: Patient shows interest in starting propranolol for his racing heart symptoms. Patient is instructed to take the propranolol of 10 mg as needed when he starts to feel his heart beat out of control. Patient is advised to call back or go to ED should chest pain occur during such episodes.   Orders:    ARIPiprazole (ABILIFY) 2 mg tablet; Take 1 tablet (2 mg total) by mouth daily    propranolol (INDERAL) 10 mg tablet; Take 1 tablet (10 mg total) by mouth daily as needed (for panic attacks)    Auditory hallucinations  Assess: Patient explains that he often feels as if people are talking about him when he is in public settings and it causes him distress    Plan: Patient shows interest in starting at the lowest dose of aripiprazole to try and alleviate some of his worry and anxiety surrounding the feelings that people are talking about him always. Patient will return in a month to see how the abilify has effected his mood and anxiety abnormalities.  Orders:    ARIPiprazole (ABILIFY) 2 mg tablet; Take 1 tablet (2 mg total) by mouth daily    Anxiety  Assess: Patient last 2 gad7 scores where 11 and today 9. Patient is unsure if the zoloft has been helping his anxiety but at a previous visit explained that he had noticed a positive difference on his anxiety symptoms.   Plan: Patient is informed to continue taking the current 50 mg dose of zoloft while the abilify is added to his medication regimen in order to better assess the effects of the low dose  "abilify on his symptom management.    CODY-7 Flowsheet Screening      Flowsheet Row Most Recent Value   Over the last two weeks, how often have you been bothered by the following problems?     Feeling nervous, anxious, or on edge 1   Not being able to stop or control worrying 1   Worrying too much about different things 1   Trouble relaxing  2   Being so restless that it's hard to sit still 2   Becoming easily annoyed or irritable  1   Feeling afraid as if something awful might happen 1   How difficult have these problems made it for you to do your work, take care of things at home, or get along with other people?  Somewhat difficult   CODY Score  9          CODY-7 Flowsheet Screening      Flowsheet Row Most Recent Value   Over the last two weeks, how often have you been bothered by the following problems?     Feeling nervous, anxious, or on edge 1   Not being able to stop or control worrying 1   Worrying too much about different things 1   Trouble relaxing  2   Being so restless that it's hard to sit still 2   Becoming easily annoyed or irritable  1   Feeling afraid as if something awful might happen 1   How difficult have these problems made it for you to do your work, take care of things at home, or get along with other people?  Somewhat difficult   CODY Score  9              Orders:    sertraline (Zoloft) 50 mg tablet; Take 1 tablet (50 mg total) by mouth daily           History of Present Illness   Dar came to the office today to see the clinical therapist as well as a 6 week follow up for anxiety check. The patient recently had his dose of zoloft increased to 50 mg from 25 mg. The patient states that the increase in dosage has not changed any of his symptoms. Today a GAD7 check showed a score of 10 only a decrease of 1 point since last visit with a score of 11. The patient explains that his anxiety is worst in social environments especially in overcrowded areas and that he \"feels as if people around me are " "talking about me and I have to talk myself down by thinking that they may be talking about other things.\" The patient states that he doesn't think the zoloft is curbing his symptoms and that his sleep has still been poor; waking up multiple times per night and having trouble falling asleep. Patient shows interest in starting the lowest dose of abilify to see if it will improve his symptoms at all. In addition the patient complains of panic attacks where he feels overly anxious and feels that his heart is beating too strongly and too quickly and it worries him. Patient showed interest in starting propranolol to take as needed for these panic events.       Review of Systems   Constitutional:  Negative for chills and fever.   Respiratory:  Negative for shortness of breath.    Cardiovascular:  Negative for chest pain and leg swelling.       Objective   /73 (BP Location: Right arm, Patient Position: Sitting, Cuff Size: Standard)   Pulse 76   Temp 98.7 °F (37.1 °C) (Temporal)   Ht 5' 8\" (1.727 m)   Wt 104 kg (230 lb)   SpO2 96%   BMI 34.97 kg/m²      Physical Exam  Cardiovascular:      Rate and Rhythm: Normal rate and regular rhythm.      Pulses: Normal pulses.      Heart sounds: Normal heart sounds. No murmur heard.     No friction rub. No gallop.   Pulmonary:      Effort: Pulmonary effort is normal. No respiratory distress.      Breath sounds: Normal breath sounds. No stridor. No wheezing, rhonchi or rales.   Chest:      Chest wall: No tenderness.   Neurological:      Mental Status: He is oriented to person, place, and time.       \Mikel Cedeño DO  PGY-2 [facility]   05/09/25, 11:19 AM\  "

## 2025-05-09 NOTE — ASSESSMENT & PLAN NOTE
Assess: Patient last 2 gad7 scores where 11 and today 9. Patient is unsure if the zoloft has been helping his anxiety but at a previous visit explained that he had noticed a positive difference on his anxiety symptoms.   Plan: Patient is informed to continue taking the current 50 mg dose of zoloft while the abilify is added to his medication regimen in order to better assess the effects of the low dose abilify on his symptom management.    CODY-7 Flowsheet Screening      Flowsheet Row Most Recent Value   Over the last two weeks, how often have you been bothered by the following problems?     Feeling nervous, anxious, or on edge 1   Not being able to stop or control worrying 1   Worrying too much about different things 1   Trouble relaxing  2   Being so restless that it's hard to sit still 2   Becoming easily annoyed or irritable  1   Feeling afraid as if something awful might happen 1   How difficult have these problems made it for you to do your work, take care of things at home, or get along with other people?  Somewhat difficult   CODY Score  9          CODY-7 Flowsheet Screening      Flowsheet Row Most Recent Value   Over the last two weeks, how often have you been bothered by the following problems?     Feeling nervous, anxious, or on edge 1   Not being able to stop or control worrying 1   Worrying too much about different things 1   Trouble relaxing  2   Being so restless that it's hard to sit still 2   Becoming easily annoyed or irritable  1   Feeling afraid as if something awful might happen 1   How difficult have these problems made it for you to do your work, take care of things at home, or get along with other people?  Somewhat difficult   CODY Score  9              Orders:    sertraline (Zoloft) 50 mg tablet; Take 1 tablet (50 mg total) by mouth daily

## 2025-07-08 ENCOUNTER — RA CDI HCC (OUTPATIENT)
Dept: OTHER | Facility: HOSPITAL | Age: 55
End: 2025-07-08

## 2025-07-11 ENCOUNTER — TELEPHONE (OUTPATIENT)
Dept: FAMILY MEDICINE CLINIC | Facility: CLINIC | Age: 55
End: 2025-07-11

## 2025-07-14 ENCOUNTER — TELEPHONE (OUTPATIENT)
Dept: FAMILY MEDICINE CLINIC | Facility: CLINIC | Age: 55
End: 2025-07-14

## 2025-07-16 ENCOUNTER — SOCIAL WORK (OUTPATIENT)
Dept: BEHAVIORAL/MENTAL HEALTH CLINIC | Facility: CLINIC | Age: 55
End: 2025-07-16

## 2025-07-16 DIAGNOSIS — F40.10 SOCIAL ANXIETY DISORDER: ICD-10-CM

## 2025-07-16 DIAGNOSIS — R44.0 AUDITORY HALLUCINATIONS: ICD-10-CM

## 2025-07-16 DIAGNOSIS — F22 PARANOIA (HCC): ICD-10-CM

## 2025-07-16 DIAGNOSIS — F41.0 PANIC ANXIETY SYNDROME: ICD-10-CM

## 2025-07-16 DIAGNOSIS — F41.9 ANXIETY: Primary | ICD-10-CM

## 2025-07-16 DIAGNOSIS — F41.9 ANXIETY: ICD-10-CM

## 2025-07-16 PROCEDURE — 90832 PSYTX W PT 30 MINUTES: CPT

## 2025-07-16 RX ORDER — PROPRANOLOL HYDROCHLORIDE 10 MG/1
10 TABLET ORAL DAILY PRN
Qty: 30 TABLET | Refills: 0 | Status: SHIPPED | OUTPATIENT
Start: 2025-07-16

## 2025-07-16 RX ORDER — ARIPIPRAZOLE 2 MG/1
2 TABLET ORAL DAILY
Qty: 90 TABLET | Refills: 1 | Status: SHIPPED | OUTPATIENT
Start: 2025-07-16 | End: 2025-07-28 | Stop reason: SDUPTHER

## 2025-07-16 NOTE — PSYCH
Doc - need refill on Rx  Oralia - need to follow-up regarding estalbishing OP Care.    Issues  No A/C at work  Worsening anxiety / paranoia - watching people drive  Easily distractable  Worries about ICE  Superman movie - no anxiety, however no people

## 2025-07-16 NOTE — BH TREATMENT PLAN
"Behavioral Health Clinician ([facility]) Treatment Plan        Name:  Dar Lim   :  1970      Initial [facility] Assessment Date: 25   Target Date:   25, 25  Expiration Date:   10/10/25     Assessment:  Problem List       Patient Active Problem List   Diagnosis    Exposure to SARS-associated coronavirus    COVID-19 virus infection    Acute respiratory distress    Vitamin D deficiency    Elevated liver enzymes    Hypoxia    Wheezing    Alcohol use    Flushing    Elevated blood pressure reading    Essential (primary) hypertension     Encounter for screening colonoscopy    Anxiety    Anxiety disorder    Social anxiety disorder    Paranoia (HCC)            Treatment Plan          Identified Areas of Need:  Need: communication skills, learning how to be with others or interact with others, leisure/hobbies/activities  As evidenced by: avoiding social interactions    Due to:  anxiety/social anxiety, paranoia     Strengths (client's own words):  \"I am kind, light-hearted, and I provide for my family.\"       Supports:  Tommy (cousin), Lima (wife), friends   Risks:   Alcohol, law enforcement (makes me nervous),      Initial Plan Date: 25      Goals       1. Goal:  I will establish with an OP psychiatrist and talk therapist.      - Stage of Change: Preparation      - Target Date:  25, 25     - Completion Date: TBD        2. Goal: I will attend my medical appointments.       - Stage of Change: Maintenance       - Target Date:  25, 25     - Completion Date: TBD        3. Goal: I will take my medications as prescribed and address concerns with my treatment team.       - Stage of Change: Maintenance      - Target Date:  25, 25     - Completion Date: TBD        Therapeutic Modalities: Engagement Strategies and Motivational Interviewing (MI)     Outpatient Psychiatric Care    - Psychiatrist: No   - Taking Medications: Yes, however reports that does not feel [facility]  is helping. " Dar is open to establishing with psychiatry for medication management.      Discharge Goals    I will be ready for discharge when:  I am better able to manage my anxiety and establish with OP talk therapy and a psychiatrist.     Legal Custody/Guardianship (If applicable)    - Any changes? No     Summary  Diagnosis and plan explained to Dar Lim.    Dar Lim acknowledges understanding.    Dar Lim agrees with the plan.    Copy of the plan: Accepted    Dar Lim aware to contact office if symptoms recur or worsen. Dar Lim verbalized understanding of 911, [phone], and use of local emergency department if needed.

## 2025-07-16 NOTE — PSYCH
Behavioral Health Clinician ([facility]) Note        Name: Dar Lim  : 1970   Date: 25    Location: [facility], [facility]  - [facility]  Encounter Type: Behavioral Health Clinician Intervention     Time:   Start Time: 1032  Stop Time: 110  Total Visit Time: 29 minutes    Diagnosis:  Presenting Problem/Diagnosis: Dar presents today for planned follow-up.   Current Diagnosis:   1. Anxiety        2. Social anxiety disorder        3. Paranoia (HCC)          Chief Complaint: Dar Lim presented for treatment due to complaints of Anxiety symptoms, Panic attacks, Difficulty with communications, and Difficulty with interpreting social cues     Assessment & Safety Planning:     Risk Assessment:  The following ratings are based on my evaluation/assessment of Dar Lim.     Risk of Harm to Self:    Demographic risk factors include (check all that apply): Male  Historical Risk Factors include (check all that apply): Alcohol or other substance use (list other) - alcohol  Based on the above information, the client presents the following risk of harm to self or others: *CHECK ONE*  LOW  [x]  MEDIUM   []  HIGH    []  The following interventions are recommended: no intervention changes     Substance Abuse Assessment (check all that apply): Dar reports history of alcoholism. Dar reports that he used to drink a 12-pack of beer, 3-4 nights a week. Dar reports that he does drink occasionally now (1-2 times a month, 12-pack each time). Dar denies any other current or historical substance abuse.      Planning & Goal Setting: Dar Lim was seen for Individual  Treatment Modality Utilized (check all that apply): Engagement Strategies, Motivational Interviewing (MI), and Solution-Focused Therapy     Was this a virtual/tele-health visit? No     Additional Comments  Dar presents today for planned follow-up. Dar reports that  he still has issues with paranoia and auditory hallucinations. Dar discussed continued issues with feeling like people are talking to him or about him when he is out in public. Dar is able to rationalize and state that he knows they're not talking about him, however he finds this to be an on-going issue that causes him stress.    Dar reports that he does have increased anxiety as it relates to recent political/legislative affairs regarding immigrations and ICE. Dar states that he was born here in the [place], however identifies as / and reports worries related to being detained or deported. [facility] and Dar discussed steps that could be taken including having a safety plan/check-in plan with his family and making copies of all documentation (birth certificate, social security card, passport, etc) that would be available to trusted friends or family members should the need arise. Dar reports that he did not think of having any back-up plan in place in the even that something happened.    Dar reports that he finds himself being more easily distracted. Dar reports that he went to go see the new Superman movie and that it was overall a positive experience. Dar reports that he did not deal with anxiety or AH. Dar reports that he did not feel people were talking about him during the movie. Dar reports that he did find it hard to concentrate and focus on the movie.    [facility] approached topic of medications. Dar reports that he has not been taking medications as prescribed. Dar reports that he felt the Rx were helpful however did not take as Rx'd and has run out of his medications. Epic reviewed to confirm same, Rx have not been sent since 5/9/25. Epic message sent to PCP and clinical team requesting update sent to pharmacy as Dar reports that he would like to remain on these medications.    Dar reports that he has not made any progress on establishing with a psychiatrist and talk therapist  and is agreeable to working with OP SW CM regarding this. Referral opened in Epic for OP SW CM to follow-up with Dar to help connect to long-term MH care.     Treatment plan was previously due for update however Dar NC/NS to 2 appointments and was unable to be updated previously. Treatment plan updated today, siganture obtained. Plan for follow-up in 2-3 weeks. Dar aware to schedule follow-up with PCP regarding medications as well - request entered into wrap-up note for clerical team to assist as check out.    No SI/HI/SIB/VH disclosed during this encounter. Dar denying any AH during session. Plan for follow-up with [facility] in 2-3 weeks.

## 2025-07-17 ENCOUNTER — PATIENT OUTREACH (OUTPATIENT)
Dept: FAMILY MEDICINE CLINIC | Facility: CLINIC | Age: 55
End: 2025-07-17

## 2025-07-17 NOTE — PROGRESS NOTES
MELANIE SUMMERS connected with PT about outpatient therapy resources.  PT would like resources mailed to him. MELANIE SUMMERS confirmed PT address for proper mailing. At this time PT has no other concerns. MELANIE SUMMERS stated that PT can call the office back with any concerns or needs.

## 2025-07-28 ENCOUNTER — OFFICE VISIT (OUTPATIENT)
Dept: FAMILY MEDICINE CLINIC | Facility: CLINIC | Age: 55
End: 2025-07-28

## 2025-07-28 VITALS
HEART RATE: 71 BPM | WEIGHT: 230.2 LBS | SYSTOLIC BLOOD PRESSURE: 143 MMHG | DIASTOLIC BLOOD PRESSURE: 81 MMHG | RESPIRATION RATE: 18 BRPM | OXYGEN SATURATION: 96 % | TEMPERATURE: 98 F | BODY MASS INDEX: 35 KG/M2

## 2025-07-28 DIAGNOSIS — F41.9 ANXIETY: ICD-10-CM

## 2025-07-28 DIAGNOSIS — R44.0 AUDITORY HALLUCINATIONS: ICD-10-CM

## 2025-07-28 DIAGNOSIS — F41.0 PANIC ANXIETY SYNDROME: ICD-10-CM

## 2025-07-28 PROCEDURE — G2211 COMPLEX E/M VISIT ADD ON: HCPCS | Performed by: FAMILY MEDICINE

## 2025-07-28 PROCEDURE — 99213 OFFICE O/P EST LOW 20 MIN: CPT | Performed by: FAMILY MEDICINE

## 2025-07-28 RX ORDER — ARIPIPRAZOLE 2 MG/1
2 TABLET ORAL DAILY
Qty: 90 TABLET | Refills: 2 | Status: SHIPPED | OUTPATIENT
Start: 2025-07-28